# Patient Record
Sex: MALE | Race: WHITE | NOT HISPANIC OR LATINO | Employment: OTHER | URBAN - NONMETROPOLITAN AREA
[De-identification: names, ages, dates, MRNs, and addresses within clinical notes are randomized per-mention and may not be internally consistent; named-entity substitution may affect disease eponyms.]

---

## 2017-06-15 ENCOUNTER — OFFICE VISIT (OUTPATIENT)
Dept: SURGERY | Facility: CLINIC | Age: 57
End: 2017-06-15

## 2017-06-15 VITALS
WEIGHT: 134 LBS | HEIGHT: 66 IN | DIASTOLIC BLOOD PRESSURE: 90 MMHG | BODY MASS INDEX: 21.53 KG/M2 | TEMPERATURE: 99.8 F | HEART RATE: 87 BPM | OXYGEN SATURATION: 96 % | SYSTOLIC BLOOD PRESSURE: 140 MMHG

## 2017-06-15 DIAGNOSIS — K40.90 UNILATERAL INGUINAL HERNIA WITHOUT OBSTRUCTION OR GANGRENE, RECURRENCE NOT SPECIFIED: Primary | ICD-10-CM

## 2017-06-15 DIAGNOSIS — I25.10 CORONARY ARTERY DISEASE INVOLVING NATIVE HEART WITHOUT ANGINA PECTORIS, UNSPECIFIED VESSEL OR LESION TYPE: ICD-10-CM

## 2017-06-15 PROCEDURE — 99214 OFFICE O/P EST MOD 30 MIN: CPT | Performed by: SURGERY

## 2017-06-15 RX ORDER — QUETIAPINE FUMARATE 25 MG/1
250 TABLET, FILM COATED ORAL NIGHTLY
COMMUNITY
End: 2022-09-22 | Stop reason: DRUGHIGH

## 2017-06-15 RX ORDER — IBUPROFEN 800 MG/1
TABLET ORAL
Refills: 0 | COMMUNITY
Start: 2017-06-02 | End: 2018-02-05

## 2017-06-15 RX ORDER — HYDROXYZINE HYDROCHLORIDE 25 MG/1
TABLET, FILM COATED ORAL
COMMUNITY
Start: 2017-04-03 | End: 2018-02-05

## 2017-06-15 RX ORDER — ATORVASTATIN CALCIUM 40 MG/1
TABLET, FILM COATED ORAL
Refills: 0 | COMMUNITY
Start: 2017-05-22 | End: 2018-02-05 | Stop reason: DRUGHIGH

## 2017-06-15 NOTE — PROGRESS NOTES
Patient: Gregor Fish Jr.    YOB: 1960    Date: 06/15/2017    Primary Care Provider: TAMMI Samson    Reason for Consultation: Inguinal pain / mass    Chief complaint:   Chief Complaint   Patient presents with   • Pain     left groin bulge/pain       Subjective .     History of present illness:  I saw the patient in the office today for a consultation for a possible hernia. Patient states he noticed a bulge @ left groin a few months ago.  He complains of growth,diarrhea and pain, worse with lifting. He had a right inguinal hernia repair done in the past.  Patient had a colonoscopy a year ago by Dr. Shepard. He had a heart attack 10 years ago. Patient is not on blood thinners. He does still smoke.    Review of Systems   Constitutional: Negative for chills, fever and unexpected weight change.   HENT: Negative for trouble swallowing and voice change.    Eyes: Negative for visual disturbance.   Respiratory: Positive for cough and shortness of breath. Negative for apnea, chest tightness and wheezing.    Cardiovascular: Negative for chest pain, palpitations and leg swelling.   Gastrointestinal: Positive for abdominal pain (LLQ). Negative for abdominal distention, anal bleeding, blood in stool, constipation, diarrhea, nausea, rectal pain and vomiting.   Endocrine: Negative for cold intolerance and heat intolerance.   Genitourinary: Negative for difficulty urinating, dysuria, flank pain, scrotal swelling and testicular pain.   Musculoskeletal: Negative for back pain, gait problem and joint swelling.   Skin: Negative for color change, rash and wound.   Neurological: Negative for dizziness, syncope, speech difficulty, weakness, numbness and headaches.   Hematological: Negative for adenopathy. Does not bruise/bleed easily.   Psychiatric/Behavioral: Negative for confusion. The patient is not nervous/anxious.        History:  Past Medical History:   Diagnosis Date   • Chronic kidney disease    • COPD  (chronic obstructive pulmonary disease)    • Hypertension    • Myocardial infarction        Past Surgical History:   Procedure Laterality Date   • HERNIA REPAIR         Family History   Problem Relation Age of Onset   • No Known Problems Mother    • No Known Problems Father    • No Known Problems Sister    • No Known Problems Brother        Social History   Substance Use Topics   • Smoking status: Current Every Day Smoker   • Smokeless tobacco: None   • Alcohol use Yes       Allergies:  No Known Allergies    Medications:    Current Outpatient Prescriptions:   •  atorvastatin (LIPITOR) 40 MG tablet, take 1 tablet by mouth at bedtime, Disp: , Rfl: 0  •  hydrOXYzine (ATARAX) 25 MG tablet, , Disp: , Rfl:   •  ibuprofen (ADVIL,MOTRIN) 800 MG tablet, , Disp: , Rfl: 0  •  QUEtiapine (SEROquel) 25 MG tablet, Take 25 mg by mouth Every Night., Disp: , Rfl:     Objective     Vital Signs:   Temp:  [99.8 °F (37.7 °C)] 99.8 °F (37.7 °C)  Heart Rate:  [87] 87  BP: (140)/(90) 140/90    Physical Exam:   General Appearance:    Alert, cooperative, in no acute distress   Head:    Normocephalic, without obvious abnormality, atraumatic   Eyes:            Lids and lashes normal, conjunctivae and sclerae normal, no   icterus, no pallor, corneas clear, PERRLA   Ears:    Ears appear intact with no abnormalities noted   Throat:   No oral lesions, no thrush, oral mucosa moist   Neck:   No adenopathy, supple, trachea midline, no thyromegaly, no   carotid bruit, no JVD   Lungs:     Clear to auscultation,respirations regular, even and                  unlabored    Heart:    Regular rhythm and normal rate, normal S1 and S2, no            murmur   Abdomen:     no masses, no organomegaly, soft non-tender, non-distended, no guarding, there is evidence of a reducible left inguinal hernia, there is no evidence of recurrent right inguinal hernia   Extremities:   Moves all extremities well, no edema, no cyanosis, no             redness   Pulses:   Pulses  palpable and equal bilaterally   Skin:   No bleeding, bruising or rash   Lymph nodes:   No palpable adenopathy   Neurologic:   Cranial nerves 2 - 12 grossly intact, sensation intact        Results Review:   I reviewed the patient's new clinical results.  I reviewed the patient's new imaging results and agree with the interpretation.    Assessment/Plan :    1. Unilateral inguinal hernia without obstruction or gangrene, recurrence not specified        I had a detailed and extensive discussion with the patient in the office and they understand that they need to undergo inguinal hernia repair with mesh.  Full risks and benefits of operative versus nonoperative intervention were discussed with the patient and these included things such as nonresolution of symptoms and possible worsening of symptoms without surgical intervention versus infection, bleeding, possible recurrent hernia, possible postoperative neuralgia from nerve damage or involvement with scar tissue, etc.  The patient understands, agrees, and had no questions for me at the end of the office visit.     I discussed the patients findings and my recommendations with patient.    Because of his previous history of CAD and current smoking he will need to have cardiac clearance first.    Sam Mas MD  06/15/17

## 2017-07-13 ENCOUNTER — CONSULT (OUTPATIENT)
Dept: CARDIOLOGY | Facility: CLINIC | Age: 57
End: 2017-07-13

## 2017-07-13 VITALS
BODY MASS INDEX: 20.07 KG/M2 | SYSTOLIC BLOOD PRESSURE: 152 MMHG | WEIGHT: 132.4 LBS | HEART RATE: 94 BPM | DIASTOLIC BLOOD PRESSURE: 82 MMHG | HEIGHT: 68 IN

## 2017-07-13 DIAGNOSIS — E78.2 MIXED HYPERLIPIDEMIA: ICD-10-CM

## 2017-07-13 DIAGNOSIS — R07.2 PRECORDIAL PAIN: ICD-10-CM

## 2017-07-13 DIAGNOSIS — I10 ESSENTIAL HYPERTENSION: ICD-10-CM

## 2017-07-13 DIAGNOSIS — Z01.810 PREOP CARDIOVASCULAR EXAM: Primary | ICD-10-CM

## 2017-07-13 DIAGNOSIS — I25.10 CORONARY ARTERY DISEASE INVOLVING NATIVE CORONARY ARTERY OF NATIVE HEART WITHOUT ANGINA PECTORIS: ICD-10-CM

## 2017-07-13 PROCEDURE — 93000 ELECTROCARDIOGRAM COMPLETE: CPT | Performed by: INTERNAL MEDICINE

## 2017-07-13 PROCEDURE — 99204 OFFICE O/P NEW MOD 45 MIN: CPT | Performed by: INTERNAL MEDICINE

## 2017-07-13 NOTE — PROGRESS NOTES
"[facility] at [facility]  Consultation H&P  Gregor Fish Jr.  1960  [phone]    VISIT DATE:  07/13/2017    PCP: TAMMI Samson  [address]  [facility] [place] [address]    IDENTIFICATION: A 56 y.o. male from [facility], disabled    CC:  Chief Complaint   Patient presents with   • Surgical Clearance   • Chest Pain   • Shortness of Breath   • Palpitations       PROBLEM LIST:  1. CAD  1. MI 1996  2. 3/7/13 Brissa scan: No diagnostic ST changes, negative pharmacologic stress test, data deficient on nuclear report  2. HTN   3. HLD, on atorvastatin  1. 10/4/16 lipids: , TG 60, ,   4. COPD  5. Tobacco abuse  6. Alcohol abuse  7. CKD  8. OA  9. Anxiety/depression/PTSD from [place]  10. Surgical Hx  1. Hernia repair  2. Colonoscopy    Allergies  No Known Allergies    Current Medications    Current Outpatient Prescriptions:   •  atorvastatin (LIPITOR) 40 MG tablet, take 1 tablet by mouth at bedtime, Disp: , Rfl: 0  •  hydrOXYzine (ATARAX) 25 MG tablet, , Disp: , Rfl:   •  ibuprofen (ADVIL,MOTRIN) 800 MG tablet, , Disp: , Rfl: 0  •  QUEtiapine (SEROquel) 25 MG tablet, Take 25 mg by mouth Every Night., Disp: , Rfl:      History of Present Illness   HPI  This is a 56-year-old  male with a PMH significant for HTN, CAD (MI 1996, no stents), COPD, CKD who presents for consult from Dr. Mas for cardiac clearance for an inguinal hernia repair.    Pt had seen previous cardiologists, states the last time he saw a cardiologist was at \"[facility] in 2009. Has never been on antihypertensives. He states he takes his ibuprofen 800 \"like candy\" multiple times daily. Endorses \"terrible\" heart burn. Also endorses melena.     He endorses a month long history of \"terrible burning\" in his L chest. Worse with exertion. No changes with food or position changes. It improves with rest. Sometimes associated with dyspnea and sweating. Endorses PND, never been screened for JULIANNE. Also endorses " "claudication in his calves bilaterally.    Drinks \"a little vodka every now and then\".    Pt denies any orthopnea, palpitations, lower extremity edema. Pt denies history of CHF, DVT, PE, CVA, TIA, or rheumatic fever. Is smoking 21 PPD and not interested in quitting. Pt does not have a car so walks when he has to go to the store, etc. No formal exercise. Father  of MI 64. Mother with MIs and CABG. Brother  of MI in 40s.     ROS  Review of Systems   Constitution: Positive for malaise/fatigue.   Cardiovascular: Positive for chest pain, claudication, dyspnea on exertion and paroxysmal nocturnal dyspnea.   Respiratory: Positive for shortness of breath and sleep disturbances due to breathing.    Musculoskeletal: Positive for back pain.   Gastrointestinal: Positive for heartburn.   Psychiatric/Behavioral: The patient is nervous/anxious.    All other systems reviewed and are negative.      SOCIAL HX  Social History     Social History   • Marital status: Single     Spouse name: N/A   • Number of children: N/A   • Years of education: N/A     Occupational History   • Not on file.     Social History Main Topics   • Smoking status: Current Every Day Smoker     Packs/day: 1.00   • Smokeless tobacco: Never Used   • Alcohol use Yes      Comment: rare   • Drug use: No   • Sexual activity: Defer     Other Topics Concern   • Not on file     Social History Narrative       FAMILY HX  Family History   Problem Relation Age of Onset   • Diabetes Mother    • Hypertension Mother    • Heart attack Father    • No Known Problems Sister    • No Known Problems Brother        Vitals:    17 1041 17 1048   BP: 158/86 152/82   BP Location: Right arm Left arm   Patient Position: Sitting Sitting   Pulse: 94    Weight: 132 lb 6.4 oz (60.1 kg)    Height: 68\" (172.7 cm)        PHYSICAL EXAMINATION:  Physical Exam   Constitutional: He is oriented to person, place, and time. He appears well-developed and well-nourished. No distress. "   HENT:   Head: Normocephalic and atraumatic.   Right Ear: External ear normal.   Left Ear: External ear normal.   Nose: Nose normal.   Eyes: Conjunctivae and EOM are normal.   Neck: Neck supple. No hepatojugular reflux and no JVD present. Carotid bruit is not present. No thyromegaly present.   Cardiovascular: Normal rate, regular rhythm, S1 normal, S2 normal, normal heart sounds, intact distal pulses and normal pulses.  Exam reveals no gallop, no distant heart sounds and no midsystolic click.    No murmur heard.  Pulses:       Radial pulses are 2+ on the right side, and 2+ on the left side.        Dorsalis pedis pulses are 2+ on the right side, and 2+ on the left side.        Posterior tibial pulses are 2+ on the right side, and 2+ on the left side.   Pulmonary/Chest: Effort normal. No respiratory distress. He has no decreased breath sounds. He has no wheezes. He has rhonchi. He has no rales.   Abdominal: Soft. Bowel sounds are normal. There is no hepatosplenomegaly. There is no tenderness.   Musculoskeletal: Normal range of motion. He exhibits no edema.   Neurological: He is alert and oriented to person, place, and time.   No focal deficits.   Skin: Skin is warm and dry. No erythema.   Psychiatric: He has a normal mood and affect. Thought content normal.   Nursing note and vitals reviewed.      Diagnostic Data:    ECG 12 Lead  Date/Time: 7/13/2017 11:24 AM  Performed by: JONO HUA  Authorized by: JONO HUA   Rhythm: sinus rhythm  BPM: 94  Clinical impression: non-specific ECG  Comments: Nonspecific T wave changes          ASSESSMENT:   Diagnosis Plan   1. Preop cardiovascular exam  Stress Test With Myocardial Perfusion (1 Day)   2. Coronary artery disease involving native coronary artery of native heart without angina pectoris  Stress Test With Myocardial Perfusion (1 Day)   3. Essential hypertension     4. Mixed hyperlipidemia     5. Precordial pain         PLAN:  1. Cardiac clearance pending  noninvasive ischemic evaluation.   2. Anginal equivalent CP in the setting of many risk factors, will risk stratify with noninvasive ischemic eval  3. Elevated today. Pt encouraged to get BP monitor and check at home and keep log to bring to next PCP visit.  4. Continue statin therapy with Lipitor  5. Patient counseled extensively on the many benefits of smoking cessation and encouraged to be motivated to quit  6. Patient encouraged to cut back on alcohol use as well  7. Patient encouraged to discontinue heavy use of ibuprofen.  Patient counseled on the harm to the stomach, kidneys, and increase in cardiac risk from heavy NSAID use.  Due to patient's symptoms a GI evaluation would be appropriate.  Patient likely needs upper endoscopy.    RTC 1 year    Scribed for Bert Negrete MD by Trina Alvarado PA-C. 7/13/2017  11:24 AM  IBert MD, personally performed the services described in this documentation as scribed by the above named individual in my presence, and it is both accurate and complete.  7/13/2017  1:04 PM    Bert Negrete MD, FACC

## 2017-07-24 ENCOUNTER — APPOINTMENT (OUTPATIENT)
Dept: CARDIOLOGY | Facility: HOSPITAL | Age: 57
End: 2017-07-24

## 2017-07-31 ENCOUNTER — HOSPITAL ENCOUNTER (OUTPATIENT)
Dept: CARDIOLOGY | Facility: HOSPITAL | Age: 57
Discharge: HOME | End: 2017-07-31
Admitting: PHYSICIAN ASSISTANT

## 2017-07-31 ENCOUNTER — HOSPITAL ENCOUNTER (OUTPATIENT)
Dept: CARDIOLOGY | Facility: HOSPITAL | Age: 57
Discharge: HOME | End: 2017-07-31

## 2017-07-31 DIAGNOSIS — Z01.810 PREOP CARDIOVASCULAR EXAM: ICD-10-CM

## 2017-07-31 DIAGNOSIS — I25.10 CORONARY ARTERY DISEASE INVOLVING NATIVE CORONARY ARTERY OF NATIVE HEART WITHOUT ANGINA PECTORIS: ICD-10-CM

## 2017-07-31 PROCEDURE — 78452 HT MUSCLE IMAGE SPECT MULT: CPT | Performed by: INTERNAL MEDICINE

## 2017-07-31 PROCEDURE — 93017 CV STRESS TEST TRACING ONLY: CPT

## 2017-07-31 PROCEDURE — 93018 CV STRESS TEST I&R ONLY: CPT | Performed by: INTERNAL MEDICINE

## 2017-07-31 PROCEDURE — A9500 TC99M SESTAMIBI: HCPCS | Performed by: INTERNAL MEDICINE

## 2017-07-31 PROCEDURE — 0 TECHNETIUM SESTAMIBI: Performed by: INTERNAL MEDICINE

## 2017-07-31 PROCEDURE — 25010000002 REGADENOSON 0.4 MG/5ML SOLUTION: Performed by: INTERNAL MEDICINE

## 2017-07-31 PROCEDURE — 78452 HT MUSCLE IMAGE SPECT MULT: CPT

## 2017-07-31 RX ADMIN — REGADENOSON 0.4 MG: 0.08 INJECTION, SOLUTION INTRAVENOUS at 10:10

## 2017-07-31 RX ADMIN — TECHNETIUM TC-99M SESTAMIBI 1 DOSE: 1 INJECTION INTRAVENOUS at 08:05

## 2017-07-31 RX ADMIN — TECHNETIUM TC-99M SESTAMIBI 1 DOSE: 1 INJECTION INTRAVENOUS at 10:10

## 2017-08-01 LAB
BH CV NUCLEAR PRIOR STUDY: 1
BH CV STRESS BP STAGE 1: NORMAL
BH CV STRESS BP STAGE 2: NORMAL
BH CV STRESS BP STAGE 3: NORMAL
BH CV STRESS COMMENTS STAGE 1: NORMAL
BH CV STRESS COMMENTS STAGE 2: NORMAL
BH CV STRESS DOSE REGADENOSON STAGE 1: 0.4
BH CV STRESS DURATION MIN STAGE 1: 1
BH CV STRESS DURATION MIN STAGE 2: 3
BH CV STRESS DURATION MIN STAGE 3: 2
BH CV STRESS DURATION SEC STAGE 1: 0
BH CV STRESS DURATION SEC STAGE 2: 0
BH CV STRESS DURATION SEC STAGE 3: 0
BH CV STRESS HR STAGE 1: 75
BH CV STRESS HR STAGE 2: 101
BH CV STRESS HR STAGE 3: 98
BH CV STRESS PROTOCOL 1: NORMAL
BH CV STRESS RECOVERY BP: NORMAL MMHG
BH CV STRESS RECOVERY HR: 99 BPM
BH CV STRESS STAGE 1: 1
BH CV STRESS STAGE 2: 2
BH CV STRESS STAGE 3: 3
LV EF NUC BP: 63 %
MAXIMAL PREDICTED HEART RATE: 164 BPM
PERCENT MAX PREDICTED HR: 67.68 %
STRESS BASELINE BP: NORMAL MMHG
STRESS BASELINE HR: 78 BPM
STRESS PERCENT HR: 80 %
STRESS POST PEAK BP: NORMAL MMHG
STRESS POST PEAK HR: 111 BPM
STRESS TARGET HR: 139 BPM

## 2017-08-08 ENCOUNTER — TELEPHONE (OUTPATIENT)
Dept: CARDIOLOGY | Facility: CLINIC | Age: 57
End: 2017-08-08

## 2017-08-08 NOTE — TELEPHONE ENCOUNTER
Patient called about his stress test results. Informed him results were within normal limits and pumping function of heart is normal. Patient verbalized understanding.

## 2017-08-29 ENCOUNTER — TELEPHONE (OUTPATIENT)
Dept: SURGERY | Facility: CLINIC | Age: 57
End: 2017-08-29

## 2017-08-30 ENCOUNTER — TELEPHONE (OUTPATIENT)
Dept: SURGERY | Facility: CLINIC | Age: 57
End: 2017-08-30

## 2017-08-30 NOTE — TELEPHONE ENCOUNTER
I talked with pt, I will obtain cardiac clearance from Dr. Negrete's office and make a follow-up appt to be seen in the office preoperatively.

## 2017-09-21 ENCOUNTER — OFFICE VISIT (OUTPATIENT)
Dept: SURGERY | Facility: CLINIC | Age: 57
End: 2017-09-21

## 2017-09-21 VITALS
OXYGEN SATURATION: 98 % | TEMPERATURE: 99.2 F | HEIGHT: 68 IN | BODY MASS INDEX: 20 KG/M2 | SYSTOLIC BLOOD PRESSURE: 108 MMHG | RESPIRATION RATE: 16 BRPM | WEIGHT: 132 LBS | DIASTOLIC BLOOD PRESSURE: 66 MMHG | HEART RATE: 72 BPM

## 2017-09-21 DIAGNOSIS — K40.90 UNILATERAL INGUINAL HERNIA WITHOUT OBSTRUCTION OR GANGRENE, RECURRENCE NOT SPECIFIED: Primary | ICD-10-CM

## 2017-09-21 PROCEDURE — 99213 OFFICE O/P EST LOW 20 MIN: CPT | Performed by: SURGERY

## 2017-09-21 RX ORDER — SUCRALFATE 1 G/1
TABLET ORAL
Refills: 0 | COMMUNITY
Start: 2017-07-26 | End: 2018-02-05

## 2017-09-21 RX ORDER — PANTOPRAZOLE SODIUM 20 MG/1
TABLET, DELAYED RELEASE ORAL
Refills: 0 | COMMUNITY
Start: 2017-07-26 | End: 2018-02-05

## 2017-09-21 NOTE — PROGRESS NOTES
Patient: Gregor Fish Jr.    YOB: 1960    Date: 09/21/2017    Primary Care Provider: TAMMI Samson    Reason for Consultation: Left inguinal hernia.    Chief complaint:   Chief Complaint   Patient presents with   • Hernia     left inguinal.       Subjective .     History of present illness:  I saw the patient in the office today as a consultation for evaluation and treatment of a left inguinal hernia, pt was seen in the office previously for evaluation of painful bulge in the left inguinal area, exam revealed a hernia, he needed pre operative cardiac clearance prior to hernia repair and it was granted by Dr. Negrete, pt continues to complain of a painful bulge in his left groin area, he denies change in bowel habits. This pain is dull in nature, worse with heavy lifting, relieved with rest, not associated with any nausea but with palpable mass, non-radiating in nature.    Review of Systems   Constitutional: Negative for chills, fever and unexpected weight change.   HENT: Negative for trouble swallowing and voice change.    Eyes: Negative for visual disturbance.   Respiratory: Negative for apnea, cough, chest tightness, shortness of breath and wheezing.    Cardiovascular: Negative for chest pain, palpitations and leg swelling.   Gastrointestinal: Negative for abdominal distention, abdominal pain, anal bleeding, blood in stool, constipation, diarrhea, nausea, rectal pain and vomiting.   Endocrine: Negative for cold intolerance and heat intolerance.   Genitourinary: Negative for difficulty urinating, dysuria, flank pain, scrotal swelling and testicular pain.   Musculoskeletal: Negative for back pain, gait problem and joint swelling.   Skin: Negative for color change, rash and wound.   Neurological: Negative for dizziness, syncope, speech difficulty, weakness, numbness and headaches.   Hematological: Negative for adenopathy. Does not bruise/bleed easily.   Psychiatric/Behavioral: Negative for  confusion. The patient is not nervous/anxious.        History:  Past Medical History:   Diagnosis Date   • Chronic kidney disease    • COPD (chronic obstructive pulmonary disease)    • Hypertension    • Myocardial infarction        Past Surgical History:   Procedure Laterality Date   • HERNIA REPAIR         Family History   Problem Relation Age of Onset   • Diabetes Mother    • Hypertension Mother    • Heart attack Father    • No Known Problems Sister    • No Known Problems Brother        Social History   Substance Use Topics   • Smoking status: Current Every Day Smoker     Packs/day: 1.00   • Smokeless tobacco: Never Used   • Alcohol use Yes      Comment: rare       Allergies:  No Known Allergies    Medications:    Current Outpatient Prescriptions:   •  atorvastatin (LIPITOR) 40 MG tablet, take 1 tablet by mouth at bedtime, Disp: , Rfl: 0  •  hydrOXYzine (ATARAX) 25 MG tablet, , Disp: , Rfl:   •  ibuprofen (ADVIL,MOTRIN) 800 MG tablet, , Disp: , Rfl: 0  •  pantoprazole (PROTONIX) 20 MG EC tablet, take 1 tablet by mouth once daily, Disp: , Rfl: 0  •  QUEtiapine (SEROquel) 25 MG tablet, Take 25 mg by mouth Every Night., Disp: , Rfl:   •  sucralfate (CARAFATE) 1 g tablet, TAKE 1 TABLET BY MOUTH 3 TIMES A DAY, Disp: , Rfl: 0    Objective     Vital Signs:   Temp:  [99.2 °F (37.3 °C)] 99.2 °F (37.3 °C)  Heart Rate:  [72] 72  Resp:  [16] 16  BP: (108)/(66) 108/66    Physical Exam:   General Appearance:    Alert, cooperative, in no acute distress   Head:    Normocephalic, without obvious abnormality, atraumatic   Eyes:            Lids and lashes normal, conjunctivae and sclerae normal, no   icterus, no pallor, corneas clear, PERRLA   Ears:    Ears appear intact with no abnormalities noted   Throat:   No oral lesions, no thrush, oral mucosa moist   Neck:   No adenopathy, supple, trachea midline, no thyromegaly, no   carotid bruit, no JVD   Lungs:     Clear to auscultation,respirations regular, even and                   unlabored    Heart:    Regular rhythm and normal rate, normal S1 and S2, no            murmur   Abdomen:     no masses, no organomegaly, soft non-tender, non-distended, no guarding, there is evidence of a reducible left inguinal hernia   Extremities:   Moves all extremities well, no edema, no cyanosis, no             redness   Pulses:   Pulses palpable and equal bilaterally   Skin:   No bleeding, bruising or rash   Lymph nodes:   No palpable adenopathy   Neurologic:   Cranial nerves 2 - 12 grossly intact, sensation intact        Results Review:   I reviewed the patient's new clinical results.  I reviewed the patient's new imaging results and agree with the interpretation.  I reviewed the patient's other test results and agree with the interpretation    Review of Systems was reviewed and confirmed as accurate today.    Assessment/Plan :    1. Unilateral inguinal hernia without obstruction or gangrene, recurrence not specified        I had a detailed and extensive discussion with the patient in the office and they understand that they need to undergo hernia repair with mesh.  Full risks and benefits of operative versus nonoperative intervention were discussed with the patient and these included things such as nonresolution of symptoms and possible worsening of symptoms without surgical intervention versus infection, bleeding, possible recurrent hernia, possible postoperative neuralgia from nerve damage or involvement with scar tissue, etc.  The patient understands, agrees, and had no questions for me at the end of the office visit.     I discussed the patients findings and my recommendations with patient.    The patient did have preoperative cardiac clearance per Dr. Negrete which is in the EMR under the Media section.    Electronically signed by Sam Mas MD  09/21/17      Scribed for Sam Mas MD by Evelia Mills. 9/21/2017  3:14 PM

## 2017-09-29 ENCOUNTER — OUTSIDE FACILITY SERVICE (OUTPATIENT)
Dept: SURGERY | Facility: CLINIC | Age: 57
End: 2017-09-29

## 2017-09-29 PROCEDURE — 49505 PRP I/HERN INIT REDUC >5 YR: CPT | Performed by: SURGERY

## 2017-10-26 ENCOUNTER — TELEPHONE (OUTPATIENT)
Dept: SURGERY | Facility: CLINIC | Age: 57
End: 2017-10-26

## 2017-10-26 NOTE — TELEPHONE ENCOUNTER
"Pt is s/p left inguinal hernia repair which was done on 09/29/2017, he did not keep post op appointments on 10/12/2017 and 10/26/2017, I called pt, he stated that he \"could not get there,\" he also stated that he was having pain \"from his surgery,\" I asked him to reschedule his post op appt, he stated \"I cannot get there,\" I encouraged him to try to get to the office or the hospital for evaluation.\"  "

## 2017-12-14 ENCOUNTER — TRANSCRIBE ORDERS (OUTPATIENT)
Dept: ADMINISTRATIVE | Facility: HOSPITAL | Age: 57
End: 2017-12-14

## 2017-12-14 DIAGNOSIS — R06.83 SNORING: Primary | ICD-10-CM

## 2018-01-10 ENCOUNTER — HOSPITAL ENCOUNTER (OUTPATIENT)
Dept: SLEEP MEDICINE | Facility: HOSPITAL | Age: 58
Discharge: HOME | End: 2018-01-10
Admitting: NURSE PRACTITIONER

## 2018-01-10 DIAGNOSIS — R06.83 SNORING: ICD-10-CM

## 2018-01-10 PROCEDURE — 95810 POLYSOM 6/> YRS 4/> PARAM: CPT | Performed by: PSYCHIATRY & NEUROLOGY

## 2018-01-10 PROCEDURE — 95810 POLYSOM 6/> YRS 4/> PARAM: CPT

## 2018-02-04 ENCOUNTER — HOSPITAL ENCOUNTER (EMERGENCY)
Facility: HOSPITAL | Age: 58
Discharge: PSYCHIATRIC OR SUBSTANCE USE FACILITY | End: 2018-02-05
Attending: EMERGENCY MEDICINE | Admitting: EMERGENCY MEDICINE

## 2018-02-04 VITALS
SYSTOLIC BLOOD PRESSURE: 127 MMHG | DIASTOLIC BLOOD PRESSURE: 99 MMHG | WEIGHT: 120 LBS | HEART RATE: 122 BPM | BODY MASS INDEX: 18.83 KG/M2 | RESPIRATION RATE: 20 BRPM | OXYGEN SATURATION: 96 % | TEMPERATURE: 98.2 F | HEIGHT: 67 IN

## 2018-02-04 DIAGNOSIS — F10.920 ALCOHOLIC INTOXICATION WITHOUT COMPLICATION (HCC): Primary | ICD-10-CM

## 2018-02-04 LAB
6-ACETYL MORPHINE: NEGATIVE
ALBUMIN SERPL-MCNC: 4.7 G/DL (ref 3.5–5)
ALBUMIN/GLOB SERPL: 1.9 G/DL (ref 1.5–2.5)
ALP SERPL-CCNC: 82 U/L (ref 40–129)
ALT SERPL W P-5'-P-CCNC: 90 U/L (ref 10–44)
AMPHET+METHAMPHET UR QL: NEGATIVE
ANION GAP SERPL CALCULATED.3IONS-SCNC: 9.1 MMOL/L (ref 3.6–11.2)
AST SERPL-CCNC: 130 U/L (ref 10–34)
BACTERIA UR QL AUTO: ABNORMAL /HPF
BARBITURATES UR QL SCN: NEGATIVE
BASOPHILS # BLD AUTO: 0.02 10*3/MM3 (ref 0–0.3)
BASOPHILS NFR BLD AUTO: 0.2 % (ref 0–2)
BENZODIAZ UR QL SCN: NEGATIVE
BILIRUB SERPL-MCNC: 0.3 MG/DL (ref 0.2–1.8)
BILIRUB UR QL STRIP: NEGATIVE
BUN BLD-MCNC: 17 MG/DL (ref 7–21)
BUN/CREAT SERPL: 21 (ref 7–25)
BUPRENORPHINE SERPL-MCNC: NEGATIVE NG/ML
CALCIUM SPEC-SCNC: 9.2 MG/DL (ref 7.7–10)
CANNABINOIDS SERPL QL: NEGATIVE
CHLORIDE SERPL-SCNC: 106 MMOL/L (ref 99–112)
CLARITY UR: ABNORMAL
CO2 SERPL-SCNC: 24.9 MMOL/L (ref 24.3–31.9)
COCAINE UR QL: NEGATIVE
COD CRY URNS QL: ABNORMAL /HPF
COLOR UR: ABNORMAL
CREAT BLD-MCNC: 0.81 MG/DL (ref 0.43–1.29)
DEPRECATED RDW RBC AUTO: 52.7 FL (ref 37–54)
EOSINOPHIL # BLD AUTO: 0.1 10*3/MM3 (ref 0–0.7)
EOSINOPHIL NFR BLD AUTO: 1.2 % (ref 0–5)
ERYTHROCYTE [DISTWIDTH] IN BLOOD BY AUTOMATED COUNT: 16.6 % (ref 11.5–14.5)
ETHANOL BLD-MCNC: 230 MG/DL
ETHANOL BLD-MCNC: 358 MG/DL
ETHANOL UR QL: 0.23 %
ETHANOL UR QL: 0.36 %
GFR SERPL CREATININE-BSD FRML MDRD: 98 ML/MIN/1.73
GLOBULIN UR ELPH-MCNC: 2.5 GM/DL
GLUCOSE BLD-MCNC: 173 MG/DL (ref 70–110)
GLUCOSE UR STRIP-MCNC: ABNORMAL MG/DL
HCT VFR BLD AUTO: 36.4 % (ref 42–52)
HGB BLD-MCNC: 12.2 G/DL (ref 14–18)
HGB UR QL STRIP.AUTO: NEGATIVE
HYALINE CASTS UR QL AUTO: ABNORMAL /LPF
IMM GRANULOCYTES # BLD: 0.01 10*3/MM3 (ref 0–0.03)
IMM GRANULOCYTES NFR BLD: 0.1 % (ref 0–0.5)
KETONES UR QL STRIP: ABNORMAL
LEUKOCYTE ESTERASE UR QL STRIP.AUTO: NEGATIVE
LYMPHOCYTES # BLD AUTO: 2.69 10*3/MM3 (ref 1–3)
LYMPHOCYTES NFR BLD AUTO: 31.8 % (ref 21–51)
MAGNESIUM SERPL-MCNC: 1.6 MG/DL (ref 1.7–2.6)
MCH RBC QN AUTO: 29.1 PG (ref 27–33)
MCHC RBC AUTO-ENTMCNC: 33.5 G/DL (ref 33–37)
MCV RBC AUTO: 86.9 FL (ref 80–94)
METHADONE UR QL SCN: NEGATIVE
MONOCYTES # BLD AUTO: 0.91 10*3/MM3 (ref 0.1–0.9)
MONOCYTES NFR BLD AUTO: 10.8 % (ref 0–10)
NEUTROPHILS # BLD AUTO: 4.72 10*3/MM3 (ref 1.4–6.5)
NEUTROPHILS NFR BLD AUTO: 55.9 % (ref 30–70)
NITRITE UR QL STRIP: NEGATIVE
OPIATES UR QL: NEGATIVE
OSMOLALITY SERPL CALC.SUM OF ELEC: 285.1 MOSM/KG (ref 273–305)
OXYCODONE UR QL SCN: NEGATIVE
PCP UR QL SCN: NEGATIVE
PH UR STRIP.AUTO: 6 [PH] (ref 5–8)
PLATELET # BLD AUTO: 187 10*3/MM3 (ref 130–400)
PMV BLD AUTO: 9.9 FL (ref 6–10)
POTASSIUM BLD-SCNC: 3.2 MMOL/L (ref 3.5–5.3)
PROT SERPL-MCNC: 7.2 G/DL (ref 6–8)
PROT UR QL STRIP: ABNORMAL
RBC # BLD AUTO: 4.19 10*6/MM3 (ref 4.7–6.1)
RBC # UR: ABNORMAL /HPF
REF LAB TEST METHOD: ABNORMAL
SODIUM BLD-SCNC: 140 MMOL/L (ref 135–153)
SP GR UR STRIP: 1.03 (ref 1–1.03)
SQUAMOUS #/AREA URNS HPF: ABNORMAL /HPF
UROBILINOGEN UR QL STRIP: ABNORMAL
WBC NRBC COR # BLD: 8.45 10*3/MM3 (ref 4.5–12.5)
WBC UR QL AUTO: ABNORMAL /HPF

## 2018-02-04 PROCEDURE — 80307 DRUG TEST PRSMV CHEM ANLYZR: CPT | Performed by: EMERGENCY MEDICINE

## 2018-02-04 PROCEDURE — 85025 COMPLETE CBC W/AUTO DIFF WBC: CPT | Performed by: EMERGENCY MEDICINE

## 2018-02-04 PROCEDURE — 36415 COLL VENOUS BLD VENIPUNCTURE: CPT

## 2018-02-04 PROCEDURE — 81001 URINALYSIS AUTO W/SCOPE: CPT | Performed by: EMERGENCY MEDICINE

## 2018-02-04 PROCEDURE — 96375 TX/PRO/DX INJ NEW DRUG ADDON: CPT

## 2018-02-04 PROCEDURE — 83735 ASSAY OF MAGNESIUM: CPT | Performed by: EMERGENCY MEDICINE

## 2018-02-04 PROCEDURE — 25010000002 MAGNESIUM SULFATE PER 500 MG OF MAGNESIUM: Performed by: EMERGENCY MEDICINE

## 2018-02-04 PROCEDURE — 80307 DRUG TEST PRSMV CHEM ANLYZR: CPT | Performed by: PHYSICIAN ASSISTANT

## 2018-02-04 PROCEDURE — 25010000002 ONDANSETRON PER 1 MG: Performed by: PHYSICIAN ASSISTANT

## 2018-02-04 PROCEDURE — 80053 COMPREHEN METABOLIC PANEL: CPT | Performed by: EMERGENCY MEDICINE

## 2018-02-04 PROCEDURE — 99284 EMERGENCY DEPT VISIT MOD MDM: CPT

## 2018-02-04 PROCEDURE — 25010000002 LORAZEPAM PER 2 MG: Performed by: EMERGENCY MEDICINE

## 2018-02-04 PROCEDURE — 25010000002 THIAMINE PER 100 MG: Performed by: EMERGENCY MEDICINE

## 2018-02-04 PROCEDURE — 96365 THER/PROPH/DIAG IV INF INIT: CPT

## 2018-02-04 RX ORDER — LORAZEPAM 2 MG/ML
1 INJECTION INTRAMUSCULAR ONCE
Status: COMPLETED | OUTPATIENT
Start: 2018-02-04 | End: 2018-02-04

## 2018-02-04 RX ORDER — ONDANSETRON 2 MG/ML
4 INJECTION INTRAMUSCULAR; INTRAVENOUS ONCE
Status: COMPLETED | OUTPATIENT
Start: 2018-02-04 | End: 2018-02-04

## 2018-02-04 RX ORDER — SODIUM CHLORIDE 0.9 % (FLUSH) 0.9 %
10 SYRINGE (ML) INJECTION AS NEEDED
Status: DISCONTINUED | OUTPATIENT
Start: 2018-02-04 | End: 2018-02-05 | Stop reason: HOSPADM

## 2018-02-04 RX ORDER — POTASSIUM CHLORIDE 20 MEQ/1
40 TABLET, EXTENDED RELEASE ORAL ONCE
Status: COMPLETED | OUTPATIENT
Start: 2018-02-04 | End: 2018-02-04

## 2018-02-04 RX ORDER — OMEPRAZOLE 20 MG/1
20 CAPSULE, DELAYED RELEASE ORAL DAILY
COMMUNITY
End: 2023-02-02

## 2018-02-04 RX ORDER — FAMOTIDINE 10 MG/ML
20 INJECTION, SOLUTION INTRAVENOUS ONCE
Status: COMPLETED | OUTPATIENT
Start: 2018-02-04 | End: 2018-02-04

## 2018-02-04 RX ORDER — BUSPIRONE HYDROCHLORIDE 10 MG/1
10 TABLET ORAL 2 TIMES DAILY
Status: ON HOLD | COMMUNITY
End: 2022-07-17 | Stop reason: SDDI

## 2018-02-04 RX ORDER — POTASSIUM CHLORIDE 20 MEQ/1
TABLET, EXTENDED RELEASE ORAL
Status: COMPLETED
Start: 2018-02-04 | End: 2018-02-04

## 2018-02-04 RX ORDER — ESCITALOPRAM OXALATE 20 MG/1
10 TABLET ORAL DAILY
COMMUNITY
End: 2018-02-09 | Stop reason: HOSPADM

## 2018-02-04 RX ADMIN — POTASSIUM CHLORIDE 40 MEQ: 1500 TABLET, EXTENDED RELEASE ORAL at 23:11

## 2018-02-04 RX ADMIN — LORAZEPAM 1 MG: 2 INJECTION, SOLUTION INTRAMUSCULAR; INTRAVENOUS at 19:36

## 2018-02-04 RX ADMIN — THIAMINE HYDROCHLORIDE 1000 ML/HR: 100 INJECTION, SOLUTION INTRAMUSCULAR; INTRAVENOUS at 16:37

## 2018-02-04 RX ADMIN — POTASSIUM CHLORIDE 40 MEQ: 20 TABLET, EXTENDED RELEASE ORAL at 23:11

## 2018-02-04 RX ADMIN — ONDANSETRON 4 MG: 2 INJECTION INTRAMUSCULAR; INTRAVENOUS at 19:34

## 2018-02-04 RX ADMIN — FAMOTIDINE 20 MG: 10 INJECTION INTRAVENOUS at 19:32

## 2018-02-04 NOTE — ED PROVIDER NOTES
Subjective   HPI Comments: Drank this am   Wants to detox     Patient is a 57 y.o. male presenting with drug/alcohol assessment.   History provided by:  Patient   used: No    Drug / Alcohol Assessment   Primary symptoms include intoxication. This is a chronic problem. The current episode started 1 to 2 hours ago. The problem has not changed since onset.Suspected agents include alcohol. Pertinent negatives include no fever, no nausea and no vomiting. Associated medical issues include withdrawal syndrome.       Review of Systems   Constitutional: Negative for chills and fever.   HENT: Negative for congestion, ear pain and sore throat.    Respiratory: Negative for cough, shortness of breath and wheezing.    Cardiovascular: Negative for chest pain.   Gastrointestinal: Negative for diarrhea, nausea and vomiting.   Genitourinary: Negative for dysuria and flank pain.   Skin: Negative for rash.   Neurological: Negative for headaches.   Psychiatric/Behavioral: Positive for dysphoric mood. Negative for suicidal ideas. The patient is not nervous/anxious.    All other systems reviewed and are negative.      Past Medical History:   Diagnosis Date   • Alcohol abuse    • Anxiety    • Chronic kidney disease    • COPD (chronic obstructive pulmonary disease)    • Depression    • Hypertension    • Myocardial infarction    • Withdrawal symptoms, alcohol        No Known Allergies    Past Surgical History:   Procedure Laterality Date   • HERNIA REPAIR         Family History   Problem Relation Age of Onset   • Diabetes Mother    • Hypertension Mother    • Heart attack Father    • No Known Problems Sister    • No Known Problems Brother        Social History     Social History   • Marital status: Single     Spouse name: N/A   • Number of children: N/A   • Years of education: N/A     Social History Main Topics   • Smoking status: Current Every Day Smoker     Packs/day: 1.00     Types: Cigarettes   • Smokeless tobacco:  Never Used   • Alcohol use Yes      Comment: brianne gomezdka 3-4x a week since December   • Drug use: No   • Sexual activity: Defer     Other Topics Concern   • None     Social History Narrative           Objective   Physical Exam   Constitutional: He is oriented to person, place, and time. He appears well-developed and well-nourished.   HENT:   Head: Normocephalic.   Mouth/Throat: Oropharynx is clear and moist.   Neck: Neck supple.   Cardiovascular: Normal rate and regular rhythm.    Pulmonary/Chest: Effort normal and breath sounds normal.   Abdominal: Soft. Bowel sounds are normal. There is no tenderness.   Musculoskeletal: Normal range of motion.   Neurological: He is alert and oriented to person, place, and time.   Skin: Skin is warm and dry.   Psychiatric: He has a normal mood and affect. His behavior is normal. Judgment and thought content normal.   Nursing note and vitals reviewed.      Procedures         ED Course  ED Course                  MDM    Final diagnoses:   Alcoholic intoxication without complication            ARISTIDES Franco  02/06/18 [id number]

## 2018-02-05 ENCOUNTER — HOSPITAL ENCOUNTER (INPATIENT)
Facility: HOSPITAL | Age: 58
LOS: 4 days | Discharge: HOME | End: 2018-02-09
Attending: PSYCHIATRY & NEUROLOGY | Admitting: PSYCHIATRY & NEUROLOGY

## 2018-02-05 PROBLEM — F10.10 ALCOHOL ABUSE: Status: ACTIVE | Noted: 2018-02-05

## 2018-02-05 PROBLEM — F10.239 ALCOHOL DEPENDENCE WITH WITHDRAWAL: Status: ACTIVE | Noted: 2018-02-05

## 2018-02-05 LAB
ETHANOL BLD-MCNC: 103 MG/DL
ETHANOL UR QL: 0.1 %
POTASSIUM BLD-SCNC: 3.5 MMOL/L (ref 3.5–5.3)

## 2018-02-05 PROCEDURE — 80307 DRUG TEST PRSMV CHEM ANLYZR: CPT | Performed by: NURSE PRACTITIONER

## 2018-02-05 PROCEDURE — 99223 1ST HOSP IP/OBS HIGH 75: CPT | Performed by: PSYCHIATRY & NEUROLOGY

## 2018-02-05 PROCEDURE — 93005 ELECTROCARDIOGRAM TRACING: CPT | Performed by: PSYCHIATRY & NEUROLOGY

## 2018-02-05 PROCEDURE — 93010 ELECTROCARDIOGRAM REPORT: CPT | Performed by: INTERNAL MEDICINE

## 2018-02-05 PROCEDURE — 84132 ASSAY OF SERUM POTASSIUM: CPT | Performed by: PSYCHIATRY & NEUROLOGY

## 2018-02-05 PROCEDURE — HZ2ZZZZ DETOXIFICATION SERVICES FOR SUBSTANCE ABUSE TREATMENT: ICD-10-PCS | Performed by: PSYCHIATRY & NEUROLOGY

## 2018-02-05 RX ORDER — BUSPIRONE HYDROCHLORIDE 10 MG/1
10 TABLET ORAL 2 TIMES DAILY
Status: DISCONTINUED | OUTPATIENT
Start: 2018-02-05 | End: 2018-02-09 | Stop reason: HOSPADM

## 2018-02-05 RX ORDER — LOPERAMIDE HYDROCHLORIDE 2 MG/1
2 CAPSULE ORAL 4 TIMES DAILY PRN
Status: DISCONTINUED | OUTPATIENT
Start: 2018-02-05 | End: 2018-02-09 | Stop reason: HOSPADM

## 2018-02-05 RX ORDER — LORAZEPAM 2 MG/1
2 TABLET ORAL EVERY 4 HOURS PRN
Status: ACTIVE | OUTPATIENT
Start: 2018-02-06 | End: 2018-02-07

## 2018-02-05 RX ORDER — ATORVASTATIN CALCIUM 40 MG/1
80 TABLET, FILM COATED ORAL NIGHTLY
Status: CANCELLED | OUTPATIENT
Start: 2018-02-05

## 2018-02-05 RX ORDER — ATORVASTATIN CALCIUM 80 MG/1
80 TABLET, FILM COATED ORAL NIGHTLY
COMMUNITY

## 2018-02-05 RX ORDER — LORAZEPAM 2 MG/1
2 TABLET ORAL ONCE
Status: COMPLETED | OUTPATIENT
Start: 2018-02-05 | End: 2018-02-05

## 2018-02-05 RX ORDER — LORAZEPAM 2 MG/1
2 TABLET ORAL
Status: DISPENSED | OUTPATIENT
Start: 2018-02-05 | End: 2018-02-06

## 2018-02-05 RX ORDER — QUETIAPINE FUMARATE 25 MG/1
25 TABLET, FILM COATED ORAL NIGHTLY
Status: DISCONTINUED | OUTPATIENT
Start: 2018-02-05 | End: 2018-02-09 | Stop reason: HOSPADM

## 2018-02-05 RX ORDER — LORAZEPAM 1 MG/1
1 TABLET ORAL EVERY 4 HOURS PRN
Status: ACTIVE | OUTPATIENT
Start: 2018-02-08 | End: 2018-02-09

## 2018-02-05 RX ORDER — LORAZEPAM 1 MG/1
1 TABLET ORAL
Status: COMPLETED | OUTPATIENT
Start: 2018-02-08 | End: 2018-02-08

## 2018-02-05 RX ORDER — IBUPROFEN 600 MG/1
600 TABLET ORAL EVERY 6 HOURS PRN
Status: DISCONTINUED | OUTPATIENT
Start: 2018-02-05 | End: 2018-02-09 | Stop reason: HOSPADM

## 2018-02-05 RX ORDER — LORAZEPAM 2 MG/1
2 TABLET ORAL
Status: COMPLETED | OUTPATIENT
Start: 2018-02-06 | End: 2018-02-06

## 2018-02-05 RX ORDER — THIAMINE HCL 50 MG
100 TABLET ORAL DAILY
Status: DISCONTINUED | OUTPATIENT
Start: 2018-02-05 | End: 2018-02-09 | Stop reason: HOSPADM

## 2018-02-05 RX ORDER — BENZONATATE 100 MG/1
100 CAPSULE ORAL 3 TIMES DAILY PRN
Status: DISCONTINUED | OUTPATIENT
Start: 2018-02-05 | End: 2018-02-09 | Stop reason: HOSPADM

## 2018-02-05 RX ORDER — ONDANSETRON 4 MG/1
4 TABLET, FILM COATED ORAL EVERY 6 HOURS PRN
Status: DISCONTINUED | OUTPATIENT
Start: 2018-02-05 | End: 2018-02-09 | Stop reason: HOSPADM

## 2018-02-05 RX ORDER — ONDANSETRON 4 MG/1
4 TABLET, ORALLY DISINTEGRATING ORAL ONCE
Status: COMPLETED | OUTPATIENT
Start: 2018-02-05 | End: 2018-02-05

## 2018-02-05 RX ORDER — ECHINACEA PURPUREA EXTRACT 125 MG
2 TABLET ORAL AS NEEDED
Status: DISCONTINUED | OUTPATIENT
Start: 2018-02-05 | End: 2018-02-09 | Stop reason: HOSPADM

## 2018-02-05 RX ORDER — LORAZEPAM 0.5 MG/1
0.5 TABLET ORAL
Status: DISCONTINUED | OUTPATIENT
Start: 2018-02-09 | End: 2018-02-09 | Stop reason: HOSPADM

## 2018-02-05 RX ORDER — FAMOTIDINE 20 MG/1
20 TABLET, FILM COATED ORAL 2 TIMES DAILY PRN
Status: DISCONTINUED | OUTPATIENT
Start: 2018-02-05 | End: 2018-02-09 | Stop reason: HOSPADM

## 2018-02-05 RX ORDER — ALUMINA, MAGNESIA, AND SIMETHICONE 2400; 2400; 240 MG/30ML; MG/30ML; MG/30ML
15 SUSPENSION ORAL EVERY 6 HOURS PRN
Status: DISCONTINUED | OUTPATIENT
Start: 2018-02-05 | End: 2018-02-09 | Stop reason: HOSPADM

## 2018-02-05 RX ORDER — MULTIVITAMIN
1 TABLET ORAL DAILY
Status: DISCONTINUED | OUTPATIENT
Start: 2018-02-05 | End: 2018-02-09 | Stop reason: HOSPADM

## 2018-02-05 RX ORDER — LORAZEPAM 0.5 MG/1
0.5 TABLET ORAL EVERY 4 HOURS PRN
Status: DISCONTINUED | OUTPATIENT
Start: 2018-02-09 | End: 2018-02-09 | Stop reason: HOSPADM

## 2018-02-05 RX ORDER — HYDROXYZINE 50 MG/1
50 TABLET, FILM COATED ORAL EVERY 6 HOURS PRN
Status: DISCONTINUED | OUTPATIENT
Start: 2018-02-05 | End: 2018-02-09 | Stop reason: HOSPADM

## 2018-02-05 RX ORDER — TRAZODONE HYDROCHLORIDE 50 MG/1
50 TABLET ORAL NIGHTLY PRN
Status: DISCONTINUED | OUTPATIENT
Start: 2018-02-05 | End: 2018-02-09 | Stop reason: HOSPADM

## 2018-02-05 RX ORDER — BENZTROPINE MESYLATE 1 MG/ML
0.5 INJECTION INTRAMUSCULAR; INTRAVENOUS DAILY PRN
Status: DISCONTINUED | OUTPATIENT
Start: 2018-02-05 | End: 2018-02-09 | Stop reason: HOSPADM

## 2018-02-05 RX ORDER — PANTOPRAZOLE SODIUM 40 MG/1
40 TABLET, DELAYED RELEASE ORAL EVERY MORNING
Status: DISCONTINUED | OUTPATIENT
Start: 2018-02-05 | End: 2018-02-09 | Stop reason: HOSPADM

## 2018-02-05 RX ORDER — ESCITALOPRAM OXALATE 10 MG/1
10 TABLET ORAL DAILY
Status: DISCONTINUED | OUTPATIENT
Start: 2018-02-05 | End: 2018-02-08

## 2018-02-05 RX ORDER — CLONIDINE HYDROCHLORIDE 0.1 MG/1
0.1 TABLET ORAL ONCE
Status: COMPLETED | OUTPATIENT
Start: 2018-02-05 | End: 2018-02-05

## 2018-02-05 RX ORDER — BENZTROPINE MESYLATE 1 MG/1
1 TABLET ORAL DAILY PRN
Status: DISCONTINUED | OUTPATIENT
Start: 2018-02-05 | End: 2018-02-09 | Stop reason: HOSPADM

## 2018-02-05 RX ADMIN — LORAZEPAM 2 MG: 2 TABLET ORAL at 04:34

## 2018-02-05 RX ADMIN — Medication 1 TABLET: at 09:40

## 2018-02-05 RX ADMIN — PANTOPRAZOLE SODIUM 40 MG: 40 TABLET, DELAYED RELEASE ORAL at 12:54

## 2018-02-05 RX ADMIN — QUETIAPINE FUMARATE 25 MG: 25 TABLET, FILM COATED ORAL at 21:34

## 2018-02-05 RX ADMIN — ONDANSETRON 4 MG: 4 TABLET, FILM COATED ORAL at 09:41

## 2018-02-05 RX ADMIN — BUSPIRONE HYDROCHLORIDE 10 MG: 10 TABLET ORAL at 21:34

## 2018-02-05 RX ADMIN — IBUPROFEN 600 MG: 600 TABLET ORAL at 12:54

## 2018-02-05 RX ADMIN — BUSPIRONE HYDROCHLORIDE 10 MG: 10 TABLET ORAL at 12:54

## 2018-02-05 RX ADMIN — ONDANSETRON 4 MG: 4 TABLET, ORALLY DISINTEGRATING ORAL at 02:59

## 2018-02-05 RX ADMIN — CLONIDINE HYDROCHLORIDE 0.1 MG: 0.1 TABLET ORAL at 03:00

## 2018-02-05 RX ADMIN — ESCITALOPRAM 10 MG: 10 TABLET, FILM COATED ORAL at 12:54

## 2018-02-05 RX ADMIN — LORAZEPAM 2 MG: 2 TABLET ORAL at 09:41

## 2018-02-05 RX ADMIN — LORAZEPAM 2 MG: 2 TABLET ORAL at 06:24

## 2018-02-05 RX ADMIN — THIAMINE HCL (VITAMIN B1) 50 MG TABLET 100 MG: at 09:40

## 2018-02-05 RX ADMIN — LORAZEPAM 2 MG: 2 TABLET ORAL at 14:27

## 2018-02-05 NOTE — PLAN OF CARE
Problem: BH Patient Care Overview (Adult)  Goal: Plan of Care Review  Outcome: Ongoing (interventions implemented as appropriate)   02/05/18 1552   Coping/Psychosocial Response Interventions   Plan Of Care Reviewed With patient   Coping/Psychosocial   Patient Agreement with Plan of Care agrees   Patient Care Overview   Consent Given to Review Plan with Refused to sign releases for family at this time.    Progress progress toward functional goals as expected   Outcome Evaluation   Outcome Summary/Follow up Plan Completed the social history assessment and reviewed the treatment plans. The patient was agreeable to meet with the .      Goal: Interdisciplinary Rounds/Family Conference  Outcome: Ongoing (interventions implemented as appropriate)   02/05/18 1552   Interdisciplinary Rounds/Family Conf   Summary Will discuss this patient's case with the treatment team.    Participants psychiatrist;nursing;social work     Goal: Individualization and Mutuality  Outcome: Ongoing (interventions implemented as appropriate)   02/05/18 1552   Behavioral Health Screens   Patient Personal Strengths resilient;resourceful;family/social support;community support;stable living environment   Patient Personal Strengths Comment Willingness to come to treatment at the request of his family.   Patient Vulnerabilities Poor insight and poor degree of motivation.    Individualization   Patient Specific Goals Maintain sobriety. Fix up his house.   Patient Specific Interventions Individual and group therapy.      Goal: Discharge Needs Assessment  Outcome: Ongoing (interventions implemented as appropriate)   02/05/18 1552   Discharge Needs Assessment   Concerns To Be Addressed discharge planning concerns;substance/tobacco abuse/use concerns   Readmission Within The Last 30 Days no previous admission in last 30 days   Community Agency Name[facility]S) [facility].    Current Discharge Risk substance abuse   Discharge Planning Comments The  patient will hopefully find transportation so that he can follow up with [facility].    Discharge Needs Assessment   Outpatient/Agency/Support Group Needs 12 step program (specify);support group(s) (specify);outpatient substance abuse treatment (specify)   Anticipated Discharge Disposition home or self-care   Discharge Disposition home or self-care   Living Environment   Transportation Available family or friend will provide       [id number]  D: Met with the patient in the office, completing the social history assessment and reviewing the treatment plans the patient was agreeable. The patient is a 57-year-old  male currently residing in [place], [place] where he has been living alone for 5 years. He has a 10th grade education. He is unemployed, having last worked in . He receives disability income now. He served in the [employer] for 3 years as well. 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 He came here for detoxification from alcohol at the request of his brother and sister. He began drinking again around Johnathan time after his girlfriend left him. He seemed to be identifying himself as a binge drinker, though he did not use that term. He planned to return home upon discharge. He did not want to do residential treatment, because he was already worried about someone stealing his belongings just being here. He did not want to come here to begin with. His family had also apparently believed this to be a 30 days program. He was agreeable to see someone on an outpatient basis at [facility]; however, he had not mode of transportation. He was agreeable to meet with the  here to possibly resolve this.     A: The patient's affect appeared depressed.  He seemed to have a poor degree of motivation to change.  He seemed poorly insightful, as he did not seem to identify himself as an alcoholic.  It seemed his tendency was to isolate himself  socially.    P: The patient has been placed on a detoxification regimen.  He will be monitored routinely for his safety.  He will be provided with individual and group therapy.  He will meet with the  hopefully on Wednesday afternoon.  It is hoped she will be able to provide some alternative solution to transportation, so that the patient can follow-up with outpatient therapy at [facility].  Perhaps his family would be able to provide transportation, but the patient has not yet signed a release for them.

## 2018-02-05 NOTE — PLAN OF CARE
Problem: BH Patient Care Overview (Adult)  Goal: Plan of Care Review  Outcome: Ongoing (interventions implemented as appropriate)   02/05/18 1652   Coping/Psychosocial Response Interventions   Plan Of Care Reviewed With patient   Coping/Psychosocial   Patient Agreement with Plan of Care agrees   Patient Care Overview   Progress improving       Problem:  Overarching Goals  Goal: Adheres to Safety Considerations for Self and Others  Outcome: Ongoing (interventions implemented as appropriate)    Goal: Optimized Coping Skills in Response to Life Stressors  Outcome: Ongoing (interventions implemented as appropriate)    Goal: Develops/Participates in Therapeutic [place] to Support Successful Transition  Outcome: Ongoing (interventions implemented as appropriate)

## 2018-02-05 NOTE — ED NOTES
"Intake nurse was in room with patient at and states that patient, \"put his hand on his chest and said it hurt.\" ED PA made aware.      Rody Key RN  02/04/18 1928    "

## 2018-02-05 NOTE — PLAN OF CARE
Problem: BH Patient Care Overview (Adult)  Goal: Plan of Care Review  Outcome: Ongoing (interventions implemented as appropriate)   02/05/18 0589   Coping/Psychosocial Response Interventions   Plan Of Care Reviewed With patient   Coping/Psychosocial   Patient Agreement with Plan of Care agrees   Patient Care Overview   Progress no change   Outcome Evaluation   Outcome Summary/Follow up Plan Pt new admit

## 2018-02-05 NOTE — ED PROVIDER NOTES
Subjective   History of Present Illness    Review of Systems    Past Medical History:   Diagnosis Date   • Alcohol abuse    • Anxiety    • Chronic kidney disease    • COPD (chronic obstructive pulmonary disease)    • Depression    • Hypertension    • Myocardial infarction    • Withdrawal symptoms, alcohol        No Known Allergies    Past Surgical History:   Procedure Laterality Date   • HERNIA REPAIR         Family History   Problem Relation Age of Onset   • Diabetes Mother    • Hypertension Mother    • Heart attack Father    • No Known Problems Sister    • No Known Problems Brother        Social History     Social History   • Marital status: Single     Spouse name: N/A   • Number of children: N/A   • Years of education: N/A     Social History Main Topics   • Smoking status: Current Every Day Smoker     Packs/day: 1.00     Types: Cigarettes   • Smokeless tobacco: Never Used   • Alcohol use Yes      Comment: pint vodka 3-4x a week since December   • Drug use: No   • Sexual activity: Defer     Other Topics Concern   • None     Social History Narrative           Objective   Physical Exam    Procedures         ED Course  ED Course                  MDM  Number of Diagnoses or Management Options  Alcoholic intoxication without complication: new and requires workup     Amount and/or Complexity of Data Reviewed  Clinical lab tests: reviewed and ordered  Tests in the medicine section of CPT®: ordered and reviewed    Risk of Complications, Morbidity, and/or Mortality  Presenting problems: moderate  Diagnostic procedures: moderate  Management options: moderate    Patient Progress  Patient progress: improved      Final diagnoses:   Alcoholic intoxication without complication            Martina Hung, APRN  02/05/18 0735

## 2018-02-05 NOTE — H&P
"INITIAL PSYCHIATRIC HISTORY & PHYSICAL    Patient Identification:  Name:     Gregor Fish  Age:    57 y.o.  Sex:    male  :     1960  MRN:    [id number]  Visit Number:    [id number]  Primary Care Physician:    TAMMI Samson    SUBJECTIVE    CC: \"I'm feeling sick\"    HPI: Gregor Fish is a 57 y.o.  male who is 10th grade educated and literate.  He is unemployed time and receives Social Security disability.  Patient was  3 times and  3 times.  He has 4 grownup children.  He lives in [facility].  He has history of one detox in [place] in the .  Patient has come to the emergency Department of [facility] requesting detox.  Patient has been drinking at least half a pint of vodka 3-4 times a week.  At time of admission his blood alcohol level was 358 and liver enzymes are elevated however urine drug screening is negative.  Patient withdrawal symptoms are feeling sick, and tremors, shaky inside and outside, nausea and upset stomach.,  Abdominal cramps.,  Sweating, feeling content cold and craving.  Patient says he feels depressed and rated depression as 6 on a scale of 1-10 and anxiety 8 on a scale of 1-10.  He does not feel hopeless neither he feels helpless and worthless.  He does not have any thoughts of suicide and does not have any thoughts of homicide.  His sleep has been poor and experiencing initial, intermittent and terminal insomnia.  Appetite has been poor.  Patient has faced negative consequences of drinking such as legal issue, he has had 4 DUIs and does not have his license.  He also had family issues and had 3 divorces.  Patient lives by himself in [facility] and since he does not drive basically is isolated.  Patient is admitted for crisis intervention, stabilization and detox.    PAST PSYCHIATRIC HX:  None reported    SUBSTANCE USE HX:  As history of drinking from young age.  Had one detox admission in [place] in the .    SOCIAL HX:  He was " born in [place] and raised in [place].  Was  3 times and has 4 children 2 daughters and 2 sons and they are all grown ups he also has grandchildren.  The seventh grade educated and at this time he receives Social Security and disability.    Past Medical History:   Diagnosis Date   • Alcohol abuse    • Anxiety    • Chronic kidney disease    • COPD (chronic obstructive pulmonary disease)    • Depression    • Hypertension    • Myocardial infarction    • Withdrawal symptoms, alcohol        Past Surgical History:   Procedure Laterality Date   • HERNIA REPAIR         Family History   Problem Relation Age of Onset   • Diabetes Mother    • Hypertension Mother    • Heart attack Father    • No Known Problems Sister    • No Known Problems Brother    Father was an alcoholic.  Both parents are .  Patient had 2 brothers who  and both of them are alcoholics.  He has 2 sisters living.      Prescriptions Prior to Admission   Medication Sig Dispense Refill Last Dose   • atorvastatin (LIPITOR) 80 MG tablet Take 80 mg by mouth Every Night.   2/3/2018 at pm   • busPIRone (BUSPAR) 10 MG tablet Take 10 mg by mouth 2 (Two) Times a Day.   2018 at am   • escitalopram (LEXAPRO) 20 MG tablet Take 10 mg by mouth Daily.   2018 at am   • omeprazole (priLOSEC) 20 MG capsule Take 20 mg by mouth Daily.   2018 at am   • QUEtiapine (SEROquel) 25 MG tablet Take 25 mg by mouth Every Night.   2/3/2018 at pm       Reviewed available past medical and psychiatric records.    ALLERGIES:  Review of patient's allergies indicates no known allergies.    Temp:  [98 °F (36.7 °C)-99.1 °F (37.3 °C)] 98 °F (36.7 °C)  Heart Rate:  [73-87] 87  Resp:  [16-20] 16  BP: (115-158)/() 145/93    REVIEW OF SYSTEMS:  Review of Systems   Constitutional: Positive for diaphoresis and fatigue.   HENT: Negative.    Eyes: Negative.    Respiratory: Negative.    Cardiovascular: Negative.    Gastrointestinal: Positive for diarrhea and nausea.    Endocrine: Negative.    Genitourinary: Negative.    Musculoskeletal: Negative.    Skin: Negative.    Allergic/Immunologic: Negative.    Neurological: Positive for tremors and weakness.   Hematological: Negative.       See HPI for psychiatric ROS  OBJECTIVE    PHYSICAL EXAM:  Physical Exam   Constitutional: He is oriented to person, place, and time. He appears well-developed and well-nourished.   HENT:   Head: Normocephalic and atraumatic.   Right Ear: External ear normal.   Left Ear: External ear normal.   Nose: Nose normal.   Mouth/Throat: Oropharynx is clear and moist.   Eyes: Conjunctivae and EOM are normal. Pupils are equal, round, and reactive to light.   Neck: Normal range of motion. Neck supple.   Cardiovascular: Normal rate and regular rhythm.    Systolic murmur   Pulmonary/Chest: Effort normal and breath sounds normal.   Abdominal: Soft. Bowel sounds are normal.   Musculoskeletal: Normal range of motion.   Neurological: He is alert and oriented to person, place, and time.   Skin: Skin is warm and dry.       MENTAL STATUS EXAM:   Patient is a 57-year-old  male in the hospital gown and he is lying in bed he is too sick to get up and come to the office.  He looks disheveled.  When he talks  has poor dentition  .  Affect is restricted.  Mood is depressed and anxious and rates it 6-7 on a scale of 1-10.  He denies being hopeless, helpless or worthless.  He adamantly denies thoughts of suicide and homicide.  He does not have any thought disorder neither his experiencing any perceptual distortion such as auditory and visual hallucinations.  His sensorium is intact saw his immediate, recent and recent past and long-term memories.  His intellect is average.  His insight and judgment are adequate.                   Imaging Results (last 24 hours)     ** No results found for the last 24 hours. **           ECG/EMG Results (most recent)     Procedure Component Value Units Date/Time    ECG 12 Lead [[id number]]  Collected:  02/05/18 0526     Updated:  02/05/18 1449    Narrative:       Test Reason : Potential adverse reaction to medications.  Blood Pressure : **/** mmHG  Vent. Rate : 075 BPM     Atrial Rate : 075 BPM     P-R Int : 160 ms          QRS Dur : 066 ms      QT Int : 394 ms       P-R-T Axes : 064 076 096 degrees     QTc Int : 439 ms    Normal sinus rhythm  No previous ECGs available  Confirmed by Haja Myles ([id number]) on 2/5/2018 2:49:25 PM    Referred By:  MASHA           Confirmed By:Haja Myles           Lab Results   Component Value Date    GLUCOSE 173 (H) 02/04/2018    BUN 17 02/04/2018    CREATININE 0.81 02/04/2018    EGFRIFNONA 98 02/04/2018    BCR 21.0 02/04/2018    CO2 24.9 02/04/2018    CALCIUM 9.2 02/04/2018    ALBUMIN 4.70 02/04/2018    LABIL2 1.9 02/04/2018     (H) 02/04/2018    ALT 90 (H) 02/04/2018       Lab Results   Component Value Date    WBC 8.45 02/04/2018    HGB 12.2 (L) 02/04/2018    HCT 36.4 (L) 02/04/2018    MCV 86.9 02/04/2018     02/04/2018       Pain Management Panel     Pain Management Panel Latest Ref Rng & Units 2/4/2018    AMPHETAMINES SCREEN, URINE Negative Negative    BARBITURATES SCREEN Negative Negative    BENZODIAZEPINE SCREEN, URINE Negative Negative    BUPRENORPHINE Negative Negative    COCAINE SCREEN, URINE Negative Negative    METHADONE SCREEN, URINE Negative Negative          Brief Urine Lab Results  (Last result in the past 365 days)      Color   Clarity   Blood   Leuk Est   Nitrite   Protein   CREAT   Urine HCG        02/04/18 1626 Dark Yellow(A) Cloudy(A) Negative Negative Negative 100 mg/dL (2+)(A)               Reviewed labs and studies done with this admission.       ASSESSMENT & PLAN:      Patient Active Problem List   Diagnosis Code   • Coronary artery disease involving native coronary artery of native heart without angina pectoris I25.10   • Essential hypertension I10   • Mixed hyperlipidemia E78.2   • Alcohol dependence with withdrawal F10.239      Reviewed patient's chart.  Discussed patient's case with the treatment team.  The patient is high risk due to heavy alcohol use and withdrawals in the setting of coronary artery disease.    The patient has been admitted for safety and stabilization.  Patient will be monitored for Safety 24-7 daily and maintained on appropriate detox medications and when necessary medications.  .  The patient will have individual and group therapy with a master's level therapist.  Patient will be initiated to CD program and twelve-step protocol.  A master treatment plan will be developed and agreed upon by the patient and his/her treatment team.  The patient's estimated length of stay in the hospital is 5-7 days.       This note was generated using a scribe,  The work documented in this note was completed, reviewed, and approved by the attending psychiatrist as designated Dr.Salim ortega.

## 2018-02-05 NOTE — ED NOTES
"Assessed patient for chest pain, patient states, \"It is just some heartburn, I get it a lot.\" ED PA made aware. NADN. Patient denies any other symptoms such as SOB or nausea. Made aware to alert staff of any other symptoms.      Rody Key RN  02/04/18 1928    "

## 2018-02-06 PROCEDURE — 99232 SBSQ HOSP IP/OBS MODERATE 35: CPT | Performed by: PSYCHIATRY & NEUROLOGY

## 2018-02-06 RX ADMIN — IBUPROFEN 600 MG: 600 TABLET ORAL at 14:46

## 2018-02-06 RX ADMIN — ESCITALOPRAM 10 MG: 10 TABLET, FILM COATED ORAL at 08:13

## 2018-02-06 RX ADMIN — ONDANSETRON 4 MG: 4 TABLET, FILM COATED ORAL at 08:12

## 2018-02-06 RX ADMIN — LORAZEPAM 2 MG: 2 TABLET ORAL at 02:46

## 2018-02-06 RX ADMIN — IBUPROFEN 600 MG: 600 TABLET ORAL at 08:12

## 2018-02-06 RX ADMIN — IBUPROFEN 600 MG: 600 TABLET ORAL at 20:46

## 2018-02-06 RX ADMIN — QUETIAPINE FUMARATE 25 MG: 25 TABLET, FILM COATED ORAL at 20:45

## 2018-02-06 RX ADMIN — LORAZEPAM 2 MG: 2 TABLET ORAL at 08:12

## 2018-02-06 RX ADMIN — LORAZEPAM 2 MG: 2 TABLET ORAL at 21:01

## 2018-02-06 RX ADMIN — LORAZEPAM 2 MG: 2 TABLET ORAL at 14:46

## 2018-02-06 RX ADMIN — THIAMINE HCL (VITAMIN B1) 50 MG TABLET 100 MG: at 08:12

## 2018-02-06 RX ADMIN — BUSPIRONE HYDROCHLORIDE 10 MG: 10 TABLET ORAL at 20:45

## 2018-02-06 RX ADMIN — HYDROXYZINE HYDROCHLORIDE 50 MG: 50 TABLET ORAL at 20:46

## 2018-02-06 RX ADMIN — BUSPIRONE HYDROCHLORIDE 10 MG: 10 TABLET ORAL at 08:13

## 2018-02-06 RX ADMIN — PANTOPRAZOLE SODIUM 40 MG: 40 TABLET, DELAYED RELEASE ORAL at 06:33

## 2018-02-06 RX ADMIN — Medication 1 TABLET: at 08:13

## 2018-02-06 NOTE — PROGRESS NOTES
Spoke with , Uyen Puri at [facility]. She stated that she would be able to transport the patient up to 3 months to his appts at [facility]. Scheduled an appt for 2/14/18 and she is aware of the appt and will plan to transport him to the appt.

## 2018-02-06 NOTE — PLAN OF CARE
Problem: BH Patient Care Overview (Adult)  Goal: Plan of Care Review  Outcome: Ongoing (interventions implemented as appropriate)   02/06/18 7366   Coping/Psychosocial Response Interventions   Plan Of Care Reviewed With patient   Coping/Psychosocial   Patient Agreement with Plan of Care agrees   Patient Care Overview   Progress improving   Outcome Evaluation   Outcome Summary/Follow up Plan Pt with gross tremors. Pt states he is feeling much better

## 2018-02-06 NOTE — DISCHARGE INSTR - APPOINTMENTS
[facility]  [address]  [place] [address]      [phone]    Appt: 2/14/18 @ 11:45 am with Ailyn Obrien. , Uyen Puri will transport him to his appt.    [facility] [place] [address] [phone]  Admission scheduled for Monday, February 12 at 12:00.

## 2018-02-06 NOTE — PROGRESS NOTES
[phone]  D: Met with the patient in the office, assessing his needs and discussing aftercare treatment options. The patient was agreeable. The patient reported he was feeling better than he had on previous days, saying he was able to get around better today. In regard to aftercare, the patient reported his family wanted him to go to a 30 days program from here. The therapist discussed his options, mentioning [facility] and [facility]. These programs were to far away to satisfy the patient's preferences. The therapist then mentioned [facility] and informed the patient of the $400 admission fee. The patient was unsure if he could pay for treatment or if his family could pay. He called Debra at [facility] and discussed this further. He planned also to call his family this evening and ask if they could sponsor some of his treatment.     A: The patient's affect appeared depressed. He seemed to have been hesitant about going to residential treatment because he worried it would cause him to lose his social security income. The patient's fears seemed assuaged with the therapist's assurance that 30 days in treatment would not cost him his income.     P: The patient will continue treatment. The patient will call his family this evening to ask about sponsoring his treatment at [facility]. The fist available bed will be Monday, February 12, unless there is a cancellation. The patient will also be meeting with [facility] , tomorrow.

## 2018-02-06 NOTE — PROGRESS NOTES
"INPATIENT PSYCHIATRIC PROGRESS NOTE    Name:  Gregor Fish  :  1960  MRN:  [id number]  Visit Number:  [id number]  Length of stay:  1    SUBJECTIVE  CC: alcohol withdrawals    INTERVAL HISTORY:  The patient states that he is feeling a little better. States that detox medications, and being able to eat better has helped.  Depression rating 8/10  Anxiety rating 8/10  Sleep: good  Withdrawal sx: denies  Cravin/10    Review of Systems   Constitutional: Negative.    HENT: Negative.    Eyes: Negative.    Respiratory: Negative.    Cardiovascular: Negative.    Gastrointestinal: Negative.        OBJECTIVE    Temp:  [97.6 °F (36.4 °C)-98.8 °F (37.1 °C)] 97.6 °F (36.4 °C)  Heart Rate:  [70-87] 87  Resp:  [16-18] 18  BP: (140-157)/(84-97) 142/90    MENTAL STATUS EXAM:  Appearance:Casually dressed, good hygeine.   Cooperation:Cooperative  Psychomotor: No psychomotor agitation/retardation, No EPS, No motor tics  Speech-normal rate, amount.  Mood \"good\"   Affect-congruent, appropriate, stable  Thought Content-goal directed, no delusional material present  Thought process-linear, organized.  Suicidality: No SI  Homicidality: No HI  Perception: No AH/VH  Insight-fair   Judgement-fair    Lab Results (last 24 hours)     ** No results found for the last 24 hours. **             Imaging Results (last 24 hours)     ** No results found for the last 24 hours. **             ECG/EMG Results (most recent)     Procedure Component Value Units Date/Time    ECG 12 Lead [[id number]] Collected:  18     Updated:  18    Narrative:       Test Reason : Potential adverse reaction to medications.  Blood Pressure : **/** mmHG  Vent. Rate : 075 BPM     Atrial Rate : 075 BPM     P-R Int : 160 ms          QRS Dur : 066 ms      QT Int : 394 ms       P-R-T Axes : 064 076 096 degrees     QTc Int : 439 ms    Normal sinus rhythm  No previous ECGs available  Confirmed by Haja Myles ([id number]) on 2018 2:49:25 PM    Referred " By:  MASHA           Confirmed By:Haja Myles           ALLERGIES: Review of patient's allergies indicates no known allergies.      Current Facility-Administered Medications:   •  aluminum-magnesium hydroxide-simethicone (MAALOX MAX) 400-400-40 MG/5ML suspension 15 mL, 15 mL, Oral, Q6H PRN, Sonia Bello MD  •  benzonatate (TESSALON) capsule 100 mg, 100 mg, Oral, TID PRN, Sonia Bello MD  •  benztropine (COGENTIN) tablet 1 mg, 1 mg, Oral, Daily PRN **OR** benztropine (COGENTIN) injection 0.5 mg, 0.5 mg, Intramuscular, Daily PRN, Sonia Bello MD  •  busPIRone (BUSPAR) tablet 10 mg, 10 mg, Oral, BID, Javier Daniel MD, 10 mg at 02/06/18 0813  •  escitalopram (LEXAPRO) tablet 10 mg, 10 mg, Oral, Daily, Javier Daniel MD, 10 mg at 02/06/18 0813  •  famotidine (PEPCID) tablet 20 mg, 20 mg, Oral, BID PRN, Sonia Bello MD  •  hydrOXYzine (ATARAX) tablet 50 mg, 50 mg, Oral, Q6H PRN, Sonia Bello MD  •  ibuprofen (ADVIL,MOTRIN) tablet 600 mg, 600 mg, Oral, Q6H PRN, Sonia Bello MD, 600 mg at 02/06/18 0812  •  loperamide (IMODIUM) capsule 2 mg, 2 mg, Oral, 4x Daily PRN, Sonia Bello MD  •  LORazepam (ATIVAN) tablet 2 mg, 2 mg, Oral, 3 times per day, 2 mg at 02/06/18 0812 **FOLLOWED BY** [START ON 2/7/2018] LORazepam (ATIVAN) tablet 1.5 mg, 1.5 mg, Oral, 3 times per day **FOLLOWED BY** [START ON 2/8/2018] LORazepam (ATIVAN) tablet 1 mg, 1 mg, Oral, 3 times per day **FOLLOWED BY** [START ON 2/9/2018] LORazepam (ATIVAN) tablet 0.5 mg, 0.5 mg, Oral, 3 times per day, Sonia Bello MD  •  LORazepam (ATIVAN) tablet 2 mg, 2 mg, Oral, Q4H PRN **FOLLOWED BY** [START ON 2/7/2018] LORazepam (ATIVAN) tablet 1.5 mg, 1.5 mg, Oral, Q4H PRN **FOLLOWED BY** [START ON 2/8/2018] LORazepam (ATIVAN) tablet 1 mg, 1 mg, Oral, Q4H PRN **FOLLOWED BY** [START ON 2/9/2018] LORazepam (ATIVAN) tablet 0.5 mg, 0.5 mg, Oral, Q4H PRN, Sonia Bello MD  •  magnesium hydroxide (MILK OF MAGNESIA) suspension 2400 mg/10mL 10 mL, 10 mL, Oral, Daily PRN, Sonia  MD Ace  •  multivitamin (DAILY JAI) tablet 1 tablet, 1 tablet, Oral, Daily, Sonia Bello MD, 1 tablet at 02/06/18 0813  •  ondansetron (ZOFRAN) tablet 4 mg, 4 mg, Oral, Q6H PRN, Sonia Bello MD, 4 mg at 02/06/18 0812  •  pantoprazole (PROTONIX) EC tablet 40 mg, 40 mg, Oral, QAM, Javier Daniel MD, 40 mg at 02/06/18 0633  •  QUEtiapine (SEROquel) tablet 25 mg, 25 mg, Oral, Nightly, Javier Daniel MD, 25 mg at 02/05/18 2134  •  sodium chloride (OCEAN) nasal spray 2 spray, 2 spray, Each Nare, PRN, Sonia Bello MD  •  traZODone (DESYREL) tablet 50 mg, 50 mg, Oral, Nightly PRN, Sonia Bello MD  •  vitamin B-1 tablet 100 mg, 100 mg, Oral, Daily, Sonia Bello MD, 100 mg at 02/06/18 0812    ASSESSMENT & PLAN:    Principal Problem:    Alcohol dependence with withdrawal  Plan: Ativan detox. Pt states that he has been taking Seroquel to help him sleep and Lexapro and Buspar also but doesn't know why he takes these medications. Will stop Lexapro at this time to avoid drug interaction with Seroquel.    Active Problems:    Coronary artery disease involving native coronary artery of native heart without angina pectoris  Plan: Pt reports that he is not on any medications. States that he had a dye test on his heart and was told it was normal.      Essential hypertension  Plan: Pt reports that he is not on any medication for htn. Will continue to monitor.      Mixed hyperlipidemia  Plan: Pt reports that he took atorvastatin for his cholestero      Suicide precautions: None    Behavioral Health Treatment Plan and Problem List: I have reviewed and approved the Behavioral Health Treatment Plan and Problem list.  The patient has had a chance to review and agrees with the treatment plan.     Clinician:  Javier Daniel MD  02/06/18  1:29 PM

## 2018-02-07 PROCEDURE — 99232 SBSQ HOSP IP/OBS MODERATE 35: CPT | Performed by: PSYCHIATRY & NEUROLOGY

## 2018-02-07 RX ADMIN — ESCITALOPRAM 10 MG: 10 TABLET, FILM COATED ORAL at 08:06

## 2018-02-07 RX ADMIN — Medication 1 TABLET: at 08:06

## 2018-02-07 RX ADMIN — LORAZEPAM 1.5 MG: 1 TABLET ORAL at 08:05

## 2018-02-07 RX ADMIN — BUSPIRONE HYDROCHLORIDE 10 MG: 10 TABLET ORAL at 08:06

## 2018-02-07 RX ADMIN — LORAZEPAM 1.5 MG: 1 TABLET ORAL at 14:08

## 2018-02-07 RX ADMIN — BUSPIRONE HYDROCHLORIDE 10 MG: 10 TABLET ORAL at 20:44

## 2018-02-07 RX ADMIN — THIAMINE HCL (VITAMIN B1) 50 MG TABLET 100 MG: at 08:06

## 2018-02-07 RX ADMIN — IBUPROFEN 600 MG: 600 TABLET ORAL at 14:09

## 2018-02-07 RX ADMIN — LORAZEPAM 1.5 MG: 1 TABLET ORAL at 21:02

## 2018-02-07 RX ADMIN — QUETIAPINE FUMARATE 25 MG: 25 TABLET, FILM COATED ORAL at 20:44

## 2018-02-07 RX ADMIN — PANTOPRAZOLE SODIUM 40 MG: 40 TABLET, DELAYED RELEASE ORAL at 06:04

## 2018-02-07 NOTE — PROGRESS NOTES
[id number]  D: Met with the patient in the office, assessing his needs and discussing aftercare treatment plans.  The patient was agreeable.  He reported still feeling weak today.  He reported an experience of fatigue, explaining he was becoming tired very easily.  He didn't feel like doing much today other than lying in his bed.  In regard to aftercare, the patient reported he had spoken with his sister who informed him she and his brother were going to try and sponsor his treatment at [place] post discharge.  The patient did not want to call [facility] yet today, preferring to no with more certainty his siblings' ability to help pay for treatment.  The patient suggested he meet with the therapist tomorrow afternoon in order to call his sister and discuss this further before calling [facility] back.    A: The patient's affect appeared somber.  He seemed to have a fair degree of motivation to change.  It seemed his siblings were supportive. The patient seemed worried about the degree of fatigue he was experiencing, wondering aloud if it was the medication causing it.     P: The patient will continue treatment.  He is scheduled to meet with the  this afternoon, though the patient was not confident she would be able to help him with anything.  He may decline to meet with her.  The patient and therapist will meet together tomorrow to call the patient's sister and discuss sponsorship of the program at [place].

## 2018-02-07 NOTE — PLAN OF CARE
Problem: BH Overarching Goals  Goal: Adheres to Safety Considerations for Self and Others  Outcome: Ongoing (interventions implemented as appropriate)    Goal: Optimized Coping Skills in Response to Life Stressors  Outcome: Ongoing (interventions implemented as appropriate)    Goal: Develops/Participates in Therapeutic [place] to Support Successful Transition  Outcome: Ongoing (interventions implemented as appropriate)
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
## 2018-02-08 PROCEDURE — 99232 SBSQ HOSP IP/OBS MODERATE 35: CPT | Performed by: PSYCHIATRY & NEUROLOGY

## 2018-02-08 RX ORDER — SERTRALINE HYDROCHLORIDE 25 MG/1
25 TABLET, FILM COATED ORAL DAILY
Status: DISCONTINUED | OUTPATIENT
Start: 2018-02-08 | End: 2018-02-09 | Stop reason: HOSPADM

## 2018-02-08 RX ADMIN — BUSPIRONE HYDROCHLORIDE 10 MG: 10 TABLET ORAL at 09:05

## 2018-02-08 RX ADMIN — LORAZEPAM 1 MG: 1 TABLET ORAL at 21:22

## 2018-02-08 RX ADMIN — LORAZEPAM 1 MG: 1 TABLET ORAL at 09:05

## 2018-02-08 RX ADMIN — PANTOPRAZOLE SODIUM 40 MG: 40 TABLET, DELAYED RELEASE ORAL at 06:00

## 2018-02-08 RX ADMIN — BUSPIRONE HYDROCHLORIDE 10 MG: 10 TABLET ORAL at 21:21

## 2018-02-08 RX ADMIN — Medication 1 TABLET: at 09:05

## 2018-02-08 RX ADMIN — LORAZEPAM 1.5 MG: 1 TABLET ORAL at 04:39

## 2018-02-08 RX ADMIN — THIAMINE HCL (VITAMIN B1) 50 MG TABLET 100 MG: at 09:05

## 2018-02-08 RX ADMIN — QUETIAPINE FUMARATE 25 MG: 25 TABLET, FILM COATED ORAL at 21:21

## 2018-02-08 RX ADMIN — LORAZEPAM 1 MG: 1 TABLET ORAL at 14:50

## 2018-02-08 RX ADMIN — ESCITALOPRAM 10 MG: 10 TABLET, FILM COATED ORAL at 09:05

## 2018-02-08 RX ADMIN — SERTRALINE 25 MG: 25 TABLET, FILM COATED ORAL at 14:50

## 2018-02-08 NOTE — PLAN OF CARE
Problem: BH Overarching Goals  Goal: Adheres to Safety Considerations for Self and Others  Outcome: Ongoing (interventions implemented as appropriate)    Goal: Optimized Coping Skills in Response to Life Stressors  Outcome: Ongoing (interventions implemented as appropriate)    Goal: Develops/Participates in Therapeutic [place] to Support Successful Transition  Outcome: Ongoing (interventions implemented as appropriate)

## 2018-02-08 NOTE — PROGRESS NOTES
"INPATIENT PSYCHIATRIC PROGRESS NOTE    Name:  Gregor Fish  :  1960  MRN:  [id number]  Visit Number:  [id number]  Length of stay:  3    SUBJECTIVE  CC: alcohol withdrawals    INTERVAL HISTORY:  The patient states that he is thinking about going to residential rehab at [facility], but it will be next week, and he states that he may be able go home in the interim when his detox is completed.    Depression rating 8/10  Anxiety rating 8/10  Sleep: good  Withdrawal sx: fatigue  Cravin/10    Review of Systems   Constitutional: Negative.    HENT: Negative.    Eyes: Negative.    Respiratory: Negative.    Cardiovascular: Negative.    Gastrointestinal: Negative.        OBJECTIVE    Temp:  [97.2 °F (36.2 °C)-98.8 °F (37.1 °C)] 97.2 °F (36.2 °C)  Heart Rate:  [] 87  Resp:  [18-20] 20  BP: (140-168)/() 140/92    MENTAL STATUS EXAM:  Appearance:Casually dressed, good hygeine.   Cooperation:Cooperative  Psychomotor: No psychomotor agitation/retardation, No EPS, No motor tics  Speech-normal rate, amount.  Mood \"depressed\"   Affect-congruent, appropriate, stable  Thought Content-goal directed, no delusional material present  Thought process-linear, organized.  Suicidality: No SI  Homicidality: No HI  Perception: No AH/VH  Insight-fair   Judgement-fair    Lab Results (last 24 hours)     ** No results found for the last 24 hours. **             Imaging Results (last 24 hours)     ** No results found for the last 24 hours. **             ECG/EMG Results (most recent)     Procedure Component Value Units Date/Time    ECG 12 Lead [[id number]] Collected:  18     Updated:  18    Narrative:       Test Reason : Potential adverse reaction to medications.  Blood Pressure : **/** mmHG  Vent. Rate : 075 BPM     Atrial Rate : 075 BPM     P-R Int : 160 ms          QRS Dur : 066 ms      QT Int : 394 ms       P-R-T Axes : 064 076 096 degrees     QTc Int : 439 ms    Normal sinus rhythm  No previous ECGs " available  Confirmed by Haja Myles ([id number]) on 2018 2:49:25 PM    Referred By:  MASHA           Confirmed By:Haja Myles           ALLERGIES: Review of patient's allergies indicates no known allergies.      Current Facility-Administered Medications:   •  aluminum-magnesium hydroxide-simethicone (MAALOX MAX) 400-400-40 MG/5ML suspension 15 mL, 15 mL, Oral, Q6H PRN, Sonia Bello MD  •  benzonatate (TESSALON) capsule 100 mg, 100 mg, Oral, TID PRN, Sonia Bello MD  •  benztropine (COGENTIN) tablet 1 mg, 1 mg, Oral, Daily PRN **OR** benztropine (COGENTIN) injection 0.5 mg, 0.5 mg, Intramuscular, Daily PRN, Sonia Bello MD  •  busPIRone (BUSPAR) tablet 10 mg, 10 mg, Oral, BID, Javier Daniel MD, 10 mg at 18 09  •  escitalopram (LEXAPRO) tablet 10 mg, 10 mg, Oral, Daily, Javier Daniel MD, 10 mg at 18 09  •  famotidine (PEPCID) tablet 20 mg, 20 mg, Oral, BID PRN, Sonia Bello MD  •  hydrOXYzine (ATARAX) tablet 50 mg, 50 mg, Oral, Q6H PRN, Sonia Bello MD, 50 mg at 186  •  ibuprofen (ADVIL,MOTRIN) tablet 600 mg, 600 mg, Oral, Q6H PRN, Sonia Bello MD, 600 mg at 18 1409  •  loperamide (IMODIUM) capsule 2 mg, 2 mg, Oral, 4x Daily PRN, Sonia Bello MD  •  [COMPLETED] LORazepam (ATIVAN) tablet 2 mg, 2 mg, Oral, 3 times per day, 2 mg at 18 **FOLLOWED BY** [COMPLETED] LORazepam (ATIVAN) tablet 1.5 mg, 1.5 mg, Oral, 3 times per day, 1.5 mg at 18 **FOLLOWED BY** LORazepam (ATIVAN) tablet 1 mg, 1 mg, Oral, 3 times per day, 1 mg at 18 0905 **FOLLOWED BY** [START ON 2018] LORazepam (ATIVAN) tablet 0.5 mg, 0.5 mg, Oral, 3 times per day, Sonia Bello MD  •  [] LORazepam (ATIVAN) tablet 2 mg, 2 mg, Oral, Q4H PRN **FOLLOWED BY** [] LORazepam (ATIVAN) tablet 1.5 mg, 1.5 mg, Oral, Q4H PRN, 1.5 mg at 18 0439 **FOLLOWED BY** LORazepam (ATIVAN) tablet 1 mg, 1 mg, Oral, Q4H PRN **FOLLOWED BY** [START ON 2018] LORazepam (ATIVAN) tablet 0.5 mg, 0.5  mg, Oral, Q4H PRN, Sonia Bello MD  •  magnesium hydroxide (MILK OF MAGNESIA) suspension 2400 mg/10mL 10 mL, 10 mL, Oral, Daily PRN, Sonia Bello MD  •  multivitamin (DAILY JAI) tablet 1 tablet, 1 tablet, Oral, Daily, Sonia Bello MD, 1 tablet at 02/08/18 0905  •  ondansetron (ZOFRAN) tablet 4 mg, 4 mg, Oral, Q6H PRN, Sonia Bello MD, 4 mg at 02/06/18 0812  •  pantoprazole (PROTONIX) EC tablet 40 mg, 40 mg, Oral, QAM, Javier Daniel MD, 40 mg at 02/08/18 0600  •  QUEtiapine (SEROquel) tablet 25 mg, 25 mg, Oral, Nightly, Javier Daniel MD, 25 mg at 02/07/18 2044  •  sodium chloride (OCEAN) nasal spray 2 spray, 2 spray, Each Nare, PRN, Sonia Bello MD  •  traZODone (DESYREL) tablet 50 mg, 50 mg, Oral, Nightly PRN, Sonia Bello MD  •  vitamin B-1 tablet 100 mg, 100 mg, Oral, Daily, Sonia Bello MD, 100 mg at 02/08/18 0905    ASSESSMENT & PLAN:    Principal Problem:    Alcohol dependence with withdrawal  Plan: Ativan detox. Pt states that he has been taking Seroquel to help him sleep and Lexapro and Buspar also but doesn't know why he takes these medications. Change Lexapro to Zoloft to avoid drug interaction with Seroquel.  Active Problems:    Coronary artery disease involving native coronary artery of native heart without angina pectoris  Plan: Pt reports that he is not on any medications. States that he had a dye test on his heart and was told it was normal.      Essential hypertension  Plan: Pt reports that he is not on any medication for htn. Will continue to monitor.       Mixed hyperlipidemia  Plan: Pt reports that he took atorvastatin for his cholesterol being held as liver enzymes are elevated      MDD rec severe  Plan: Zoloft and Seroquel      PTSD chronic  Plan: Zoloft, Seroquel and Buspar      Suicide precautions: None    Behavioral Health Treatment Plan and Problem List: I have reviewed and approved the Behavioral Health Treatment Plan and Problem list.  The patient has had a chance to review and agrees with  the treatment plan.     Clinician:  Javier Daniel MD  02/08/18  1:02 PM

## 2018-02-08 NOTE — PLAN OF CARE
Problem:  Patient Care Overview (Adult)  Goal: Plan of Care Review  Outcome: Ongoing (interventions implemented as appropriate)   02/08/18 0138   Coping/Psychosocial Response Interventions   Plan Of Care Reviewed With patient   Coping/Psychosocial   Patient Agreement with Plan of Care agrees   Patient Care Overview   Progress improving   Outcome Evaluation   Outcome Summary/Follow up Plan Patient calm and cooperative. Participated in group. Rates anxiety and depression 8/10. Denies cravings. Wd's leg cramps. No problems noted. Will continue to monitor.        Problem:  Overarching Goals  Goal: Adheres to Safety Considerations for Self and Others  Outcome: Ongoing (interventions implemented as appropriate)    Goal: Optimized Coping Skills in Response to Life Stressors  Outcome: Ongoing (interventions implemented as appropriate)    Goal: Develops/Participates in Therapeutic [place] to Support Successful Transition  Outcome: Ongoing (interventions implemented as appropriate)

## 2018-02-08 NOTE — PROGRESS NOTES
"[id number]  D: Met with the patient in the office, for the purpose of calling his brother, Tho, who lived in [place], and discussing his ability to sponsor the patient's treatment at [facility]. Tho reported it would not be a problem. The therapist gave him [facility]'s number and instructed him to call to arrange payment. Tho had some encouraging words for the patient, reminding him this was the first day of the rest of his life, and that it was not a residential sentence but was something that was going help him get better.     The patient and therapist then called [facility]. Their first available bed was still Monday, February 12 at noon. The patient was agreeable.     The patient and therapist then called [facility] to ask if they would be able to keep the patient over the weekend until his bed came available at [facility]. They were full and unable to take the patient.    The patient reported he would be able to stay at his home in [place] if need be. He felt confident he would be able to stay sober until his admission at [facility]. He said he would have no problem finding a ride. He was agreeable to stay here over the weekend if possible. However, his brother had expressed concern about him returning home before [facility], because his nephew was watching the house and would be a negative influence on him.     A: The patient's affect appeared somber. He reported wanting to get this over with. He said this was not his idea to begin with, going to [facility]. However, he reported he was going to have a good attitude about it. He felt it would still be helpful, stating, \"It can't hurt anything, right?\" He did seem to agree alcohol was a problem for him, and he wanted to be free from it.     P: The patient will continue treatment. He will be admitted at [facility] on Monday. It would be best for the patient to stay here until his admission on Monday, if possible. The patient was agreeable. If not possible, he " will go home in the meantime.

## 2018-02-09 VITALS
HEART RATE: 84 BPM | DIASTOLIC BLOOD PRESSURE: 100 MMHG | WEIGHT: 132.2 LBS | SYSTOLIC BLOOD PRESSURE: 158 MMHG | OXYGEN SATURATION: 98 % | RESPIRATION RATE: 16 BRPM | TEMPERATURE: 97.9 F | BODY MASS INDEX: 20.03 KG/M2 | HEIGHT: 68 IN

## 2018-02-09 PROCEDURE — 99238 HOSP IP/OBS DSCHRG MGMT 30/<: CPT | Performed by: PSYCHIATRY & NEUROLOGY

## 2018-02-09 RX ORDER — SERTRALINE HYDROCHLORIDE 25 MG/1
25 TABLET, FILM COATED ORAL DAILY
Qty: 30 TABLET | Refills: 0 | Status: SHIPPED | OUTPATIENT
Start: 2018-02-10 | End: 2022-09-22

## 2018-02-09 RX ADMIN — SERTRALINE 25 MG: 25 TABLET, FILM COATED ORAL at 08:12

## 2018-02-09 RX ADMIN — BUSPIRONE HYDROCHLORIDE 10 MG: 10 TABLET ORAL at 08:12

## 2018-02-09 RX ADMIN — Medication 1 TABLET: at 08:12

## 2018-02-09 RX ADMIN — THIAMINE HCL (VITAMIN B1) 50 MG TABLET 100 MG: at 08:12

## 2018-02-09 RX ADMIN — PANTOPRAZOLE SODIUM 40 MG: 40 TABLET, DELAYED RELEASE ORAL at 09:55

## 2018-02-09 RX ADMIN — LORAZEPAM 0.5 MG: 0.5 TABLET ORAL at 08:12

## 2018-02-09 NOTE — PLAN OF CARE
Problem:  Patient Care Overview (Adult)  Goal: Plan of Care Review  Outcome: Ongoing (interventions implemented as appropriate)   02/09/18 0606   Coping/Psychosocial Response Interventions   Plan Of Care Reviewed With patient   Coping/Psychosocial   Patient Agreement with Plan of Care agrees   Patient Care Overview   Progress improving   Outcome Evaluation   Outcome Summary/Follow up Plan Pt. stable and slept all night. Anx/Depr 5/5 Craving 0. WD sx slight tremor, denies other symptms. Denies hallucinations. Calm and cooperative, interacting with peers and staff in dayroom..Did attend Group.

## 2018-02-09 NOTE — PROGRESS NOTES
The patient is being discharged today. He will follow up with [facility] for residential treatment on Monday, February 12 at 12:00. His brother and sister who live in [place] will sponsor his treatment. He was somewhat hesitant to begin the program at [facility], thinking it wasn't necessary, but he decided to have a a good attitude about it. He was a positive participant throughout admission. He will stay at home over the weekend, before admission at [facility], and he felt confident he would be able to stay sober. His family will provide transportation.

## 2018-02-09 NOTE — DISCHARGE SUMMARY
"      PSYCHIATRIC DISCHARGE SUMMARY     Patient Identification:  Name:  Gregor Fish  Age:  57 y.o.  Sex:  male  :  1960  MRN:  [id number]  Visit Number:  [id number]      Date of Admission:2018   Date of Discharge:  2018    Discharge Diagnosis:  Principal Problem:    Alcohol dependence with withdrawal  Active Problems:    MDD rec moderate    Coronary artery disease involving native coronary artery of native heart without angina pectoris    Essential hypertension    Mixed hyperlipidemia        Admission Diagnosis:  Alcohol abuse [F10.10]     Hospital Course  Patient is a 57 y.o. male presented with alcohol use and withdrawals. The patient was admitted to the [facility] detox recovery unit for safety, further evaluation and treatment.  The patient was started on Ativan detox and he was able to complete the detox treatment without any problems.  The patient reported taking Seroquel and Lexapro for depression. His Lexapro was changed to Zoloft to avoid drug interaction with Lexapro.  The patient was also able to take part in individual and group counseling sessions and work on appropriate coping skills.  The patient made steady improvement in his mood and expressed feeling more positive and hopeful about future. Sleep and appetite were improved.  The day of discharge the patient was calm, cooperative and pleasant. Mood was reported to be good, and denied SI/HI/AVH. Also reported no medication side effects.        Mental Status Exam upon discharge:   Mood \"good\"   Affect-congruent, appropriate, stable  Thought Content-goal directed, no delusional material present  Thought process-linear, organized.  Suicidality: No SI  Homicidality: No HI  Perception: No AH/VH    Procedures Performed         Consults:   Consults     No orders found from 2018 to 2018.          Pertinent Test Results: CBC, CMP, UA and UDS were unremarkable. Blood alcohol level at the time of presentation to the hospital was " 358.    Condition on Discharge:  improved    Vital Signs  Temp:  [97.6 °F (36.4 °C)-98.4 °F (36.9 °C)] 97.9 °F (36.6 °C)  Heart Rate:  [80-97] 84  Resp:  [16-18] 16  BP: (115-158)/() 158/100      Discharge Disposition:  Home or Self Care    Discharge Medications:   Gregor Fish   Home Medication Instructions BETTY:[id number]    Printed on:02/09/18 1041   Medication Information                      atorvastatin (LIPITOR) 80 MG tablet  Take 80 mg by mouth Every Night.             busPIRone (BUSPAR) 10 MG tablet  Take 10 mg by mouth 2 (Two) Times a Day.             omeprazole (priLOSEC) 20 MG capsule  Take 20 mg by mouth Daily.             QUEtiapine (SEROquel) 25 MG tablet  Take 25 mg by mouth Every Night.             sertraline (ZOLOFT) 25 MG tablet  Take 1 tablet by mouth Daily.                 Discharge Diet: Regular    Activity at Discharge: As tolerated    Follow-up Appointments  Future Appointments  Date Time Provider Department Center   3/13/2018 1:40 PM Veronica Li MD [facility] None   7/16/2018 10:45 AM Bert Negrete MD [facility] None         Test Results Pending at Discharge      Clinician:   Javier Daniel MD  02/09/18  10:41 AM

## 2018-02-23 ENCOUNTER — APPOINTMENT (OUTPATIENT)
Dept: SLEEP MEDICINE | Facility: HOSPITAL | Age: 58
End: 2018-02-23

## 2022-07-16 ENCOUNTER — HOSPITAL ENCOUNTER (OUTPATIENT)
Facility: HOSPITAL | Age: 62
Setting detail: OBSERVATION
Discharge: HOME | End: 2022-07-19
Attending: EMERGENCY MEDICINE | Admitting: FAMILY MEDICINE

## 2022-07-16 ENCOUNTER — APPOINTMENT (OUTPATIENT)
Dept: CT IMAGING | Facility: HOSPITAL | Age: 62
End: 2022-07-16

## 2022-07-16 ENCOUNTER — APPOINTMENT (OUTPATIENT)
Dept: GENERAL RADIOLOGY | Facility: HOSPITAL | Age: 62
End: 2022-07-16

## 2022-07-16 DIAGNOSIS — E87.6 HYPOKALEMIA: ICD-10-CM

## 2022-07-16 DIAGNOSIS — I95.1 ORTHOSTATIC HYPOTENSION: ICD-10-CM

## 2022-07-16 DIAGNOSIS — E86.0 DEHYDRATION: ICD-10-CM

## 2022-07-16 DIAGNOSIS — K52.9 COLITIS: Primary | ICD-10-CM

## 2022-07-16 DIAGNOSIS — E87.1 HYPONATREMIA: ICD-10-CM

## 2022-07-16 LAB
ALBUMIN SERPL-MCNC: 3 G/DL (ref 3.5–5.2)
ALBUMIN/GLOB SERPL: 0.9 G/DL
ALP SERPL-CCNC: 93 U/L (ref 39–117)
ALT SERPL W P-5'-P-CCNC: 76 U/L (ref 1–41)
ANION GAP SERPL CALCULATED.3IONS-SCNC: 18 MMOL/L (ref 5–15)
AST SERPL-CCNC: 170 U/L (ref 1–40)
BASOPHILS # BLD AUTO: 0.04 10*3/MM3 (ref 0–0.2)
BASOPHILS NFR BLD AUTO: 0.4 % (ref 0–1.5)
BILIRUB SERPL-MCNC: 1.4 MG/DL (ref 0–1.2)
BUN SERPL-MCNC: 4 MG/DL (ref 8–23)
BUN/CREAT SERPL: 5.6 (ref 7–25)
CALCIUM SPEC-SCNC: 8 MG/DL (ref 8.6–10.5)
CHLORIDE SERPL-SCNC: 89 MMOL/L (ref 98–107)
CO2 SERPL-SCNC: 20 MMOL/L (ref 22–29)
CREAT SERPL-MCNC: 0.71 MG/DL (ref 0.76–1.27)
D-LACTATE SERPL-SCNC: 1.9 MMOL/L (ref 0.5–2)
DEPRECATED RDW RBC AUTO: 47.7 FL (ref 37–54)
EGFRCR SERPLBLD CKD-EPI 2021: 104.4 ML/MIN/1.73
EOSINOPHIL # BLD AUTO: 0.09 10*3/MM3 (ref 0–0.4)
EOSINOPHIL NFR BLD AUTO: 0.8 % (ref 0.3–6.2)
ERYTHROCYTE [DISTWIDTH] IN BLOOD BY AUTOMATED COUNT: 14.3 % (ref 12.3–15.4)
ETHANOL BLD-MCNC: <10 MG/DL (ref 0–10)
ETHANOL UR QL: <0.01 %
GLOBULIN UR ELPH-MCNC: 3.5 GM/DL
GLUCOSE BLDC GLUCOMTR-MCNC: 139 MG/DL (ref 70–130)
GLUCOSE SERPL-MCNC: 139 MG/DL (ref 65–99)
HCT VFR BLD AUTO: 34.2 % (ref 37.5–51)
HGB BLD-MCNC: 12.7 G/DL (ref 13–17.7)
HOLD SPECIMEN: NORMAL
HOLD SPECIMEN: NORMAL
IMM GRANULOCYTES # BLD AUTO: 0.08 10*3/MM3 (ref 0–0.05)
IMM GRANULOCYTES NFR BLD AUTO: 0.8 % (ref 0–0.5)
LYMPHOCYTES # BLD AUTO: 1.7 10*3/MM3 (ref 0.7–3.1)
LYMPHOCYTES NFR BLD AUTO: 16.1 % (ref 19.6–45.3)
MAGNESIUM SERPL-MCNC: 1.5 MG/DL (ref 1.6–2.4)
MCH RBC QN AUTO: 34.2 PG (ref 26.6–33)
MCHC RBC AUTO-ENTMCNC: 37.1 G/DL (ref 31.5–35.7)
MCV RBC AUTO: 92.2 FL (ref 79–97)
MONOCYTES # BLD AUTO: 0.91 10*3/MM3 (ref 0.1–0.9)
MONOCYTES NFR BLD AUTO: 8.6 % (ref 5–12)
NEUTROPHILS NFR BLD AUTO: 7.77 10*3/MM3 (ref 1.7–7)
NEUTROPHILS NFR BLD AUTO: 73.3 % (ref 42.7–76)
NRBC BLD AUTO-RTO: 0.2 /100 WBC (ref 0–0.2)
PLATELET # BLD AUTO: 244 10*3/MM3 (ref 140–450)
PMV BLD AUTO: 10.7 FL (ref 6–12)
POTASSIUM SERPL-SCNC: 2.1 MMOL/L (ref 3.5–5.2)
PROCALCITONIN SERPL-MCNC: 0.3 NG/ML (ref 0–0.25)
PROT SERPL-MCNC: 6.5 G/DL (ref 6–8.5)
RBC # BLD AUTO: 3.71 10*6/MM3 (ref 4.14–5.8)
SODIUM SERPL-SCNC: 127 MMOL/L (ref 136–145)
TROPONIN T SERPL-MCNC: <0.01 NG/ML (ref 0–0.03)
WBC NRBC COR # BLD: 10.59 10*3/MM3 (ref 3.4–10.8)
WHOLE BLOOD HOLD COAG: NORMAL
WHOLE BLOOD HOLD SPECIMEN: NORMAL

## 2022-07-16 PROCEDURE — 25010000002 THIAMINE PER 100 MG: Performed by: EMERGENCY MEDICINE

## 2022-07-16 PROCEDURE — G0378 HOSPITAL OBSERVATION PER HR: HCPCS

## 2022-07-16 PROCEDURE — 71045 X-RAY EXAM CHEST 1 VIEW: CPT

## 2022-07-16 PROCEDURE — 74176 CT ABD & PELVIS W/O CONTRAST: CPT

## 2022-07-16 PROCEDURE — 84145 PROCALCITONIN (PCT): CPT | Performed by: EMERGENCY MEDICINE

## 2022-07-16 PROCEDURE — 85025 COMPLETE CBC W/AUTO DIFF WBC: CPT | Performed by: EMERGENCY MEDICINE

## 2022-07-16 PROCEDURE — 25010000002 POTASSIUM CHLORIDE PER 2 MEQ OF POTASSIUM: Performed by: EMERGENCY MEDICINE

## 2022-07-16 PROCEDURE — 83735 ASSAY OF MAGNESIUM: CPT | Performed by: EMERGENCY MEDICINE

## 2022-07-16 PROCEDURE — 99285 EMERGENCY DEPT VISIT HI MDM: CPT

## 2022-07-16 PROCEDURE — 71250 CT THORAX DX C-: CPT

## 2022-07-16 PROCEDURE — 83605 ASSAY OF LACTIC ACID: CPT | Performed by: EMERGENCY MEDICINE

## 2022-07-16 PROCEDURE — 82962 GLUCOSE BLOOD TEST: CPT

## 2022-07-16 PROCEDURE — 84484 ASSAY OF TROPONIN QUANT: CPT | Performed by: EMERGENCY MEDICINE

## 2022-07-16 PROCEDURE — 80053 COMPREHEN METABOLIC PANEL: CPT | Performed by: EMERGENCY MEDICINE

## 2022-07-16 PROCEDURE — 96365 THER/PROPH/DIAG IV INF INIT: CPT

## 2022-07-16 PROCEDURE — 82077 ASSAY SPEC XCP UR&BREATH IA: CPT | Performed by: EMERGENCY MEDICINE

## 2022-07-16 PROCEDURE — 99220 PR INITIAL OBSERVATION CARE/DAY 70 MINUTES: CPT | Performed by: STUDENT IN AN ORGANIZED HEALTH CARE EDUCATION/TRAINING PROGRAM

## 2022-07-16 RX ORDER — SODIUM CHLORIDE 0.9 % (FLUSH) 0.9 %
10 SYRINGE (ML) INJECTION AS NEEDED
Status: DISCONTINUED | OUTPATIENT
Start: 2022-07-16 | End: 2022-07-19 | Stop reason: HOSPADM

## 2022-07-16 RX ORDER — POTASSIUM CHLORIDE 750 MG/1
40 CAPSULE, EXTENDED RELEASE ORAL ONCE
Status: COMPLETED | OUTPATIENT
Start: 2022-07-16 | End: 2022-07-16

## 2022-07-16 RX ADMIN — POTASSIUM CHLORIDE 40 MEQ: 750 CAPSULE, EXTENDED RELEASE ORAL at 22:46

## 2022-07-16 RX ADMIN — THIAMINE HYDROCHLORIDE: 100 INJECTION, SOLUTION INTRAMUSCULAR; INTRAVENOUS at 23:05

## 2022-07-17 LAB
ADV 40+41 DNA STL QL NAA+NON-PROBE: NOT DETECTED
ANION GAP SERPL CALCULATED.3IONS-SCNC: 12.9 MMOL/L (ref 5–15)
ASTRO TYP 1-8 RNA STL QL NAA+NON-PROBE: NOT DETECTED
BASOPHILS # BLD AUTO: 0.02 10*3/MM3 (ref 0–0.2)
BASOPHILS NFR BLD AUTO: 0.2 % (ref 0–1.5)
BILIRUB UR QL STRIP: NEGATIVE
BUN SERPL-MCNC: 4 MG/DL (ref 8–23)
BUN/CREAT SERPL: 7.5 (ref 7–25)
C CAYETANENSIS DNA STL QL NAA+NON-PROBE: NOT DETECTED
C COLI+JEJ+UPSA DNA STL QL NAA+NON-PROBE: NOT DETECTED
C DIFF GDH STL QL: NEGATIVE
CALCIUM SPEC-SCNC: 7.7 MG/DL (ref 8.6–10.5)
CHLORIDE SERPL-SCNC: 100 MMOL/L (ref 98–107)
CLARITY UR: CLEAR
CO2 SERPL-SCNC: 18.1 MMOL/L (ref 22–29)
COLOR UR: YELLOW
CREAT SERPL-MCNC: 0.53 MG/DL (ref 0.76–1.27)
CRYPTOSP DNA STL QL NAA+NON-PROBE: NOT DETECTED
DEPRECATED RDW RBC AUTO: 51.2 FL (ref 37–54)
E HISTOLYT DNA STL QL NAA+NON-PROBE: NOT DETECTED
EAEC PAA PLAS AGGR+AATA ST NAA+NON-PRB: NOT DETECTED
EC STX1+STX2 GENES STL QL NAA+NON-PROBE: NOT DETECTED
EGFRCR SERPLBLD CKD-EPI 2021: 114 ML/MIN/1.73
EOSINOPHIL # BLD AUTO: 0.11 10*3/MM3 (ref 0–0.4)
EOSINOPHIL NFR BLD AUTO: 1.2 % (ref 0.3–6.2)
EPEC EAE GENE STL QL NAA+NON-PROBE: NOT DETECTED
ERYTHROCYTE [DISTWIDTH] IN BLOOD BY AUTOMATED COUNT: 14.6 % (ref 12.3–15.4)
ETEC LTA+ST1A+ST1B TOX ST NAA+NON-PROBE: NOT DETECTED
FLUAV RNA RESP QL NAA+PROBE: NOT DETECTED
FLUBV RNA RESP QL NAA+PROBE: NOT DETECTED
G LAMBLIA DNA STL QL NAA+NON-PROBE: NOT DETECTED
GLUCOSE SERPL-MCNC: 110 MG/DL (ref 65–99)
GLUCOSE UR STRIP-MCNC: NEGATIVE MG/DL
HCT VFR BLD AUTO: 30.7 % (ref 37.5–51)
HGB BLD-MCNC: 10.9 G/DL (ref 13–17.7)
HGB UR QL STRIP.AUTO: NEGATIVE
IMM GRANULOCYTES # BLD AUTO: 0.04 10*3/MM3 (ref 0–0.05)
IMM GRANULOCYTES NFR BLD AUTO: 0.4 % (ref 0–0.5)
KETONES UR QL STRIP: NEGATIVE
LEUKOCYTE ESTERASE UR QL STRIP.AUTO: NEGATIVE
LYMPHOCYTES # BLD AUTO: 1.41 10*3/MM3 (ref 0.7–3.1)
LYMPHOCYTES NFR BLD AUTO: 15.7 % (ref 19.6–45.3)
MCH RBC QN AUTO: 34.2 PG (ref 26.6–33)
MCHC RBC AUTO-ENTMCNC: 35.5 G/DL (ref 31.5–35.7)
MCV RBC AUTO: 96.2 FL (ref 79–97)
MONOCYTES # BLD AUTO: 0.79 10*3/MM3 (ref 0.1–0.9)
MONOCYTES NFR BLD AUTO: 8.8 % (ref 5–12)
NEUTROPHILS NFR BLD AUTO: 6.59 10*3/MM3 (ref 1.7–7)
NEUTROPHILS NFR BLD AUTO: 73.7 % (ref 42.7–76)
NITRITE UR QL STRIP: NEGATIVE
NOROVIRUS GI+II RNA STL QL NAA+NON-PROBE: NOT DETECTED
NRBC BLD AUTO-RTO: 0 /100 WBC (ref 0–0.2)
P SHIGELLOIDES DNA STL QL NAA+NON-PROBE: NOT DETECTED
PH UR STRIP.AUTO: 6.5 [PH] (ref 5–8)
PLATELET # BLD AUTO: 197 10*3/MM3 (ref 140–450)
PMV BLD AUTO: 10.5 FL (ref 6–12)
POTASSIUM SERPL-SCNC: 2.9 MMOL/L (ref 3.5–5.2)
PROT UR QL STRIP: NEGATIVE
RBC # BLD AUTO: 3.19 10*6/MM3 (ref 4.14–5.8)
RVA RNA STL QL NAA+NON-PROBE: NOT DETECTED
S ENT+BONG DNA STL QL NAA+NON-PROBE: NOT DETECTED
SAPO I+II+IV+V RNA STL QL NAA+NON-PROBE: NOT DETECTED
SARS-COV-2 RNA RESP QL NAA+PROBE: NOT DETECTED
SHIGELLA SP+EIEC IPAH ST NAA+NON-PROBE: NOT DETECTED
SODIUM SERPL-SCNC: 131 MMOL/L (ref 136–145)
SP GR UR STRIP: 1.01 (ref 1–1.03)
UROBILINOGEN UR QL STRIP: NORMAL
V CHOL+PARA+VUL DNA STL QL NAA+NON-PROBE: NOT DETECTED
V CHOLERAE DNA STL QL NAA+NON-PROBE: NOT DETECTED
WBC NRBC COR # BLD: 8.96 10*3/MM3 (ref 3.4–10.8)
Y ENTEROCOL DNA STL QL NAA+NON-PROBE: NOT DETECTED

## 2022-07-17 PROCEDURE — 87324 CLOSTRIDIUM AG IA: CPT | Performed by: NURSE PRACTITIONER

## 2022-07-17 PROCEDURE — 99225 PR SBSQ OBSERVATION CARE/DAY 25 MINUTES: CPT | Performed by: NURSE PRACTITIONER

## 2022-07-17 PROCEDURE — 25010000002 CEFTRIAXONE SODIUM-DEXTROSE 1-3.74 GM-%(50ML) RECONSTITUTED SOLUTION: Performed by: STUDENT IN AN ORGANIZED HEALTH CARE EDUCATION/TRAINING PROGRAM

## 2022-07-17 PROCEDURE — 96367 TX/PROPH/DG ADDL SEQ IV INF: CPT

## 2022-07-17 PROCEDURE — C9803 HOPD COVID-19 SPEC COLLECT: HCPCS

## 2022-07-17 PROCEDURE — 96366 THER/PROPH/DIAG IV INF ADDON: CPT

## 2022-07-17 PROCEDURE — 87507 IADNA-DNA/RNA PROBE TQ 12-25: CPT | Performed by: STUDENT IN AN ORGANIZED HEALTH CARE EDUCATION/TRAINING PROGRAM

## 2022-07-17 PROCEDURE — 87636 SARSCOV2 & INF A&B AMP PRB: CPT | Performed by: EMERGENCY MEDICINE

## 2022-07-17 PROCEDURE — 25010000002 ENOXAPARIN PER 10 MG: Performed by: STUDENT IN AN ORGANIZED HEALTH CARE EDUCATION/TRAINING PROGRAM

## 2022-07-17 PROCEDURE — 96368 THER/DIAG CONCURRENT INF: CPT

## 2022-07-17 PROCEDURE — 87040 BLOOD CULTURE FOR BACTERIA: CPT | Performed by: STUDENT IN AN ORGANIZED HEALTH CARE EDUCATION/TRAINING PROGRAM

## 2022-07-17 PROCEDURE — 81003 URINALYSIS AUTO W/O SCOPE: CPT | Performed by: EMERGENCY MEDICINE

## 2022-07-17 PROCEDURE — 80048 BASIC METABOLIC PNL TOTAL CA: CPT | Performed by: STUDENT IN AN ORGANIZED HEALTH CARE EDUCATION/TRAINING PROGRAM

## 2022-07-17 PROCEDURE — G0378 HOSPITAL OBSERVATION PER HR: HCPCS

## 2022-07-17 PROCEDURE — 25010000002 MAGNESIUM SULFATE 2 GM/50ML SOLUTION: Performed by: STUDENT IN AN ORGANIZED HEALTH CARE EDUCATION/TRAINING PROGRAM

## 2022-07-17 PROCEDURE — 85025 COMPLETE CBC W/AUTO DIFF WBC: CPT | Performed by: STUDENT IN AN ORGANIZED HEALTH CARE EDUCATION/TRAINING PROGRAM

## 2022-07-17 PROCEDURE — 96372 THER/PROPH/DIAG INJ SC/IM: CPT

## 2022-07-17 PROCEDURE — 87449 NOS EACH ORGANISM AG IA: CPT | Performed by: NURSE PRACTITIONER

## 2022-07-17 RX ORDER — ACETAMINOPHEN 160 MG/5ML
650 SOLUTION ORAL EVERY 4 HOURS PRN
Status: DISCONTINUED | OUTPATIENT
Start: 2022-07-17 | End: 2022-07-19 | Stop reason: HOSPADM

## 2022-07-17 RX ORDER — POTASSIUM CHLORIDE 7.45 MG/ML
10 INJECTION INTRAVENOUS
Status: DISCONTINUED | OUTPATIENT
Start: 2022-07-17 | End: 2022-07-19 | Stop reason: HOSPADM

## 2022-07-17 RX ORDER — SODIUM CHLORIDE 0.9 % (FLUSH) 0.9 %
10 SYRINGE (ML) INJECTION AS NEEDED
Status: DISCONTINUED | OUTPATIENT
Start: 2022-07-17 | End: 2022-07-19 | Stop reason: HOSPADM

## 2022-07-17 RX ORDER — QUETIAPINE FUMARATE 25 MG/1
25 TABLET, FILM COATED ORAL NIGHTLY
Status: DISCONTINUED | OUTPATIENT
Start: 2022-07-17 | End: 2022-07-17

## 2022-07-17 RX ORDER — SODIUM CHLORIDE, SODIUM LACTATE, POTASSIUM CHLORIDE, CALCIUM CHLORIDE 600; 310; 30; 20 MG/100ML; MG/100ML; MG/100ML; MG/100ML
125 INJECTION, SOLUTION INTRAVENOUS CONTINUOUS
Status: DISCONTINUED | OUTPATIENT
Start: 2022-07-17 | End: 2022-07-19 | Stop reason: HOSPADM

## 2022-07-17 RX ORDER — POTASSIUM CHLORIDE 750 MG/1
40 CAPSULE, EXTENDED RELEASE ORAL AS NEEDED
Status: DISCONTINUED | OUTPATIENT
Start: 2022-07-17 | End: 2022-07-19 | Stop reason: HOSPADM

## 2022-07-17 RX ORDER — MULTIPLE VITAMINS W/ MINERALS TAB 9MG-400MCG
1 TAB ORAL DAILY
Status: DISCONTINUED | OUTPATIENT
Start: 2022-07-18 | End: 2022-07-19 | Stop reason: HOSPADM

## 2022-07-17 RX ORDER — LORAZEPAM 2 MG/1
2 TABLET ORAL
Status: DISCONTINUED | OUTPATIENT
Start: 2022-07-17 | End: 2022-07-19 | Stop reason: HOSPADM

## 2022-07-17 RX ORDER — HYDROCODONE BITARTRATE AND ACETAMINOPHEN 5; 325 MG/1; MG/1
1 TABLET ORAL ONCE AS NEEDED
Status: COMPLETED | OUTPATIENT
Start: 2022-07-17 | End: 2022-07-17

## 2022-07-17 RX ORDER — FOLIC ACID 1 MG/1
1 TABLET ORAL DAILY
Status: DISCONTINUED | OUTPATIENT
Start: 2022-07-18 | End: 2022-07-19 | Stop reason: HOSPADM

## 2022-07-17 RX ORDER — LORAZEPAM 2 MG/ML
2 INJECTION INTRAMUSCULAR
Status: DISCONTINUED | OUTPATIENT
Start: 2022-07-17 | End: 2022-07-19 | Stop reason: HOSPADM

## 2022-07-17 RX ORDER — POTASSIUM CHLORIDE 1.5 G/1.77G
40 POWDER, FOR SOLUTION ORAL AS NEEDED
Status: DISCONTINUED | OUTPATIENT
Start: 2022-07-17 | End: 2022-07-19 | Stop reason: HOSPADM

## 2022-07-17 RX ORDER — POTASSIUM CHLORIDE 750 MG/1
40 CAPSULE, EXTENDED RELEASE ORAL EVERY 4 HOURS
Status: COMPLETED | OUTPATIENT
Start: 2022-07-17 | End: 2022-07-18

## 2022-07-17 RX ORDER — ENOXAPARIN SODIUM 100 MG/ML
40 INJECTION SUBCUTANEOUS DAILY
Status: DISCONTINUED | OUTPATIENT
Start: 2022-07-17 | End: 2022-07-19 | Stop reason: HOSPADM

## 2022-07-17 RX ORDER — NICOTINE 21 MG/24HR
1 PATCH, TRANSDERMAL 24 HOURS TRANSDERMAL EVERY 24 HOURS
Status: DISCONTINUED | OUTPATIENT
Start: 2022-07-17 | End: 2022-07-19 | Stop reason: HOSPADM

## 2022-07-17 RX ORDER — ACETAMINOPHEN 650 MG/1
650 SUPPOSITORY RECTAL EVERY 4 HOURS PRN
Status: DISCONTINUED | OUTPATIENT
Start: 2022-07-17 | End: 2022-07-19 | Stop reason: HOSPADM

## 2022-07-17 RX ORDER — POTASSIUM CHLORIDE 750 MG/1
40 CAPSULE, EXTENDED RELEASE ORAL EVERY 4 HOURS
Status: COMPLETED | OUTPATIENT
Start: 2022-07-17 | End: 2022-07-17

## 2022-07-17 RX ORDER — MAGNESIUM SULFATE HEPTAHYDRATE 40 MG/ML
4 INJECTION, SOLUTION INTRAVENOUS AS NEEDED
Status: DISCONTINUED | OUTPATIENT
Start: 2022-07-17 | End: 2022-07-19 | Stop reason: HOSPADM

## 2022-07-17 RX ORDER — ONDANSETRON 2 MG/ML
4 INJECTION INTRAMUSCULAR; INTRAVENOUS EVERY 6 HOURS PRN
Status: DISCONTINUED | OUTPATIENT
Start: 2022-07-17 | End: 2022-07-19 | Stop reason: HOSPADM

## 2022-07-17 RX ORDER — LORAZEPAM 2 MG/ML
1 INJECTION INTRAMUSCULAR
Status: DISCONTINUED | OUTPATIENT
Start: 2022-07-17 | End: 2022-07-19 | Stop reason: HOSPADM

## 2022-07-17 RX ORDER — BUSPIRONE HYDROCHLORIDE 10 MG/1
10 TABLET ORAL 2 TIMES DAILY
Status: DISCONTINUED | OUTPATIENT
Start: 2022-07-17 | End: 2022-07-19 | Stop reason: HOSPADM

## 2022-07-17 RX ORDER — PANTOPRAZOLE SODIUM 40 MG/1
40 TABLET, DELAYED RELEASE ORAL EVERY MORNING
Status: DISCONTINUED | OUTPATIENT
Start: 2022-07-17 | End: 2022-07-19 | Stop reason: HOSPADM

## 2022-07-17 RX ORDER — ACETAMINOPHEN 325 MG/1
650 TABLET ORAL EVERY 4 HOURS PRN
Status: DISCONTINUED | OUTPATIENT
Start: 2022-07-17 | End: 2022-07-19 | Stop reason: HOSPADM

## 2022-07-17 RX ORDER — MAGNESIUM SULFATE HEPTAHYDRATE 40 MG/ML
2 INJECTION, SOLUTION INTRAVENOUS AS NEEDED
Status: DISCONTINUED | OUTPATIENT
Start: 2022-07-17 | End: 2022-07-19 | Stop reason: HOSPADM

## 2022-07-17 RX ORDER — MAGNESIUM SULFATE HEPTAHYDRATE 40 MG/ML
6 INJECTION, SOLUTION INTRAVENOUS ONCE
Status: COMPLETED | OUTPATIENT
Start: 2022-07-17 | End: 2022-07-17

## 2022-07-17 RX ORDER — METRONIDAZOLE 500 MG/100ML
500 INJECTION, SOLUTION INTRAVENOUS EVERY 8 HOURS
Status: DISCONTINUED | OUTPATIENT
Start: 2022-07-17 | End: 2022-07-19 | Stop reason: HOSPADM

## 2022-07-17 RX ORDER — CEFTRIAXONE 1 G/50ML
1 INJECTION, SOLUTION INTRAVENOUS EVERY 24 HOURS
Status: DISCONTINUED | OUTPATIENT
Start: 2022-07-17 | End: 2022-07-19 | Stop reason: HOSPADM

## 2022-07-17 RX ORDER — LORAZEPAM 0.5 MG/1
1 TABLET ORAL
Status: DISCONTINUED | OUTPATIENT
Start: 2022-07-17 | End: 2022-07-19 | Stop reason: HOSPADM

## 2022-07-17 RX ORDER — SODIUM CHLORIDE 0.9 % (FLUSH) 0.9 %
10 SYRINGE (ML) INJECTION EVERY 12 HOURS SCHEDULED
Status: DISCONTINUED | OUTPATIENT
Start: 2022-07-17 | End: 2022-07-19 | Stop reason: HOSPADM

## 2022-07-17 RX ADMIN — ACETAMINOPHEN 650 MG: 325 TABLET ORAL at 10:27

## 2022-07-17 RX ADMIN — Medication 10 ML: at 02:11

## 2022-07-17 RX ADMIN — ENOXAPARIN SODIUM 40 MG: 40 INJECTION SUBCUTANEOUS at 02:10

## 2022-07-17 RX ADMIN — QUETIAPINE FUMARATE 25 MG: 25 TABLET ORAL at 02:10

## 2022-07-17 RX ADMIN — PANTOPRAZOLE SODIUM 40 MG: 40 TABLET, DELAYED RELEASE ORAL at 06:10

## 2022-07-17 RX ADMIN — CEFTRIAXONE 1 G: 1 INJECTION, SOLUTION INTRAVENOUS at 02:20

## 2022-07-17 RX ADMIN — SODIUM CHLORIDE, POTASSIUM CHLORIDE, SODIUM LACTATE AND CALCIUM CHLORIDE 1000 ML: 600; 310; 30; 20 INJECTION, SOLUTION INTRAVENOUS at 01:25

## 2022-07-17 RX ADMIN — SODIUM CHLORIDE, POTASSIUM CHLORIDE, SODIUM LACTATE AND CALCIUM CHLORIDE 125 ML/HR: 600; 310; 30; 20 INJECTION, SOLUTION INTRAVENOUS at 02:20

## 2022-07-17 RX ADMIN — Medication 1 PATCH: at 02:09

## 2022-07-17 RX ADMIN — MAGNESIUM SULFATE HEPTAHYDRATE 6 G: 40 INJECTION, SOLUTION INTRAVENOUS at 01:29

## 2022-07-17 RX ADMIN — Medication 10 ML: at 08:49

## 2022-07-17 RX ADMIN — SODIUM CHLORIDE, POTASSIUM CHLORIDE, SODIUM LACTATE AND CALCIUM CHLORIDE 125 ML/HR: 600; 310; 30; 20 INJECTION, SOLUTION INTRAVENOUS at 12:28

## 2022-07-17 RX ADMIN — BUSPIRONE HYDROCHLORIDE 10 MG: 10 TABLET ORAL at 20:59

## 2022-07-17 RX ADMIN — POTASSIUM CHLORIDE 40 MEQ: 750 CAPSULE, EXTENDED RELEASE ORAL at 02:10

## 2022-07-17 RX ADMIN — POTASSIUM CHLORIDE 40 MEQ: 750 CAPSULE, EXTENDED RELEASE ORAL at 20:58

## 2022-07-17 RX ADMIN — BUSPIRONE HYDROCHLORIDE 10 MG: 10 TABLET ORAL at 08:48

## 2022-07-17 RX ADMIN — METRONIDAZOLE 500 MG: 500 INJECTION, SOLUTION INTRAVENOUS at 03:44

## 2022-07-17 RX ADMIN — METRONIDAZOLE 500 MG: 500 INJECTION, SOLUTION INTRAVENOUS at 18:05

## 2022-07-17 RX ADMIN — METRONIDAZOLE 500 MG: 500 INJECTION, SOLUTION INTRAVENOUS at 10:44

## 2022-07-17 RX ADMIN — HYDROCODONE BITARTRATE AND ACETAMINOPHEN 1 TABLET: 5; 325 TABLET ORAL at 16:39

## 2022-07-17 RX ADMIN — SODIUM CHLORIDE, POTASSIUM CHLORIDE, SODIUM LACTATE AND CALCIUM CHLORIDE 125 ML/HR: 600; 310; 30; 20 INJECTION, SOLUTION INTRAVENOUS at 20:58

## 2022-07-17 RX ADMIN — QUETIAPINE FUMARATE 250 MG: 100 TABLET ORAL at 20:59

## 2022-07-17 RX ADMIN — SODIUM CHLORIDE 1000 ML: 9 INJECTION, SOLUTION INTRAVENOUS at 00:02

## 2022-07-17 RX ADMIN — POTASSIUM CHLORIDE 40 MEQ: 750 CAPSULE, EXTENDED RELEASE ORAL at 06:10

## 2022-07-17 RX ADMIN — Medication 10 ML: at 20:59

## 2022-07-17 NOTE — PROGRESS NOTES
[facility]    Name:  Gregor Fish   Age:  61 y.o.  Sex:  male  :  1960  MRN:  [id number]   Visit Number:  [id number]  Admission Date:  2022  Date Of Service:  22  Primary Care Physician:  Ana Lilia West APRN     LOS: 0 days :    Chief Complaint:      Nausea vomiting and diarrhea with generalized weakness    Subjective:    Patient seen and examined with no family at bedside.  He tells me that he lives with his fiancée.  Onset of symptoms 5 weeks ago states that he is lost approximately 20 pounds.  States that he drinks 3 days/week.  Does not feel as if his alcohol drinking is significant at this time.  Patient does not think he will withdrawal while hospitalized.  States feeling slightly improved this morning.    Hospital Course:    Patient is a 61-year-old male with past medical history of chronic alcohol abuse, COPD not on supplemental oxygen, CKD, hypertension, depression, and anxiety that presents to the hospital with complaints of generalized weakness/malaise, nausea, vomiting, and myalgias. Patient noted for the last week he has increasing weakness, myalgias, and fatigue. For the last few days he has been unable to tolerate much oral intake as he has been nauseous, vomiting, and diarrhea. Given worsening symptoms he presented to the ED.     Upon arrival to the ED, patient with a blood pressure of 98/83, pulse 110, temp 98.2, and oxygen saturation of 97% on room air. Labs revealed sodium 127, potassium 2.1, bicarb 20, chloride 89, anion gap 18. , ALT 76. Magnesium 1.5, procal 0.3. CT abdomen/pelvis without contrast revealed heterogeneous enhancement of liver; no convincing evidence of cirrhosis; inflammation of right side of colon consistent with acute colitis, small amount ascites. CT chest without contrast unremarkable. Patient was given 1L normal saline as well as banana bag and 40mEq potassium. Patient found to have significant  orthostatic hypotension following administration of IVF. Hospitalist then called for admission. GI panel with C diff negative. Given chronic alcohol use, [facility] protocol in place.    Review of Systems:     All systems were reviewed and negative except as mentioned in subjective, assessment and plan.    Vital Signs:    Temp:  [97.9 °F (36.6 °C)-98.7 °F (37.1 °C)] 97.9 °F (36.6 °C)  Heart Rate:  [] 81  Resp:  [16-20] 18  BP: ()/(50-83) 108/81    Intake and output:    I/O last 3 completed shifts:  In: -   Out: 500 [Urine:500]  I/O this shift:  In: 360 [P.O.:360]  Out: -     Physical Examination:    General Appearance:  Alert and cooperative. Chronically ill appearing.   Head:  Atraumatic and normocephalic.   Eyes: Conjunctivae and sclerae normal, no icterus. No pallor.   Throat: No oral lesions, no thrush, oral mucosa moist. Poor dentition noted.   Neck: Supple, trachea midline, no thyromegaly.   Lungs:   Breath sounds heard bilaterally equally.  No wheezing or crackles. No Pleural rub or bronchial breathing.   Heart:  Normal S1 and S2, no murmur, no gallop, no rub. No JVD.   Abdomen:   Normal bowel sounds, no masses, no organomegaly. Soft, nontender, nondistended, Generalized- mild tenderness noted   Extremities: Supple, no edema, no cyanosis, no clubbing.   Skin: No bleeding or rash.   Neurologic: Alert and oriented x 3. No facial asymmetry. Moves all four limbs. No tremors.      Laboratory results:    Results from last 7 days   Lab Units 07/17/22  0721 07/16/22  2102   SODIUM mmol/L 131* 127*   POTASSIUM mmol/L 2.9* 2.1*   CHLORIDE mmol/L 100 89*   CO2 mmol/L 18.1* 20.0*   BUN mg/dL 4* 4*   CREATININE mg/dL 0.53* 0.71*   CALCIUM mg/dL 7.7* 8.0*   BILIRUBIN mg/dL  --  1.4*   ALK PHOS U/L  --  93   ALT (SGPT) U/L  --  76*   AST (SGOT) U/L  --  170*   GLUCOSE mg/dL 110* 139*     Results from last 7 days   Lab Units 07/17/22  0721 07/16/22  2102   WBC 10*3/mm3 8.96 10.59   HEMOGLOBIN g/dL 10.9* 12.7*    HEMATOCRIT % 30.7* 34.2*   PLATELETS 10*3/mm3 197 244         Results from last 7 days   Lab Units 07/16/22 2102   TROPONIN T ng/mL <0.010             I have reviewed the patient's laboratory results.    Radiology results:    CT Abdomen Pelvis Without Contrast    Result Date: 7/16/2022  FINAL REPORT TECHNIQUE: Routine axial images through the abdomen and pelvis were obtained. CLINICAL HISTORY: nausea vomiting, weakness, elevated lft FINDINGS: Abdomen: No abnormality of the gallbladder is identified.  There is somewhat heterogeneous enhancement of the liver, but a discrete focal liver lesion is not seen.  There is no biliary ductal dilation.  The other solid abdominal organs and ureters are normal.  There is circumferential wall thickening involving the right side of the colon with pericolonic fat stranding, consistent with acute colitis.  The GI tract is otherwise unremarkable, with no sign of appendicitis.  Pelvis: The urinary bladder is normal.  There is a small amount of ascites adjacent to liver and spleen.  There is no acute osseous abnormality.     Impression: Heterogeneous enhancement of the liver.  No convincing evidence of cirrhosis.  Inflammation involving the right side of the colon, consistent with acute colitis.  Small amount of ascites. Authenticated and Electronically Signed by Joey Dos Santos M.D. on 07/16/2022 11:00:10 PM    CT Chest Without Contrast Diagnostic    Result Date: 7/16/2022  FINAL REPORT TECHNIQUE: Routine axial images were obtained from the lung apices to below the diaphragm without contrast. CLINICAL HISTORY: cough, dyspnea FINDINGS: The lungs are clear. The heart and vasculature are unremarkable. There is no pleural disease, adenopathy, or significant osseous abnormality.     Impression: Unremarkable. Authenticated and Electronically Signed by Joey Dos Santos M.D. on 07/16/2022 11:00:08 PM    XR Chest 1 View    Result Date: 7/17/2022  PROCEDURE: XR CHEST 1 VW-  HISTORY: Weak/Dizzy/AMS triage  protocol  COMPARISON: 07/19/2010.  FINDINGS: The heart is normal in size. The mediastinum is unremarkable. The lungs are clear. There is no pneumothorax.  There are no acute osseous abnormalities.      Impression: No acute cardiopulmonary process.  Continued followup is recommended.  This report was signed and finalized on 7/17/2022 7:26 AM by Toñito Gomez DO.    I have reviewed the patient's radiology reports.    Medication Review:     I have reviewed the patient's active and prn medications.     Problem List:      Colitis      Assessment:    1. Acute Right-Sided Colitis, POA  2. Intractable Nausea, Vomiting, Diarrhea secondary to Above, POA  3. Orthostatic Hypotension, POA  4. Dehydration  5. Hypokalemia, POA  6. Hypomagnesemia, POA  7. High Anion Gap Metabolic Acidosis  8. Hyponatremia, POA  9. Chronic Alcohol Abuse  10. Chronic Tobacco Use  11. Depression  12. Anxiety  13. Essential Hypertension  14. Hyperlipidemia     Plan:     Acute Right Sided Colitis  Intractable Nausea, Vomiting, Diarrhea  -Nausea, vomiting, diarrhea secondary to underlying acute colitis  -Continue IVF resuscitation with LR 125mL/hr  -Begin Rocephin/Flagyl  -GI panel/Cdiff negative  -Clear liquid diet for now, advance as tolerated  -prn antiemetics     Orthostatic Hypotension  Dehydration  -Continue with IVF resuscitation     Hypokalemia  Hypomagnesemia  -Electrolyte replacement protocol     Hyponatremia  -Likely secondary to hypovolemia. May have component of beer potomania  -Continue IVF resuscitation     Chronic Alcohol Abuse  -Continue multivitamins, thiamine, folate  -CIWA protocol ordered     Chronic Tobacco Use  -Nicotine patch offered     Depression  Anxiety  Hypertension  Hyperlipidemia  -Continue home medications as warranted    DVT Prophylaxis: Lovenox  Code Status: Full  Diet: Full liquid-AAT  Discharge Plan: 1-2 days    Anna Cody, APRN  07/17/22  14:27 EDT    Dictated utilizing Dragon dictation.

## 2022-07-17 NOTE — H&P
[facility]PITAL[facility]   HISTORY AND PHYSICAL      Name:  Gregor Fish   Age:  61 y.o.  Sex:  male  :  1960  MRN:  [id number]   Visit Number:  [id number]  Admission Date:  2022  Date Of Service:  22  Primary Care Physician:  Ana Lilia West APRN    Chief Complaint:     Generalized Weakness, Myalgias, Nausea, Vomiting, Diarrhea    History Of Presenting Illness:      Patient is a 61-year-old male with past medical history of chronic alcohol abuse, COPD not on supplemental oxygen, CKD, hypertension, depression, and anxiety that presents to the hospital with complaints of generalized weakness/malaise, nausea, vomiting, and myalgias. Patient states for the last week he has increasing weakness, myalgias, and fatigue. For the last few days he has been unable to tolerate much oral intake as he has been nauseous, vomiting, and diarrhea. Today, he felt significantly weak and dizzy upon standing prompting him to be seen in the ED for further evaluation.    Upon arrival to the ED, patient with a blood pressure of 98/83, pulse 110, temp 98.2, and oxygen saturation of 97% on room air. Labs revealed sodium 127, potassium 2.1, bicarb 20, chloride 89, anion gap 18. , ALT 76. Magnesium 1.5, procal 0.3. CT abdomen/pelvis without contrast revealed heterogeneous enhancement of liver; no convincing evidence of cirrhosis; inflammation of right side of colon consistent with acute colitis, small amount ascites. CT chest without contrast unremarkable. Patient was given 1L normal saline as well as banana bag and 40mEq potassium. Patient found to have significant orthostatic hypotension following administration of IVF. Hospitalist then called for admission.    Review Of Systems:    All systems were reviewed and negative except as mentioned in history of presenting illness, assessment and plan.    Past Medical History: Patient  has a past medical history of Alcohol abuse, Anxiety, Chronic kidney  disease, COPD (chronic obstructive pulmonary disease) (HCC), Depression, Hypertension, Myocardial infarction (HCC), and Withdrawal symptoms, alcohol (HCC).    Past Surgical History: Patient  has a past surgical history that includes Hernia repair.    Social History: Patient  reports that he has been smoking cigarettes. He has been smoking about 0.50 packs per day. He has never used smokeless tobacco. He reports current alcohol use. He reports that he does not use drugs.    Family History: Patient's family history includes Diabetes in his mother; Heart attack in his father; Hypertension in his mother; No Known Problems in his brother and sister.    Allergies:      Patient has no known allergies.    Home Medications:    Prior to Admission Medications     Prescriptions Last Dose Informant Patient Reported? Taking?    atorvastatin (LIPITOR) 80 MG tablet  Medication Bottle Yes No    Take 80 mg by mouth Every Night.    busPIRone (BUSPAR) 10 MG tablet  Medication Bottle Yes No    Take 10 mg by mouth 2 (Two) Times a Day.    LISINOPRIL PO   Yes No    Take  by mouth.    omeprazole (priLOSEC) 20 MG capsule  Medication Bottle Yes No    Take 20 mg by mouth Daily.    QUEtiapine (SEROquel) 25 MG tablet  Medication Bottle Yes No    Take 25 mg by mouth Every Night.    sertraline (ZOLOFT) 25 MG tablet   No No    Take 1 tablet by mouth Daily.        ED Medications:    Medications   sodium chloride 0.9 % flush 10 mL (has no administration in time range)   sodium chloride 0.9 % bolus 1,000 mL (has no administration in time range)   potassium chloride (MICRO-K) CR capsule 40 mEq (40 mEq Oral Given 7/16/22 2246)   sodium chloride 0.9 % 988.8 mL with potassium chloride 20 mEq, thiamine 100 mg, folic acid 1 mg infusion ( Intravenous New Bag 7/16/22 2305)     Vital Signs:  Temp:  [98.2 °F (36.8 °C)] 98.2 °F (36.8 °C)  Heart Rate:  [] 89  Resp:  [16] 16  BP: (67-99)/(50-83) 94/70    There were no vitals filed for this visit.  Body mass  "index is 20.1 kg/m².    Physical Exam:     Most recent vital Signs: BP 94/70   Pulse 89   Temp 98.2 °F (36.8 °C) (Oral)   Resp 16   Ht 172.7 cm (68\")   SpO2 92%   BMI 20.10 kg/m²     Physical Exam  Constitutional:       Appearance: He is ill-appearing.   HENT:      Mouth/Throat:      Mouth: Mucous membranes are dry.      Pharynx: Oropharynx is clear.   Eyes:      Extraocular Movements: Extraocular movements intact.      Pupils: Pupils are equal, round, and reactive to light.   Cardiovascular:      Rate and Rhythm: Normal rate and regular rhythm.      Pulses: Normal pulses.      Heart sounds: Normal heart sounds.   Pulmonary:      Effort: Pulmonary effort is normal.      Breath sounds: Normal breath sounds.   Abdominal:      General: Abdomen is flat.      Tenderness: There is abdominal tenderness.   Neurological:      General: No focal deficit present.      Mental Status: He is alert and oriented to person, place, and time. Mental status is at baseline.   Psychiatric:         Mood and Affect: Mood normal.         Behavior: Behavior normal.         Thought Content: Thought content normal.         Laboratory data:    I have reviewed the labs done in the emergency room.    Results from last 7 days   Lab Units 07/16/22  2102   SODIUM mmol/L 127*   POTASSIUM mmol/L 2.1*   CHLORIDE mmol/L 89*   CO2 mmol/L 20.0*   BUN mg/dL 4*   CREATININE mg/dL 0.71*   CALCIUM mg/dL 8.0*   BILIRUBIN mg/dL 1.4*   ALK PHOS U/L 93   ALT (SGPT) U/L 76*   AST (SGOT) U/L 170*   GLUCOSE mg/dL 139*     Results from last 7 days   Lab Units 07/16/22  2102   WBC 10*3/mm3 10.59   HEMOGLOBIN g/dL 12.7*   HEMATOCRIT % 34.2*   PLATELETS 10*3/mm3 244         Results from last 7 days   Lab Units 07/16/22  2102   TROPONIN T ng/mL <0.010                           Invalid input(s): USDES,  BLOODU, NITRITITE, BACT, EP    Pain Management Panel     Pain Management Panel Latest Ref Rng & Units 2/4/2018    AMPHETAMINES SCREEN, URINE Negative Negative    " BARBITURATES SCREEN Negative Negative    BENZODIAZEPINE SCREEN, URINE Negative Negative    BUPRENORPHINEUR Negative Negative    COCAINE SCREEN, URINE Negative Negative    METHADONE SCREEN, URINE Negative Negative          Radiology:    CT Abdomen Pelvis Without Contrast    Result Date: 7/16/2022  FINAL REPORT TECHNIQUE: Routine axial images through the abdomen and pelvis were obtained. CLINICAL HISTORY: nausea vomiting, weakness, elevated lft FINDINGS: Abdomen: No abnormality of the gallbladder is identified.  There is somewhat heterogeneous enhancement of the liver, but a discrete focal liver lesion is not seen.  There is no biliary ductal dilation.  The other solid abdominal organs and ureters are normal.  There is circumferential wall thickening involving the right side of the colon with pericolonic fat stranding, consistent with acute colitis.  The GI tract is otherwise unremarkable, with no sign of appendicitis.  Pelvis: The urinary bladder is normal.  There is a small amount of ascites adjacent to liver and spleen.  There is no acute osseous abnormality.     Heterogeneous enhancement of the liver.  No convincing evidence of cirrhosis.  Inflammation involving the right side of the colon, consistent with acute colitis.  Small amount of ascites. Authenticated and Electronically Signed by Jeoy Dos Santos M.D. on 07/16/2022 11:00:10 PM    CT Chest Without Contrast Diagnostic    Result Date: 7/16/2022  FINAL REPORT TECHNIQUE: Routine axial images were obtained from the lung apices to below the diaphragm without contrast. CLINICAL HISTORY: cough, dyspnea FINDINGS: The lungs are clear. The heart and vasculature are unremarkable. There is no pleural disease, adenopathy, or significant osseous abnormality.     Unremarkable. Authenticated and Electronically Signed by Joey Dos Santos M.D. on 07/16/2022 11:00:08 PM      Assessment:    1. Acute Right-Sided Colitis, POA  2. Intractable Nausea, Vomiting, Diarrhea secondary to Above,  POA  3. Orthostatic Hypotension, POA  4. Dehydration  5. Hypokalemia, POA  6. Hypomagnesemia, POA  7. High Anion Gap Metabolic Acidosis  8. Hyponatremia, POA  9. Chronic Alcohol Abuse  10. Chronic Tobacco Use  11. Depression  12. Anxiety  13. Essential Hypertension  14. Hyperlipidemia    Plan:    Acute Right Sided Colitis  Intractable Nausea, Vomiting, Diarrhea  -Nausea, vomiting, diarrhea secondary to underlying acute colitis  -Continue IVF resuscitation with LR 125mL/hr  -Begin Rocephin/Flagyl  -Obtain GI panel  -Clear liquid diet for now, advance as tolerated  -prn antiemetics    Orthostatic Hypotension  Dehydration  -Continue with IVF resuscitation    Hypokalemia  Hypomagnesemia  -Electrolyte replacement protocol    Hyponatremia  -Likely secondary to hypovolemia. May have component of beer potomania  -Continue IVF resuscitation    Chronic Alcohol Abuse  -Continue multivitamins, thiamine, folate  -CIWA protocol ordered    Chronic Tobacco Use  -Nicotine patch offered    Depression  Anxiety  Hypertension  Hyperlipidemia  -Continue home medications as warranted    Risk Assessment: High  DVT Prophylaxis: Lovenox  Code Status: Full  Diet: Clear liquid    Advance Care Planning   ACP discussion was held with the patient during this visit. Patient does not have an advance directive, declines further assistance.           Khushi Andersen DO  07/16/22  23:49 EDT    Dictated utilizing Dragon dictation.

## 2022-07-17 NOTE — PLAN OF CARE
Goal Outcome Evaluation:         No acute events to note. IV fluids and antibiotics administered per order. Labs collected and sent. VSS throughout the shift.         Problem: Adult Inpatient Plan of Care  Goal: Plan of Care Review  Outcome: Ongoing, Progressing  Goal: Patient-Specific Goal (Individualized)  Outcome: Ongoing, Progressing  Goal: Absence of Hospital-Acquired Illness or Injury  Outcome: Ongoing, Progressing  Intervention: Identify and Manage Fall Risk  Recent Flowsheet Documentation  Taken 7/17/2022 1400 by Taylor Gloria RN  Safety Promotion/Fall Prevention:   safety round/check completed   room organization consistent   nonskid shoes/slippers when out of bed   fall prevention program maintained   clutter free environment maintained   assistive device/personal items within reach   activity supervised  Intervention: Prevent Skin Injury  Recent Flowsheet Documentation  Taken 7/17/2022 1400 by Taylor Gloria RN  Body Position: position changed independently  Skin Protection:   adhesive use limited   transparent dressing maintained   skin-to-device areas padded  Intervention: Prevent and Manage VTE (Venous Thromboembolism) Risk  Recent Flowsheet Documentation  Taken 7/17/2022 1400 by Taylor Gloria RN  Activity Management: activity adjusted per tolerance  Intervention: Prevent Infection  Recent Flowsheet Documentation  Taken 7/17/2022 1400 by Taylor Gloria RN  Infection Prevention:   environmental surveillance performed   equipment surfaces disinfected   hand hygiene promoted   personal protective equipment utilized   rest/sleep promoted   single patient room provided   visitors restricted/screened  Goal: Optimal Comfort and Wellbeing  Outcome: Ongoing, Progressing  Goal: Readiness for Transition of Care  Outcome: Ongoing, Progressing     Problem: Skin Injury Risk Increased  Goal: Skin Health and Integrity  Outcome: Ongoing, Progressing  Intervention: Optimize Skin  Protection  Recent Flowsheet Documentation  Taken 7/17/2022 1400 by Taylor Gloria RN  Pressure Reduction Techniques: frequent weight shift encouraged  Head of Bed (HOB) Positioning: HOB at 30-45 degrees  Pressure Reduction Devices: pressure-redistributing mattress utilized  Skin Protection:   adhesive use limited   transparent dressing maintained   skin-to-device areas padded     Problem: COPD (Chronic Obstructive Pulmonary Disease) Comorbidity  Goal: Maintenance of COPD Symptom Control  Outcome: Ongoing, Progressing  Intervention: Maintain COPD-Symptom Control  Recent Flowsheet Documentation  Taken 7/17/2022 1400 by Taylor Gloria RN  Medication Review/Management: medications reviewed     Problem: Hypertension Comorbidity  Goal: Blood Pressure in Desired Range  Outcome: Ongoing, Progressing  Intervention: Maintain Blood Pressure Management  Recent Flowsheet Documentation  Taken 7/17/2022 1400 by Taylor Gloria RN  Medication Review/Management: medications reviewed     Problem: Fall Injury Risk  Goal: Absence of Fall and Fall-Related Injury  Outcome: Ongoing, Progressing  Intervention: Identify and Manage Contributors  Recent Flowsheet Documentation  Taken 7/17/2022 1400 by Taylor Gloria RN  Medication Review/Management: medications reviewed  Intervention: Promote Injury-Free Environment  Recent Flowsheet Documentation  Taken 7/17/2022 1400 by Taylor Gloria RN  Safety Promotion/Fall Prevention:   safety round/check completed   room organization consistent   nonskid shoes/slippers when out of bed   fall prevention program maintained   clutter free environment maintained   assistive device/personal items within reach   activity supervised     Problem: Pain Acute  Goal: Acceptable Pain Control and Functional Ability  Outcome: Ongoing, Progressing  Intervention: Prevent or Manage Pain  Recent Flowsheet Documentation  Taken 7/17/2022 1400 by Taylor Gloria RN  Medication  Review/Management: medications reviewed     Problem: Adjustment to Illness (Bowel Disease, Inflammatory)  Goal: Optimal Adaptation to Chronic Illness  Outcome: Ongoing, Progressing     Problem: Diarrhea (Bowel Disease, Inflammatory)  Goal: Diarrhea Symptom Relief  Outcome: Ongoing, Progressing     Problem: Infection (Bowel Disease, Inflammatory)  Goal: Absence of Infection Signs and Symptoms  Outcome: Ongoing, Progressing     Problem: Nutrition Impaired (Bowel Disease, Inflammatory)  Goal: Optimal Nutrition  Outcome: Ongoing, Progressing     Problem: Pain (Bowel Disease, Inflammatory)  Goal: Acceptable Pain Control  Outcome: Ongoing, Progressing

## 2022-07-17 NOTE — PLAN OF CARE
Goal Outcome Evaluation:            Patient admitted from ED. IV antibiotics and IV fluids administered per MD orders. WA protocol followed. Will continue to monitor.

## 2022-07-17 NOTE — CASE MANAGEMENT/SOCIAL WORK
Discharge Planning Assessment   [place]     Patient Name: Gregor Fish  MRN: [id number]  Today's Date: 7/17/2022    Admit Date: 7/16/2022     Discharge Needs Assessment     Row Name 07/17/22 1843       Living Environment    People in Home significant other    Name(s) of People in Home Pt lives with his brenden    Current Living Arrangements home    Duration at Residence 7 years    Primary Care Provided by self    Provides Primary Care For no one    Family Caregiver if Needed significant other    Able to Return to Prior Arrangements yes       Resource/Environmental Concerns    Resource/Environmental Concerns none       Transition Planning    Patient/Family Anticipates Transition to home    Transportation Anticipated family or friend will provide       Discharge Needs Assessment    Readmission Within the Last 30 Days no previous admission in last 30 days    Equipment Currently Used at Home none    Concerns to be Addressed no discharge needs identified    Equipment Needed After Discharge none               Discharge Plan     Row Name 07/17/22 1846       Plan    Plan Spoke with pt about DCP  Confirmed current address and phone contacts Dede Ortega, sister [phone] and Eloisa Cummings, sister [phone]  No POA or LW  Pt states he can do all ADL good when hes healthy without  problems and fair when hes ill    Pt states he has no financial issues and can get his meds, food etc without problems  Pt states he owns his home   DME none  Pt states he has never used HH services in the past   Will continue to assess pt for any further needs prior to being discharged              Continued Care and Services - Admitted Since 7/16/2022    Coordination has not been started for this encounter.       Expected Discharge Date and Time     Expected Discharge Date Expected Discharge Time    Jul 18, 2022          Demographic Summary     Row Name 07/17/22 1842       General Information    Admission Type observation    Arrived From  emergency department    Required Notices Provided Observation Status Notice    Referral Source admission list    Reason for Consult discharge planning    Preferred Language English       Contact Information    Permission Granted to Share Info With     Contact Information Obtained for                Functional Status     Row Name 07/17/22 1843       Functional Status, IADL    Medications independent    Meal Preparation independent    Housekeeping independent    Laundry independent    Shopping independent       Employment/    Employment Status disabled               Psychosocial    No documentation.                Abuse/Neglect    No documentation.                Legal    No documentation.                Substance Abuse    No documentation.                Patient Forms    No documentation.                   Mckenna Larry RN

## 2022-07-17 NOTE — ED PROVIDER NOTES
Subjective   61-year-old male presents to the ED with multiple complaints.  His initial complaint is pain everywhere.  Patient states that his entire body hurts from his shoulders all the way down to his toes.  He notes that he has not felt well for a few days to 1 month.  He has had a slight cough.  Cough is not productive sputum.  He has had some nausea and vomiting for the last few days.  He has had some generalized weakness and fatigue.  No chest pain.  No significant shortness of breath.  No abdominal pain.  No fever or chills.  No prior treatments or limiting factors.  No prior valuations.  No other complaints at this time.          Review of Systems   Constitutional: Positive for fatigue.   Respiratory: Negative for shortness of breath and wheezing.    Cardiovascular: Negative for chest pain and palpitations.   Gastrointestinal: Positive for nausea and vomiting. Negative for abdominal pain.   Musculoskeletal: Positive for myalgias.   Neurological: Positive for weakness.   All other systems reviewed and are negative.      Past Medical History:   Diagnosis Date   • Alcohol abuse    • Anxiety    • Chronic kidney disease    • COPD (chronic obstructive pulmonary disease) (HCC)    • Depression    • Hypertension    • Myocardial infarction (HCC)    • Withdrawal symptoms, alcohol (HCC)        No Known Allergies    Past Surgical History:   Procedure Laterality Date   • HERNIA REPAIR         Family History   Problem Relation Age of Onset   • Diabetes Mother    • Hypertension Mother    • Heart attack Father    • No Known Problems Sister    • No Known Problems Brother        Social History     Socioeconomic History   • Marital status: Single   Tobacco Use   • Smoking status: Current Every Day Smoker     Packs/day: 0.50     Types: Cigarettes   • Smokeless tobacco: Never Used   Substance and Sexual Activity   • Alcohol use: Yes     Comment: a double shot a day with one beer   • Drug use: No   • Sexual activity: Defer            Objective   Physical Exam  Vitals and nursing note reviewed.   Constitutional:       General: He is not in acute distress.     Appearance: He is well-developed. He is not diaphoretic.      Comments: Chronically ill-appearing.  Appears older than stated age.   HENT:      Head: Normocephalic and atraumatic.      Nose: Nose normal.   Eyes:      Conjunctiva/sclera: Conjunctivae normal.      Pupils: Pupils are equal, round, and reactive to light.   Cardiovascular:      Rate and Rhythm: Normal rate and regular rhythm.      Pulses: Normal pulses.   Pulmonary:      Effort: Pulmonary effort is normal. No respiratory distress.      Breath sounds: Normal breath sounds.   Abdominal:      General: There is no distension.      Palpations: Abdomen is soft.      Tenderness: There is no abdominal tenderness.   Musculoskeletal:         General: No deformity.   Neurological:      Mental Status: He is alert and oriented to person, place, and time.      Cranial Nerves: No cranial nerve deficit.      Coordination: Coordination normal.         Procedures           ED Course                                           MDM  61-year-old male presented to the ED with multiple complaints.  Fatigue weakness nausea vomiting.  Labs show some electrolyte abnormalities including hypokalemia and hyponatremia.  Likely related to some of his alcohol abuse and or the diarrhea recently.  Potassium was replaced intravenously.  He did receive a banana bag.  Patient continued to have significant orthostatic hypotension despite IV fluid rehydration.  CT was consistent with noncomplicated acute colitis.  Will be started on some antibiotics.  Given the orthostatic hypotension and multiple electrolyte abnormalities I discussed the case with hospitalist, patient will be admitted for further evaluation medical management.        Final diagnoses:   Colitis   Orthostatic hypotension   Hypokalemia   Hyponatremia   Dehydration       ED Disposition  ED  Disposition     ED Disposition   Decision to Admit    Condition   --    Comment   Level of Care: Telemetry [5]   Diagnosis: Colitis [657147]   Admitting Physician: AIDE JC [[id number]]   Attending Physician: AIDE JC [[id number]]               No follow-up provider specified.       Medication List      No changes were made to your prescriptions during this visit.          Del Engel, DO  07/17/22 0649

## 2022-07-18 LAB
ANION GAP SERPL CALCULATED.3IONS-SCNC: 9.7 MMOL/L (ref 5–15)
BUN SERPL-MCNC: 3 MG/DL (ref 8–23)
BUN/CREAT SERPL: 6.7 (ref 7–25)
CALCIUM SPEC-SCNC: 7.6 MG/DL (ref 8.6–10.5)
CHLORIDE SERPL-SCNC: 106 MMOL/L (ref 98–107)
CO2 SERPL-SCNC: 17.3 MMOL/L (ref 22–29)
CREAT SERPL-MCNC: 0.45 MG/DL (ref 0.76–1.27)
DEPRECATED RDW RBC AUTO: 52.2 FL (ref 37–54)
EGFRCR SERPLBLD CKD-EPI 2021: 119.8 ML/MIN/1.73
ERYTHROCYTE [DISTWIDTH] IN BLOOD BY AUTOMATED COUNT: 14.6 % (ref 12.3–15.4)
GLUCOSE SERPL-MCNC: 110 MG/DL (ref 65–99)
HCT VFR BLD AUTO: 27.4 % (ref 37.5–51)
HGB BLD-MCNC: 9.6 G/DL (ref 13–17.7)
MAGNESIUM SERPL-MCNC: 1.8 MG/DL (ref 1.6–2.4)
MCH RBC QN AUTO: 34.4 PG (ref 26.6–33)
MCHC RBC AUTO-ENTMCNC: 35 G/DL (ref 31.5–35.7)
MCV RBC AUTO: 98.2 FL (ref 79–97)
PLATELET # BLD AUTO: 195 10*3/MM3 (ref 140–450)
PMV BLD AUTO: 9.9 FL (ref 6–12)
POTASSIUM SERPL-SCNC: 3.7 MMOL/L (ref 3.5–5.2)
RBC # BLD AUTO: 2.79 10*6/MM3 (ref 4.14–5.8)
SODIUM SERPL-SCNC: 133 MMOL/L (ref 136–145)
WBC NRBC COR # BLD: 5.87 10*3/MM3 (ref 3.4–10.8)

## 2022-07-18 PROCEDURE — 25010000002 CEFTRIAXONE SODIUM-DEXTROSE 1-3.74 GM-%(50ML) RECONSTITUTED SOLUTION: Performed by: STUDENT IN AN ORGANIZED HEALTH CARE EDUCATION/TRAINING PROGRAM

## 2022-07-18 PROCEDURE — 85027 COMPLETE CBC AUTOMATED: CPT | Performed by: NURSE PRACTITIONER

## 2022-07-18 PROCEDURE — 99225 PR SBSQ OBSERVATION CARE/DAY 25 MINUTES: CPT | Performed by: NURSE PRACTITIONER

## 2022-07-18 PROCEDURE — 83735 ASSAY OF MAGNESIUM: CPT | Performed by: STUDENT IN AN ORGANIZED HEALTH CARE EDUCATION/TRAINING PROGRAM

## 2022-07-18 PROCEDURE — 97165 OT EVAL LOW COMPLEX 30 MIN: CPT

## 2022-07-18 PROCEDURE — 96366 THER/PROPH/DIAG IV INF ADDON: CPT

## 2022-07-18 PROCEDURE — 80048 BASIC METABOLIC PNL TOTAL CA: CPT | Performed by: NURSE PRACTITIONER

## 2022-07-18 PROCEDURE — 25010000002 ENOXAPARIN PER 10 MG: Performed by: STUDENT IN AN ORGANIZED HEALTH CARE EDUCATION/TRAINING PROGRAM

## 2022-07-18 PROCEDURE — 96372 THER/PROPH/DIAG INJ SC/IM: CPT

## 2022-07-18 PROCEDURE — G0378 HOSPITAL OBSERVATION PER HR: HCPCS

## 2022-07-18 PROCEDURE — 96367 TX/PROPH/DG ADDL SEQ IV INF: CPT

## 2022-07-18 PROCEDURE — 0 MAGNESIUM SULFATE 4 GM/100ML SOLUTION: Performed by: STUDENT IN AN ORGANIZED HEALTH CARE EDUCATION/TRAINING PROGRAM

## 2022-07-18 RX ORDER — MAGNESIUM SULFATE HEPTAHYDRATE 40 MG/ML
4 INJECTION, SOLUTION INTRAVENOUS ONCE
Status: COMPLETED | OUTPATIENT
Start: 2022-07-18 | End: 2022-07-18

## 2022-07-18 RX ADMIN — SODIUM CHLORIDE, POTASSIUM CHLORIDE, SODIUM LACTATE AND CALCIUM CHLORIDE 125 ML/HR: 600; 310; 30; 20 INJECTION, SOLUTION INTRAVENOUS at 14:30

## 2022-07-18 RX ADMIN — MULTIPLE VITAMINS W/ MINERALS TAB 1 TABLET: TAB at 08:53

## 2022-07-18 RX ADMIN — POTASSIUM CHLORIDE 40 MEQ: 750 CAPSULE, EXTENDED RELEASE ORAL at 03:32

## 2022-07-18 RX ADMIN — ENOXAPARIN SODIUM 40 MG: 40 INJECTION SUBCUTANEOUS at 08:53

## 2022-07-18 RX ADMIN — CEFTRIAXONE 1 G: 1 INJECTION, SOLUTION INTRAVENOUS at 01:23

## 2022-07-18 RX ADMIN — METRONIDAZOLE 500 MG: 500 INJECTION, SOLUTION INTRAVENOUS at 18:53

## 2022-07-18 RX ADMIN — PANTOPRAZOLE SODIUM 40 MG: 40 TABLET, DELAYED RELEASE ORAL at 06:32

## 2022-07-18 RX ADMIN — BUSPIRONE HYDROCHLORIDE 10 MG: 10 TABLET ORAL at 08:53

## 2022-07-18 RX ADMIN — Medication 1 PATCH: at 01:28

## 2022-07-18 RX ADMIN — BUSPIRONE HYDROCHLORIDE 10 MG: 10 TABLET ORAL at 20:46

## 2022-07-18 RX ADMIN — METRONIDAZOLE 500 MG: 500 INJECTION, SOLUTION INTRAVENOUS at 10:50

## 2022-07-18 RX ADMIN — POTASSIUM CHLORIDE 40 MEQ: 750 CAPSULE, EXTENDED RELEASE ORAL at 00:26

## 2022-07-18 RX ADMIN — THIAMINE HCL TAB 100 MG 100 MG: 100 TAB at 08:53

## 2022-07-18 RX ADMIN — FOLIC ACID 1 MG: 1 TABLET ORAL at 08:54

## 2022-07-18 RX ADMIN — Medication 10 ML: at 09:31

## 2022-07-18 RX ADMIN — SODIUM CHLORIDE, POTASSIUM CHLORIDE, SODIUM LACTATE AND CALCIUM CHLORIDE 125 ML/HR: 600; 310; 30; 20 INJECTION, SOLUTION INTRAVENOUS at 04:58

## 2022-07-18 RX ADMIN — ACETAMINOPHEN 650 MG: 325 TABLET ORAL at 01:55

## 2022-07-18 RX ADMIN — SERTRALINE 25 MG: 50 TABLET, FILM COATED ORAL at 20:46

## 2022-07-18 RX ADMIN — METRONIDAZOLE 500 MG: 500 INJECTION, SOLUTION INTRAVENOUS at 03:32

## 2022-07-18 RX ADMIN — QUETIAPINE FUMARATE 250 MG: 100 TABLET ORAL at 20:46

## 2022-07-18 RX ADMIN — MAGNESIUM SULFATE HEPTAHYDRATE 4 G: 40 INJECTION, SOLUTION INTRAVENOUS at 08:54

## 2022-07-18 NOTE — PROGRESS NOTES
[facility]    Name:  Gregor Fish   Age:  61 y.o.  Sex:  male  :  1960  MRN:  [id number]   Visit Number:  [id number]  Admission Date:  2022  Date Of Service:  22  Primary Care Physician:  Ana Lilia West APRN     LOS: 0 days :    Chief Complaint:      Nausea vomiting and diarrhea with generalized weakness    Subjective:    Patient seen and examined with no family at bedside.  He tells me that he lives with his fiancée.  Onset of symptoms 5 weeks ago states that he is lost approximately 20 pounds.  States that he drinks 3 days/week.  Does not feel as if his alcohol drinking is significant at this time.  Patient does not think he will withdrawal while hospitalized.  States feeling slightly improved this morning. Advised will likely go home tomorrow if able to advance diet today. Patient concerned stating that he may need some therapy at home as he feels overall weak. Advised will work with PT today to assess overall need. Diarrhea improved. Agreeable that he can tolerate soft/bland diet.    Hospital Course:    Patient is a 61-year-old male with past medical history of chronic alcohol abuse, COPD not on supplemental oxygen, CKD, hypertension, depression, and anxiety that presents to the hospital with complaints of generalized weakness/malaise, nausea, vomiting, and myalgias. Patient noted for the last week he has increasing weakness, myalgias, and fatigue. For the last few days he has been unable to tolerate much oral intake as he has been nauseous, vomiting, and diarrhea. Given worsening symptoms he presented to the ED.     Upon arrival to the ED, patient with a blood pressure of 98/83, pulse 110, temp 98.2, and oxygen saturation of 97% on room air. Labs revealed sodium 127, potassium 2.1, bicarb 20, chloride 89, anion gap 18. , ALT 76. Magnesium 1.5, procal 0.3. CT abdomen/pelvis without contrast revealed heterogeneous enhancement of liver; no  convincing evidence of cirrhosis; inflammation of right side of colon consistent with acute colitis, small amount ascites. CT chest without contrast unremarkable. Patient was given 1L normal saline as well as banana bag and 40mEq potassium. Patient found to have significant orthostatic hypotension following administration of IVF. Hospitalist then called for admission. GI panel with C diff negative. Given chronic alcohol use, CIWA protocol in place.  Continued on IV antibiotics with Rocephin and Flagyl.  Significant improvement in diarrhea noted.  PT/OT/case management consulted.    Review of Systems:     All systems were reviewed and negative except as mentioned in subjective, assessment and plan.    Vital Signs:    Temp:  [96.7 °F (35.9 °C)-98.5 °F (36.9 °C)] 96.7 °F (35.9 °C)  Heart Rate:  [] 81  Resp:  [18] 18  BP: (108-135)/(84-95) 135/95    Intake and output:    I/O last 3 completed shifts:  In: 6166.3 [P.O.:660; I.V.:5506.3]  Out: 975 [Urine:975]  No intake/output data recorded.    Physical Examination:    General Appearance:  Alert and cooperative. Chronically ill appearing.   Head:  Atraumatic and normocephalic.   Eyes: Conjunctivae and sclerae normal, no icterus. No pallor.   Throat: No oral lesions, no thrush, oral mucosa moist. Poor dentition noted.   Neck: Supple, trachea midline, no thyromegaly.   Lungs:   Breath sounds heard bilaterally equally.  No wheezing or crackles. No Pleural rub or bronchial breathing.   Heart:  Normal S1 and S2, no murmur, no gallop, no rub. No JVD.   Abdomen:   Normal bowel sounds, no masses, no organomegaly. Soft, nontender, nondistended, Generalized- mild tenderness noted   Extremities: Supple, no edema, no cyanosis, no clubbing.   Skin: No bleeding or rash.   Neurologic: Alert and oriented x 3. No facial asymmetry. Moves all four limbs. No tremors.      Laboratory results:    Results from last 7 days   Lab Units 07/18/22  0614 07/17/22  0721 07/16/22  2102   SODIUM  mmol/L 133* 131* 127*   POTASSIUM mmol/L 3.7 2.9* 2.1*   CHLORIDE mmol/L 106 100 89*   CO2 mmol/L 17.3* 18.1* 20.0*   BUN mg/dL 3* 4* 4*   CREATININE mg/dL 0.45* 0.53* 0.71*   CALCIUM mg/dL 7.6* 7.7* 8.0*   BILIRUBIN mg/dL  --   --  1.4*   ALK PHOS U/L  --   --  93   ALT (SGPT) U/L  --   --  76*   AST (SGOT) U/L  --   --  170*   GLUCOSE mg/dL 110* 110* 139*     Results from last 7 days   Lab Units 07/18/22  0614 07/17/22  0721 07/16/22  2102   WBC 10*3/mm3 5.87 8.96 10.59   HEMOGLOBIN g/dL 9.6* 10.9* 12.7*   HEMATOCRIT % 27.4* 30.7* 34.2*   PLATELETS 10*3/mm3 195 197 244         Results from last 7 days   Lab Units 07/16/22  2102   TROPONIN T ng/mL <0.010     Results from last 7 days   Lab Units 07/17/22  0208 07/17/22  0150   BLOODCX  No growth at 24 hours No growth at 24 hours         I have reviewed the patient's laboratory results.    Radiology results:    CT Abdomen Pelvis Without Contrast    Result Date: 7/16/2022  FINAL REPORT TECHNIQUE: Routine axial images through the abdomen and pelvis were obtained. CLINICAL HISTORY: nausea vomiting, weakness, elevated lft FINDINGS: Abdomen: No abnormality of the gallbladder is identified.  There is somewhat heterogeneous enhancement of the liver, but a discrete focal liver lesion is not seen.  There is no biliary ductal dilation.  The other solid abdominal organs and ureters are normal.  There is circumferential wall thickening involving the right side of the colon with pericolonic fat stranding, consistent with acute colitis.  The GI tract is otherwise unremarkable, with no sign of appendicitis.  Pelvis: The urinary bladder is normal.  There is a small amount of ascites adjacent to liver and spleen.  There is no acute osseous abnormality.     Impression: Heterogeneous enhancement of the liver.  No convincing evidence of cirrhosis.  Inflammation involving the right side of the colon, consistent with acute colitis.  Small amount of ascites. Authenticated and  Electronically Signed by Joey Dos Santos M.D. on 07/16/2022 11:00:10 PM    CT Chest Without Contrast Diagnostic    Result Date: 7/16/2022  FINAL REPORT TECHNIQUE: Routine axial images were obtained from the lung apices to below the diaphragm without contrast. CLINICAL HISTORY: cough, dyspnea FINDINGS: The lungs are clear. The heart and vasculature are unremarkable. There is no pleural disease, adenopathy, or significant osseous abnormality.     Impression: Unremarkable. Authenticated and Electronically Signed by Joey Dos Santos M.D. on 07/16/2022 11:00:08 PM    XR Chest 1 View    Result Date: 7/17/2022  PROCEDURE: XR CHEST 1 VW-  HISTORY: Weak/Dizzy/AMS triage protocol  COMPARISON: 07/19/2010.  FINDINGS: The heart is normal in size. The mediastinum is unremarkable. The lungs are clear. There is no pneumothorax.  There are no acute osseous abnormalities.      Impression: No acute cardiopulmonary process.  Continued followup is recommended.  This report was signed and finalized on 7/17/2022 7:26 AM by Toñito Gomez DO.    I have reviewed the patient's radiology reports.    Medication Review:     I have reviewed the patient's active and prn medications.     Problem List:      Colitis      Assessment:    1. Acute Right-Sided Colitis, POA  2. Intractable Nausea, Vomiting, Diarrhea secondary to Above, POA  3. Orthostatic Hypotension, POA  4. Dehydration  5. Hypokalemia, POA  6. Hypomagnesemia, POA  7. High Anion Gap Metabolic Acidosis  8. Hyponatremia, POA  9. Chronic Alcohol Abuse  10. Chronic Tobacco Use  11. Depression  12. Anxiety  13. Essential Hypertension  14. Hyperlipidemia     Plan:     Acute Right Sided Colitis  Intractable Nausea, Vomiting, Diarrhea  -Nausea, vomiting, diarrhea secondary to underlying acute colitis  -Continue IVF resuscitation with LR 125mL/hr- DC tomorrow likely  -Rocephin/Flagyl  -GI panel/Cdiff negative  -Clear liquid diet for now- advance to bland/soft today  -prn antiemetics  - Would recommend  outpatient follow up with GI for colonoscopy     Orthostatic Hypotension  Dehydration  -Continue with IVF resuscitation     Hypokalemia  Hypomagnesemia  -Electrolyte replacement protocol     Hyponatremia- Improved  -Likely secondary to hypovolemia. May have component of beer potomania  -Continue IVF resuscitation     Chronic Alcohol Abuse  -Continue multivitamins, thiamine, folate  -CIWA protocol ordered     Chronic Tobacco Use  -Nicotine patch offered     Depression  Anxiety  Hypertension  Hyperlipidemia  -Continue home medications as warranted    DVT Prophylaxis: Lovenox  Code Status: Full  Diet: Full liquid-AAT  Discharge Plan: likely within 24 hours    Anna Cody, APRN  07/18/22  11:48 EDT    Dictated utilizing Dragon dictation.

## 2022-07-18 NOTE — PLAN OF CARE
Goal Outcome Evaluation:           Progress: improving  Outcome Evaluation: VSS. Oxygenation maintained on room air.  Patient alert and oriented x4.  Complaint of pain controlled with ordered PRN medication (see MAR).  Intake and output monitored and documented.  IV fluids (see MAR) administered as ordered.  No acute events noted during shift.  Will continue to monitor patient.

## 2022-07-18 NOTE — PLAN OF CARE
Goal Outcome Evaluation:                 Problem: Adult Inpatient Plan of Care  Goal: Plan of Care Review  Outcome: Ongoing, Progressing  Goal: Patient-Specific Goal (Individualized)  Outcome: Ongoing, Progressing  Goal: Absence of Hospital-Acquired Illness or Injury  Outcome: Ongoing, Progressing  Intervention: Identify and Manage Fall Risk  Recent Flowsheet Documentation  Taken 7/18/2022 1200 by Taylor Gloria RN  Safety Promotion/Fall Prevention:   safety round/check completed   room organization consistent   fall prevention program maintained   clutter free environment maintained   assistive device/personal items within reach   activity supervised  Intervention: Prevent Skin Injury  Recent Flowsheet Documentation  Taken 7/18/2022 1200 by Taylor Gloria RN  Body Position: position changed independently  Intervention: Prevent and Manage VTE (Venous Thromboembolism) Risk  Recent Flowsheet Documentation  Taken 7/18/2022 1200 by Taylor Gloria RN  Activity Management: activity adjusted per tolerance  Intervention: Prevent Infection  Recent Flowsheet Documentation  Taken 7/18/2022 1200 by Taylor Gloria RN  Infection Prevention:   single patient room provided   rest/sleep promoted   personal protective equipment utilized   hand hygiene promoted   equipment surfaces disinfected   environmental surveillance performed  Goal: Optimal Comfort and Wellbeing  Outcome: Ongoing, Progressing  Goal: Readiness for Transition of Care  Outcome: Ongoing, Progressing     Problem: Skin Injury Risk Increased  Goal: Skin Health and Integrity  Outcome: Ongoing, Progressing  Intervention: Optimize Skin Protection  Recent Flowsheet Documentation  Taken 7/18/2022 1200 by Taylor Gloria RN  Head of Bed (HOB) Positioning: HOB at 30 degrees     Problem: COPD (Chronic Obstructive Pulmonary Disease) Comorbidity  Goal: Maintenance of COPD Symptom Control  Outcome: Ongoing, Progressing     Problem: Hypertension  Comorbidity  Goal: Blood Pressure in Desired Range  Outcome: Ongoing, Progressing     Problem: Fall Injury Risk  Goal: Absence of Fall and Fall-Related Injury  Outcome: Ongoing, Progressing  Intervention: Promote Injury-Free Environment  Recent Flowsheet Documentation  Taken 7/18/2022 1200 by aTylor Gloria RN  Safety Promotion/Fall Prevention:   safety round/check completed   room organization consistent   fall prevention program maintained   clutter free environment maintained   assistive device/personal items within reach   activity supervised     Problem: Pain Acute  Goal: Acceptable Pain Control and Functional Ability  Outcome: Ongoing, Progressing     Problem: Adjustment to Illness (Bowel Disease, Inflammatory)  Goal: Optimal Adaptation to Chronic Illness  Outcome: Ongoing, Progressing     Problem: Diarrhea (Bowel Disease, Inflammatory)  Goal: Diarrhea Symptom Relief  Outcome: Ongoing, Progressing     Problem: Infection (Bowel Disease, Inflammatory)  Goal: Absence of Infection Signs and Symptoms  Outcome: Ongoing, Progressing     Problem: Nutrition Impaired (Bowel Disease, Inflammatory)  Goal: Optimal Nutrition  Outcome: Ongoing, Progressing     Problem: Pain (Bowel Disease, Inflammatory)  Goal: Acceptable Pain Control  Outcome: Ongoing, Progressing

## 2022-07-18 NOTE — PLAN OF CARE
Goal Outcome Evaluation:  Plan of Care Reviewed With: patient        Progress: no change  Outcome Evaluation: OT eval completed. Patient presents deficits in coordination, strength, endurance, balance, mobility and ADL performance. Patient completed bed mobility, tf and functional mobility using RW x 36' with CGA. Patient requires min A for bathing, dressing and toileting tasks; is still having some diarrhea and requiring depend d/t occassional incontinence. Patient would benefit from a RW, SC and HH services at discharge, states he doesn't need/want HH as his fiance' will assist him at DC; informed patient HH services can work on strengthening and other things but he continues to state he doesn't want it. Patient is expected to benefit from continued OT services prior to DC.

## 2022-07-18 NOTE — CONSULTS
"Adult Nutrition  Assessment/PES    Patient Name:  Gregor Fish  YOB: 1960  MRN: [id number]  Admit Date:  7/16/2022    Assessment Date:  7/18/2022    Comments:    Pt w/ recent 20# wt loss and a MST score of 3. RD to conduct MSA to determine if malnutrition is present.     1. Continue current diet order as medically appropriate and tolerated.   2. Advance diet when medically appropriate and tolerated.   3. Encourage PO intake to meet 50% of estimated needs.   4. RD to order Boost Breeze TID to support increased estimated needs.   5. Monitor and replace electrolytes PRN.     RD to follow-up and available PRN.    Reason for Assessment     Row Name 07/18/22 0917          Reason for Assessment    Reason For Assessment per organizational policy;identified at risk by screening criteria;nurse/nurse practitioner consult     Diagnosis renal disease;pulmonary disease;substance use/abuse     Identified At Risk by Screening Criteria MST SCORE 2+;unintentional loss of 10 lbs or more in the past 2 mos                  Anthropometrics     Row Name 07/18/22 0920 07/18/22 0500       Anthropometrics    Height 172.7 cm (68\") --    Weight -- 75.8 kg (167 lb 1.7 oz)    Age for Calculations 61 --    Height for Calculation 1.727 m (5' 8\") --    Weight for Calculation 75.8 kg (167 lb) --               Labs/Tests/Procedures/Meds     Row Name 07/18/22 0918          Labs/Procedures/Meds    Lab Results Reviewed reviewed, pertinent     Lab Results Comments Low: Na, Cr, BUN, albumin  High: glucose, ALT            Medications    Pertinent Medications Reviewed reviewed, pertinent     Pertinent Medications Comments lovenox, folic acid, magnesium sulfate, MVI w/ minerals, protonix, thiamine                Physical Findings     Row Name 07/18/22 0919          Physical Findings    Overall Physical Appearance overwt, recent 20# wt loss, nausea, vomiting, and diarrhea                Estimated/Assessed Needs - Anthropometrics     Row Name " "07/18/22 0920 07/18/22 0500       Anthropometrics    Height 172.7 cm (68\") --    Weight -- 75.8 kg (167 lb 1.7 oz)    Age for Calculations 61 --    Height for Calculation 1.727 m (5' 8\") --    Weight for Calculation 75.8 kg (167 lb) --       Estimated/Assessed Needs    Additional Documentation Estimated Calorie Needs (Group);KCAL/KG (Group);Protein Requirements (Group);Fluid Requirements (Group) --       Estimated Calorie Needs    Estimated Calorie Requirement (kcal/day) 1743 --       KCAL/KG    KCAL/KG 35 Kcal/Kg (kcal);Kcal/KG (kcal) comment  23 kcal/kg --    35 Kcal/Kg (kcal) 2651.285 --    KCAL/KG (kcal) 1743 --       Protein Requirements    Weight Used For Protein Calculations 75.8 kg (167 lb) --    Est Protein Requirement Amount (gms/kg) 0.8 gm protein --    Estimated Protein Requirements (gms/day) 60.6 --       Fluid Requirements    Fluid Requirements (mL/day) 1743  1mL/kcal --    RDA Method (mL) 1743 --               Nutrition Prescription Ordered     Row Name 07/18/22 0920          Nutrition Prescription PO    Current PO Diet Clear Liquid                Evaluation of Received Nutrient/Fluid Intake     Row Name 07/18/22 0920          Intake Assessment    Energy/Calorie Requirement Assessment not meeting needs     Protein Requirement Assessment not meeting needs            PO Evaluation    Number of Days PO Intake Evaluated Insufficient Data            Vitals    Height 172.7 cm (68\")                     Problem/Interventions:   Problem 1     Row Name 07/18/22 0927          Nutrition Diagnoses Problem 1    Problem 1 Increased Nutrient Needs     Macronutrient Kcal     Etiology (related to) Medical Diagnosis     Renal CKD     Substance Use ETOH     Signs/Symptoms (evidenced by) Other (comment)  need for oral nutrition supplement to meet increased estimated needs.                      Intervention Goal     Row Name 07/18/22 0927          Intervention Goal    General Maintain nutrition;Improved nutrition related " lab(s);Meet nutritional needs for age/condition;Disease management/therapy     PO Establish PO;Advance diet;Maintain intake     Weight Maintain weight                Nutrition Intervention     Row Name 07/18/22 0928          Nutrition Intervention    RD/Tech Action Await begin PO;Encourage intake;Follow Tx progress;Care plan reviewd;Recommend/ordered     Recommended/Ordered Supplement                Nutrition Prescription     Row Name 07/18/22 0928          Nutrition Prescription PO    PO Prescription Begin/change supplement     Supplement Boost Breeze     Supplement Frequency 3 times a day     New PO Prescription Ordered? Yes            Other Orders    Obtain Weight Daily     Obtain Weight Ordered? No, recommended     Supplement Vitamin mineral supplement     Supplement Ordered? No, recommended     Labs Mg++;Na+;K+     Labs Ordered? No, recommended     Other Monitor and replace electrolytes PRN.                Education/Evaluation     Row Name 07/18/22 0929          Education    Education Will Instruct as appropriate            Monitor/Evaluation    Monitor Per protocol;PO intake;Supplement intake;Pertinent labs;Weight;Skin status;Symptoms                 Electronically signed by:  MARITZA MA RD  07/18/22 09:29 EDT

## 2022-07-18 NOTE — THERAPY EVALUATION
Patient Name: Gregor Fish  : 1960    MRN: [id number]                              Today's Date: 2022       Admit Date: 2022    Visit Dx:     ICD-10-CM ICD-9-CM   1. Colitis  K52.9 558.9   2. Orthostatic hypotension  I95.1 458.0   3. Hypokalemia  E87.6 276.8   4. Hyponatremia  E87.1 276.1   5. Dehydration  E86.0 276.51     Patient Active Problem List   Diagnosis   • Coronary artery disease involving native coronary artery of native heart without angina pectoris   • Essential hypertension   • Mixed hyperlipidemia   • Alcohol dependence with withdrawal (HCC)   • Colitis     Past Medical History:   Diagnosis Date   • Alcohol abuse    • Anxiety    • Chronic kidney disease    • COPD (chronic obstructive pulmonary disease) (HCC)    • Depression    • Hypertension    • Myocardial infarction (HCC)    • Withdrawal symptoms, alcohol (HCC)      Past Surgical History:   Procedure Laterality Date   • HERNIA REPAIR        General Information     Row Name 22 1256          OT Time and Intention    Document Type evaluation  -SD     Mode of Treatment occupational therapy  -SD     Row Name 22 1256          General Information    Patient Profile Reviewed yes  -SD     Prior Level of Function independent:;all household mobility  has a cane that he didn't use.  -SD     Existing Precautions/Restrictions fall  -SD     Barriers to Rehab medically complex  -SD     Row Name 22 1256          Living Environment    People in Home significant other  -SD     Row Name 22 1256          Home Main Entrance    Number of Stairs, Main Entrance six  -SD     Stair Railings, Main Entrance railings on both sides of stairs  -SD     Row Name 22 1256          Stairs Within Home, Primary    Number of Stairs, Within Home, Primary none  -SD     Row Name 22 1256          Cognition    Orientation Status (Cognition) oriented x 4  -SD     Row Name 22 1256          Safety Issues, Functional Mobility    Safety  Issues Affecting Function (Mobility) safety precautions follow-through/compliance;safety precaution awareness;awareness of need for assistance;positioning of assistive device;insight into deficits/self-awareness  -SD     Impairments Affecting Function (Mobility) balance;endurance/activity tolerance;strength;coordination  -SD           User Key  (r) = Recorded By, (t) = Taken By, (c) = Cosigned By    Initials Name Provider Type    SD Opal Flanagan OT Occupational Therapist                 Mobility/ADL's     Row Name 07/18/22 1258          Bed Mobility    Bed Mobility supine-sit  -SD     Supine-Sit [place] (Bed Mobility) contact guard  -SD     Assistive Device (Bed Mobility) bed rails;head of bed elevated  -[facility] Name 07/18/22 1258          Transfers    Transfers sit-stand transfer  -SD     Sit-Stand [place] (Transfers) contact guard  -SD     Row Name 07/18/22 1258          Sit-Stand Transfer    Assistive Device (Sit-Stand Transfers) walker, front-wheeled  -[facility] Name 07/18/22 1258          Functional Mobility    Functional Mobility- Ind. Level contact guard assist  -SD     Functional Mobility- Device walker, front-wheeled  -SD     Functional Mobility-Distance (Feet) 36  -SD     Functional Mobility- Safety Issues balance decreased during turns;sequencing ability decreased;step length decreased;weight-shifting ability decreased  -[facility] Name 07/18/22 1258          Activities of Daily Living    BADL Assessment/Intervention bathing;upper body dressing;lower body dressing;grooming;feeding;toileting  -[facility] Name 07/18/22 1258          Bathing Assessment/Intervention    [place] Level (Bathing) minimum assist (75% patient effort)  -[facility] Name 07/18/22 1258          Upper Body Dressing Assessment/Training    [place] Level (Upper Body Dressing) set up  -SD     Row Name 07/18/22 1258          Lower Body Dressing Assessment/Training    [place] Level (Lower Body Dressing)  socks;pants/bottoms;minimum assist (75% patient effort)  -SD     [facility] Name 07/18/22 1258          Grooming Assessment/Training    [place] Level (Grooming) standby assist  -SD     Row Name 07/18/22 1258          Self-Feeding Assessment/Training    [place] Level (Feeding) set up  -SD     Row Name 07/18/22 1258          Toileting Assessment/Training    [place] Level (Toileting) change pad/brief;perform perineal hygiene;adjust/manage clothing;minimum assist (75% patient effort)  -SD     Assistive Devices (Toileting) commode, bedside without drop arms  -SD           User Key  (r) = Recorded By, (t) = Taken By, (c) = Cosigned By    Initials Name Provider Type    SD Opal Flanagan OT Occupational Therapist               Obj/Interventions     Row Name 07/18/22 1259          Range of Motion Comprehensive    General Range of Motion bilateral upper extremity ROM WFL  -SD     Row Name 07/18/22 1259          Strength Comprehensive (MMT)    General Manual Muscle Testing (MMT) Assessment upper extremity strength deficits identified  -SD     Comment, General Manual Muscle Testing (MMT) Assessment UB 3+/5 - 4-/5  -SD           User Key  (r) = Recorded By, (t) = Taken By, (c) = Cosigned By    Initials Name Provider Type    SD Opal Flanagan OT Occupational Therapist               Goals/Plan     Row Name 07/18/22 1305          Transfer Goal 1 (OT)    Activity/Assistive Device (Transfer Goal 1, OT) sit-to-stand/stand-to-sit;walker, rolling  -SD     [place] Level/Cues Needed (Transfer Goal 1, OT) standby assist  -SD     Time Frame (Transfer Goal 1, OT) long term goal (LTG)  -SD     Progress/Outcome (Transfer Goal 1, OT) goal ongoing  -SD     Row Name 07/18/22 1305          Bathing Goal 1 (OT)    Activity/Device (Bathing Goal 1, OT) bathing skills, all  -SD     [place] Level/Cues Needed (Bathing Goal 1, OT) standby assist  -SD     Time Frame (Bathing Goal 1, OT) 2 weeks  -SD     Progress/Outcomes (Bathing  Goal 1, OT) goal ongoing  -SD     Row Name 07/18/22 1305          Dressing Goal 1 (OT)    Activity/Device (Dressing Goal 1, OT) lower body dressing  -SD     [place]/Cues Needed (Dressing Goal 1, OT) standby assist  -SD     Time Frame (Dressing Goal 1, OT) 2 weeks  -SD     Progress/Outcome (Dressing Goal 1, OT) goal ongoing  -SD     Row Name 07/18/22 1305          Toileting Goal 1 (OT)    Activity/Device (Toileting Goal 1, OT) toileting skills, all;commode  -SD     [place] Level/Cues Needed (Toileting Goal 1, OT) standby assist  -SD     Time Frame (Toileting Goal 1, OT) 2 weeks  -SD     Progress/Outcome (Toileting Goal 1, OT) goal ongoing  -SD     [facility] Name 07/18/22 1305          Strength Goal 1 (OT)    Strength Goal 1 (OT) Patient to perform UB ther ex as tolerated  -SD     Time Frame (Strength Goal 1, OT) long term goal (LTG)  -SD     Progress/Outcome (Strength Goal 1, OT) goal ongoing  -SD     Row Name 07/18/22 1305          Therapy Assessment/Plan (OT)    Planned Therapy Interventions (OT) activity tolerance training;adaptive equipment training;BADL retraining;patient/caregiver education/training;ROM/therapeutic exercise;strengthening exercise;transfer/mobility retraining  -SD           User Key  (r) = Recorded By, (t) = Taken By, (c) = Cosigned By    Initials Name Provider Type    Opal Prasad OT Occupational Therapist               Clinical Impression     Row Name 07/18/22 1259          Pain Assessment    Pretreatment Pain Rating 0/10 - no pain  -SD     Posttreatment Pain Rating 0/10 - no pain  -SD     Row Name 07/18/22 1259          Plan of Care Review    Plan of Care Reviewed With patient  -SD     Progress no change  -SD     Outcome Evaluation OT eval completed. Patient presents deficits in coordination, strength, endurance, balance, mobility and ADL performance. Patient completed bed mobility, tf and functional mobility using RW x 36' with CGA. Patient requires min A for bathing, dressing  and toileting tasks; is still having some diarrhea and requiring depend d/t occassional incontinence. Patient would benefit from a RW, SC and HH services at discharge, states he doesn't need/want HH as his fiance' will assist him at DC; informed patient HH services can work on strengthening and other things but he continues to state he doesn't want it. Patient is expected to benefit from continued OT services prior to DC.  -SD     Row Name 07/18/22 3468          Therapy Assessment/Plan (OT)    Patient/Family Therapy Goal Statement (OT) return home with fiance assisting him  -SD     Rehab Potential (OT) good, to achieve stated therapy goals  -SD     Criteria for Skilled Therapeutic Interventions Met (OT) skilled treatment is necessary  -SD     Therapy Frequency (OT) 3 times/wk  5 times if indicated  -SD     Row Name 07/18/22 9575          Therapy Plan Review/Discharge Plan (OT)    Equipment Needs Upon Discharge (OT) walker, rolling;shower chair  -SD     Anticipated Discharge Disposition (OT) home with home health;home with assist  -SD     Row Name 07/18/22 1251          Vital Signs    O2 Delivery Pre Treatment room air  -SD     O2 Delivery Intra Treatment room air  -SD     O2 Delivery Post Treatment room air  -SD     Row Name 07/18/22 1258          Positioning and Restraints    Pre-Treatment Position in bed  -SD     Post Treatment Position chair  -SD     In Chair sitting;call light within reach;encouraged to call for assist;notified nsg  -SD           User Key  (r) = Recorded By, (t) = Taken By, (c) = Cosigned By    Initials Name Provider Type    Opal Prasad OT Occupational Therapist               Outcome Measures     Row Name 07/18/22 7199          How much help from another is currently needed...    Putting on and taking off regular lower body clothing? 3  -SD     Bathing (including washing, rinsing, and drying) 3  -SD     Toileting (which includes using toilet bed pan or urinal) 3  -SD     Putting on and  taking off regular upper body clothing 3  -SD     Taking care of personal grooming (such as brushing teeth) 3  -SD     Eating meals 3  -SD     AM-PAC 6 Clicks Score (OT) 18  -SD     Row Name 07/18/22 1306          Functional Assessment    Outcome Measure Options AM-PAC 6 Clicks Daily Activity (OT)  -SD           User Key  (r) = Recorded By, (t) = Taken By, (c) = Cosigned By    Initials Name Provider Type    SD Opal Flanagan OT Occupational Therapist                Occupational Therapy Education                 Title: PT OT SLP Therapies (In Progress)     Topic: Occupational Therapy (In Progress)     Point: ADL training (Done)     Description:   Instruct learner(s) on proper safety adaptation and remediation techniques during self care or transfers.   Instruct in proper use of assistive devices.              Learning Progress Summary           Patient Acceptance, E,TB, VU by SD at 7/18/2022 1308    Comment: Benefit of OT; OT POC                   Point: Home exercise program (Not Started)     Description:   Instruct learner(s) on appropriate technique for monitoring, assisting and/or progressing therapeutic exercises/activities.              Learner Progress:  Not documented in this visit.          Point: Precautions (Not Started)     Description:   Instruct learner(s) on prescribed precautions during self-care and functional transfers.              Learner Progress:  Not documented in this visit.          Point: Body mechanics (Not Started)     Description:   Instruct learner(s) on proper positioning and spine alignment during self-care, functional mobility activities and/or exercises.              Learner Progress:  Not documented in this visit.                      User Key     Initials Effective Dates Name Provider Type Discipline    SD 06/16/21 -  Opal Flanagan OT Occupational Therapist OT              OT Recommendation and Plan  Planned Therapy Interventions (OT): activity tolerance training, adaptive  equipment training, BADL retraining, patient/caregiver education/training, ROM/therapeutic exercise, strengthening exercise, transfer/mobility retraining  Therapy Frequency (OT): 3 times/wk (5 times if indicated)  Plan of Care Review  Plan of Care Reviewed With: patient  Progress: no change  Outcome Evaluation: OT eval completed. Patient presents deficits in coordination, strength, endurance, balance, mobility and ADL performance. Patient completed bed mobility, tf and functional mobility using RW x 36' with CGA. Patient requires min A for bathing, dressing and toileting tasks; is still having some diarrhea and requiring depend d/t occassional incontinence. Patient would benefit from a RW, SC and HH services at discharge, states he doesn't need/want HH as his fiance' will assist him at DC; informed patient HH services can work on strengthening and other things but he continues to state he doesn't want it. Patient is expected to benefit from continued OT services prior to DC.     Time Calculation:    Time Calculation- OT     Row Name 07/18/22 1309             Time Calculation- OT    OT Start Time 1233  -SD      OT Received On 07/18/22  -SD      OT Goal Re-Cert Due Date 07/28/22  -SD              Untimed Charges    OT Eval/Re-eval Minutes 45  -SD              Total Minutes    Untimed Charges Total Minutes 45  -SD       Total Minutes 45  -SD            User Key  (r) = Recorded By, (t) = Taken By, (c) = Cosigned By    Initials Name Provider Type    Opal Prasad OT Occupational Therapist              Therapy Charges for Today     Code Description Service Date Service Provider Modifiers Qty    [id number]  OT EVAL LOW COMPLEXITY 3 7/18/2022 Opal Flanagan OT GO 1               Opal Flanagan OT  7/18/2022

## 2022-07-19 VITALS
OXYGEN SATURATION: 96 % | HEIGHT: 68 IN | DIASTOLIC BLOOD PRESSURE: 78 MMHG | BODY MASS INDEX: 25.39 KG/M2 | HEART RATE: 89 BPM | WEIGHT: 167.55 LBS | SYSTOLIC BLOOD PRESSURE: 113 MMHG | RESPIRATION RATE: 18 BRPM | TEMPERATURE: 98.6 F

## 2022-07-19 PROBLEM — Z72.0 TOBACCO ABUSE: Status: ACTIVE | Noted: 2022-07-19

## 2022-07-19 LAB
ANION GAP SERPL CALCULATED.3IONS-SCNC: 11.8 MMOL/L (ref 5–15)
BUN SERPL-MCNC: 2 MG/DL (ref 8–23)
BUN/CREAT SERPL: 4.2 (ref 7–25)
CALCIUM SPEC-SCNC: 7.7 MG/DL (ref 8.6–10.5)
CHLORIDE SERPL-SCNC: 101 MMOL/L (ref 98–107)
CO2 SERPL-SCNC: 17.2 MMOL/L (ref 22–29)
CREAT SERPL-MCNC: 0.48 MG/DL (ref 0.76–1.27)
EGFRCR SERPLBLD CKD-EPI 2021: 117.5 ML/MIN/1.73
GLUCOSE SERPL-MCNC: 137 MG/DL (ref 65–99)
MAGNESIUM SERPL-MCNC: 1.6 MG/DL (ref 1.6–2.4)
POTASSIUM SERPL-SCNC: 3.2 MMOL/L (ref 3.5–5.2)
SODIUM SERPL-SCNC: 130 MMOL/L (ref 136–145)

## 2022-07-19 PROCEDURE — 97535 SELF CARE MNGMENT TRAINING: CPT

## 2022-07-19 PROCEDURE — 96372 THER/PROPH/DIAG INJ SC/IM: CPT

## 2022-07-19 PROCEDURE — G0378 HOSPITAL OBSERVATION PER HR: HCPCS

## 2022-07-19 PROCEDURE — 83735 ASSAY OF MAGNESIUM: CPT | Performed by: STUDENT IN AN ORGANIZED HEALTH CARE EDUCATION/TRAINING PROGRAM

## 2022-07-19 PROCEDURE — 96366 THER/PROPH/DIAG IV INF ADDON: CPT

## 2022-07-19 PROCEDURE — 97110 THERAPEUTIC EXERCISES: CPT

## 2022-07-19 PROCEDURE — 0 MAGNESIUM SULFATE 4 GM/100ML SOLUTION: Performed by: STUDENT IN AN ORGANIZED HEALTH CARE EDUCATION/TRAINING PROGRAM

## 2022-07-19 PROCEDURE — 80048 BASIC METABOLIC PNL TOTAL CA: CPT | Performed by: NURSE PRACTITIONER

## 2022-07-19 PROCEDURE — 99217 PR OBSERVATION CARE DISCHARGE MANAGEMENT: CPT | Performed by: NURSE PRACTITIONER

## 2022-07-19 PROCEDURE — 25010000002 CEFTRIAXONE SODIUM-DEXTROSE 1-3.74 GM-%(50ML) RECONSTITUTED SOLUTION: Performed by: STUDENT IN AN ORGANIZED HEALTH CARE EDUCATION/TRAINING PROGRAM

## 2022-07-19 PROCEDURE — 97161 PT EVAL LOW COMPLEX 20 MIN: CPT

## 2022-07-19 PROCEDURE — 25010000002 ENOXAPARIN PER 10 MG: Performed by: STUDENT IN AN ORGANIZED HEALTH CARE EDUCATION/TRAINING PROGRAM

## 2022-07-19 RX ORDER — MAGNESIUM SULFATE HEPTAHYDRATE 40 MG/ML
4 INJECTION, SOLUTION INTRAVENOUS ONCE
Status: COMPLETED | OUTPATIENT
Start: 2022-07-19 | End: 2022-07-19

## 2022-07-19 RX ORDER — LEVOFLOXACIN 500 MG/1
500 TABLET, FILM COATED ORAL DAILY
Qty: 2 TABLET | Refills: 0 | Status: SHIPPED | OUTPATIENT
Start: 2022-07-20 | End: 2022-07-19 | Stop reason: SDUPTHER

## 2022-07-19 RX ORDER — LEVOFLOXACIN 500 MG/1
500 TABLET, FILM COATED ORAL DAILY
Qty: 2 TABLET | Refills: 0 | Status: SHIPPED | OUTPATIENT
Start: 2022-07-20 | End: 2022-07-22

## 2022-07-19 RX ORDER — POTASSIUM CHLORIDE 750 MG/1
40 CAPSULE, EXTENDED RELEASE ORAL EVERY 4 HOURS
Status: COMPLETED | OUTPATIENT
Start: 2022-07-19 | End: 2022-07-19

## 2022-07-19 RX ADMIN — THIAMINE HCL TAB 100 MG 100 MG: 100 TAB at 09:07

## 2022-07-19 RX ADMIN — FOLIC ACID 1 MG: 1 TABLET ORAL at 09:07

## 2022-07-19 RX ADMIN — BUSPIRONE HYDROCHLORIDE 10 MG: 10 TABLET ORAL at 09:07

## 2022-07-19 RX ADMIN — ENOXAPARIN SODIUM 40 MG: 40 INJECTION SUBCUTANEOUS at 09:07

## 2022-07-19 RX ADMIN — SERTRALINE 25 MG: 50 TABLET, FILM COATED ORAL at 09:07

## 2022-07-19 RX ADMIN — POTASSIUM CHLORIDE 40 MEQ: 750 CAPSULE, EXTENDED RELEASE ORAL at 09:00

## 2022-07-19 RX ADMIN — POTASSIUM CHLORIDE 40 MEQ: 750 CAPSULE, EXTENDED RELEASE ORAL at 11:59

## 2022-07-19 RX ADMIN — CEFTRIAXONE 1 G: 1 INJECTION, SOLUTION INTRAVENOUS at 01:05

## 2022-07-19 RX ADMIN — PANTOPRAZOLE SODIUM 40 MG: 40 TABLET, DELAYED RELEASE ORAL at 06:47

## 2022-07-19 RX ADMIN — Medication 10 ML: at 09:07

## 2022-07-19 RX ADMIN — MULTIPLE VITAMINS W/ MINERALS TAB 1 TABLET: TAB at 09:07

## 2022-07-19 RX ADMIN — METRONIDAZOLE 500 MG: 500 INJECTION, SOLUTION INTRAVENOUS at 02:58

## 2022-07-19 RX ADMIN — MAGNESIUM SULFATE IN WATER 4 G: 40 INJECTION, SOLUTION INTRAVENOUS at 07:05

## 2022-07-19 RX ADMIN — METRONIDAZOLE 500 MG: 500 INJECTION, SOLUTION INTRAVENOUS at 11:05

## 2022-07-19 NOTE — PLAN OF CARE
Goal Outcome Evaluation:  Plan of Care Reviewed With: patient        Progress: no change  Outcome Evaluation: Pt seen for PT eval this date. Pt ambulated 24 ft then 18 ft with CGA for safety with Rwx. Pt was able to transfer to EOB MI. Pt declines need for HHPT at discharge, states his fiance will assist him. Pt is expected to benefit from skilled PTx during this inpatient stay to address deficits in strength gait and transfers.

## 2022-07-19 NOTE — DISCHARGE INSTRUCTIONS
You are being discharged home today.    Stop drinking alcohol as this is detrimental to your health.  Seek help after discharge.    Follow up with PCP this week for a recheck--PCP will have to give referral for GI.    Recommend GI outpatient referral.    Finish Levaquin as prescribed for 2 days to complete colitis treatment.

## 2022-07-19 NOTE — PLAN OF CARE
Goal Outcome Evaluation:  Plan of Care Reviewed With: patient        Progress: improving  Outcome Evaluation: OT tx completed. Patient completed bed mobility with mod ind, tf and functional mobility using RW with SBA-CGA to bathroom and back to chair. Patient completed toileting with CGA, grooming tasks with S/U and LBD with CGA. Patient completed BUE ther ex using red theraband. Continue OT POC

## 2022-07-19 NOTE — PLAN OF CARE
Goal Outcome Evaluation:  Plan of Care Reviewed With: patient        Progress: improving   Plan of care discussed with patient, pt stated understanding. Plan to DC patient home today.

## 2022-07-19 NOTE — CASE MANAGEMENT/SOCIAL WORK
Case Management Discharge Note                Selected Continued Care - Admitted Since 7/16/2022     Destination    No services have been selected for the patient.              Durable Medical Equipment    No services have been selected for the patient.              Dialysis/Infusion    No services have been selected for the patient.              Home Medical Care    No services have been selected for the patient.              Therapy    No services have been selected for the patient.              Community Resources    No services have been selected for the patient.              [facility]    No services have been selected for the patient.                  Transportation Services  Private: Car    Final Discharge Disposition Code: 01 - home or self-care

## 2022-07-19 NOTE — THERAPY TREATMENT NOTE
Patient Name: Gregor Fish  : 1960    MRN: [id number]                              Today's Date: 2022       Admit Date: 2022    Visit Dx:     ICD-10-CM ICD-9-CM   1. Colitis  K52.9 558.9   2. Orthostatic hypotension  I95.1 458.0   3. Hypokalemia  E87.6 276.8   4. Hyponatremia  E87.1 276.1   5. Dehydration  E86.0 276.51     Patient Active Problem List   Diagnosis   • Coronary artery disease involving native coronary artery of native heart without angina pectoris   • Essential hypertension   • Mixed hyperlipidemia   • Alcohol dependence with withdrawal (HCC)   • Colitis     Past Medical History:   Diagnosis Date   • Alcohol abuse    • Anxiety    • Chronic kidney disease    • COPD (chronic obstructive pulmonary disease) (HCC)    • Depression    • Hypertension    • Myocardial infarction (HCC)    • Withdrawal symptoms, alcohol (HCC)      Past Surgical History:   Procedure Laterality Date   • HERNIA REPAIR        General Information     Row Name 22 1213          OT Time and Intention    Document Type therapy note (daily note)  -SD     Mode of Treatment occupational therapy  -SD     Row Name 22 1213          General Information    Patient Profile Reviewed yes  -SD           User Key  (r) = Recorded By, (t) = Taken By, (c) = Cosigned By    Initials Name Provider Type    Opal Prasad OT Occupational Therapist                 Mobility/ADL's     Row Name 22 1213          Bed Mobility    Bed Mobility supine-sit  -SD     Supine-Sit [place] (Bed Mobility) modified independence  -SD     Row Name 22 1213          Transfers    Transfers sit-stand transfer;toilet transfer  -SD     Sit-Stand [place] (Transfers) standby assist  -SD     [place] Level (Toilet Transfer) standby assist  -SD     Assistive Device (Toilet Transfer) commode;grab bars/safety frame  -SD     Row Name 22 1213          Sit-Stand Transfer    Assistive Device (Sit-Stand Transfers) walker,  front-wheeled  -SD     Row Name 07/19/22 1213          Toilet Transfer    Type (Toilet Transfer) sit-stand;stand-sit  -SD     Row Name 07/19/22 1213          Functional Mobility    Functional Mobility- Ind. Level standby assist;contact guard assist  -SD     Functional Mobility- Device walker, front-wheeled  -SD     Functional Mobility-Distance (Feet) 40  -SD     Row Name 07/19/22 1213          Lower Body Dressing Assessment/Training    [place] Level (Lower Body Dressing) don;pants/bottoms;contact guard assist;standby assist  -SD     Row Name 07/19/22 1213          Grooming Assessment/Training    [place] Level (Grooming) oral care regimen;wash face, hands;set up  -SD     Row Name 07/19/22 1213          Toileting Assessment/Training    [place] Level (Toileting) contact guard assist  -SD     Assistive Devices (Toileting) commode;grab bar/safety frame  -SD           User Key  (r) = Recorded By, (t) = Taken By, (c) = Cosigned By    Initials Name Provider Type    Opal Prasad OT Occupational Therapist               Obj/Interventions     Row Name 07/19/22 1214          Shoulder (Therapeutic Exercise)    Shoulder (Therapeutic Exercise) strengthening exercise  -SD     Shoulder Strengthening (Therapeutic Exercise) bilateral;flexion;extension;aBduction;aDduction;horizontal aBduction/aDduction;red;10 repetitions  -[facility] Name 07/19/22 1214          Elbow/Forearm (Therapeutic Exercise)    Elbow/Forearm (Therapeutic Exercise) strengthening exercise  -SD     Elbow/Forearm Strengthening (Therapeutic Exercise) bilateral;flexion;extension;resistance band;red;10 repetitions  -SD     Row Name 07/19/22 1214          Motor Skills    Therapeutic Exercise shoulder;elbow/forearm  -SD           User Key  (r) = Recorded By, (t) = Taken By, (c) = Cosigned By    Initials Name Provider Type    Opal Prasad OT Occupational Therapist               Goals/Plan    No documentation.                Clinical Impression      Row Name 07/19/22 1215          Pain Assessment    Pretreatment Pain Rating 6/10  -SD     Posttreatment Pain Rating 6/10  -SD     Pain Location - Side/Orientation Bilateral  -SD     Pain Location lower  -SD     Pain Location - extremity  -SD     Pain Intervention(s) Repositioned;Ambulation/increased activity  -SD     Row Name 07/19/22 1215          Plan of Care Review    Plan of Care Reviewed With patient  -SD     Progress improving  -SD     Outcome Evaluation OT tx completed. Patient completed bed mobility with mod ind, tf and functional mobility using RW with SBA-CGA to bathroom and back to chair. Patient completed toileting with CGA, grooming tasks with S/U and LBD with CGA. Patient completed BUE ther ex using red theraband. Continue OT POC  -SD     Row Name 07/19/22 1215          Positioning and Restraints    Pre-Treatment Position in bed  -SD     Post Treatment Position chair  -SD     In Chair sitting;call light within reach;encouraged to call for assist  -SD           User Key  (r) = Recorded By, (t) = Taken By, (c) = Cosigned By    Initials Name Provider Type    Opal Prasad OT Occupational Therapist               Outcome Measures     Row Name 07/19/22 1216          How much help from another is currently needed...    Putting on and taking off regular lower body clothing? 3  -SD     Bathing (including washing, rinsing, and drying) 3  -SD     Toileting (which includes using toilet bed pan or urinal) 3  -SD     Putting on and taking off regular upper body clothing 4  -SD     Taking care of personal grooming (such as brushing teeth) 4  -SD     Eating meals 4  -SD     AM-PAC 6 Clicks Score (OT) 21  -SD     Row Name 07/19/22 1016          How much help from another person do you currently need...    Turning from your back to your side while in flat bed without using bedrails? 3  -MS     Moving from lying on back to sitting on the side of a flat bed without bedrails? 3  -MS     Moving to and from a bed  to a chair (including a wheelchair)? 3  -MS     Standing up from a chair using your arms (e.g., wheelchair, bedside chair)? 3  -MS     Climbing 3-5 steps with a railing? 3  -MS     To walk in hospital room? 3  -MS     AM-PAC 6 Clicks Score (PT) 18  -MS     Highest level of mobility 6 --> Walked 10 steps or more  -MS     Row Name 07/19/22 1216 07/19/22 1016       Functional Assessment    Outcome Measure Options AM-PAC 6 Clicks Daily Activity (OT)  -SD AM-PAC 6 Clicks Basic Mobility (PT)  -MS          User Key  (r) = Recorded By, (t) = Taken By, (c) = Cosigned By    Initials Name Provider Type    Opal Prasad OT Occupational Therapist    Justus Juan, PT Physical Therapist                Occupational Therapy Education                 Title: PT OT SLP Therapies (In Progress)     Topic: Occupational Therapy (In Progress)     Point: ADL training (Done)     Description:   Instruct learner(s) on proper safety adaptation and remediation techniques during self care or transfers.   Instruct in proper use of assistive devices.              Learning Progress Summary           Patient Acceptance, E,TB, VU by SD at 7/19/2022 1217    Comment: Safety and sequencing during functional tf and mobility.    Acceptance, E,TB, VU by SD at 7/18/2022 1308    Comment: Benefit of OT; OT POC                   Point: Home exercise program (Not Started)     Description:   Instruct learner(s) on appropriate technique for monitoring, assisting and/or progressing therapeutic exercises/activities.              Learner Progress:  Not documented in this visit.          Point: Precautions (Not Started)     Description:   Instruct learner(s) on prescribed precautions during self-care and functional transfers.              Learner Progress:  Not documented in this visit.          Point: Body mechanics (Not Started)     Description:   Instruct learner(s) on proper positioning and spine alignment during self-care, functional mobility  activities and/or exercises.              Learner Progress:  Not documented in this visit.                      User Key     Initials Effective Dates Name Provider Type Discipline    SD 06/16/21 -  Opal Flanagan OT Occupational Therapist OT              OT Recommendation and Plan  Planned Therapy Interventions (OT): activity tolerance training, adaptive equipment training, BADL retraining, patient/caregiver education/training, ROM/therapeutic exercise, strengthening exercise, transfer/mobility retraining  Therapy Frequency (OT): 3 times/wk (5 times if indicated)  Plan of Care Review  Plan of Care Reviewed With: patient  Progress: improving  Outcome Evaluation: OT tx completed. Patient completed bed mobility with mod ind, tf and functional mobility using RW with SBA-CGA to bathroom and back to chair. Patient completed toileting with CGA, grooming tasks with S/U and LBD with CGA. Patient completed BUE ther ex using red theraband. Continue OT POC     Time Calculation:    Time Calculation- OT     Row Name 07/19/22 1217             Time Calculation- OT    OT Start Time 0956  -SD      OT Stop Time 1019  -SD      OT Time Calculation (min) 23 min  -SD      OT Received On 07/19/22  -SD      OT Goal Re-Cert Due Date 07/28/22  -SD              Timed Charges    82134 - OT Therapeutic Exercise Minutes 10  -SD      19531 - OT Self Care/Mgmt Minutes 13  -SD              Total Minutes    Timed Charges Total Minutes 23  -SD       Total Minutes 23  -SD            User Key  (r) = Recorded By, (t) = Taken By, (c) = Cosigned By    Initials Name Provider Type    SD Opal Flanagan OT Occupational Therapist              Therapy Charges for Today     Code Description Service Date Service Provider Modifiers Qty    46693477731  OT EVAL LOW COMPLEXITY 3 7/18/2022 Opal Flanagan OT GO 1    58592202067 HC OT THER PROC EA 15 MIN 7/19/2022 Opal Flanagan OT GO 1    [id number]  OT SELF CARE/MGMT/TRAIN EA 15 MIN 7/19/2022 Panchito  Opal, OT GO 1               Opal Flanagan, CAN  7/19/2022

## 2022-07-19 NOTE — THERAPY EVALUATION
Patient Name: Gregor Fish  : 1960    MRN: [id number]                              Today's Date: 2022       Admit Date: 2022    Visit Dx:     ICD-10-CM ICD-9-CM   1. Colitis  K52.9 558.9   2. Orthostatic hypotension  I95.1 458.0   3. Hypokalemia  E87.6 276.8   4. Hyponatremia  E87.1 276.1   5. Dehydration  E86.0 276.51     Patient Active Problem List   Diagnosis   • Coronary artery disease involving native coronary artery of native heart without angina pectoris   • Essential hypertension   • Mixed hyperlipidemia   • Alcohol dependence with withdrawal (HCC)   • Colitis     Past Medical History:   Diagnosis Date   • Alcohol abuse    • Anxiety    • Chronic kidney disease    • COPD (chronic obstructive pulmonary disease) (HCC)    • Depression    • Hypertension    • Myocardial infarction (HCC)    • Withdrawal symptoms, alcohol (HCC)      Past Surgical History:   Procedure Laterality Date   • HERNIA REPAIR        General Information     Row Name 22 1003          Physical Therapy Time and Intention    Document Type evaluation  -MS     Mode of Treatment physical therapy  -MS     Row Name 22 1003          General Information    Patient Profile Reviewed yes  -MS     Prior Level of Function independent:;all household mobility  -MS     Existing Precautions/Restrictions fall  -MS     Barriers to Rehab medically complex  -MS     Row Name 22 1003          Living Environment    People in Home significant other  -MS     Row Name 22 1003          Home Main Entrance    Number of Stairs, Main Entrance six  -MS     Stair Railings, Main Entrance railings on both sides of stairs  -MS     Row Name 22 1003          Stairs Within Home, Primary    Number of Stairs, Within Home, Primary none  -MS     Row Name 22 1003          Cognition    Orientation Status (Cognition) oriented x 4  -MS     Row Name 22 1003          Safety Issues, Functional Mobility    Safety Issues Affecting  Function (Mobility) awareness of need for assistance;safety precaution awareness;impulsivity;safety precautions follow-through/compliance  -MS     Impairments Affecting Function (Mobility) balance;endurance/activity tolerance;strength;coordination  -MS           User Key  (r) = Recorded By, (t) = Taken By, (c) = Cosigned By    Initials Name Provider Type    Justus Juan PT Physical Therapist               Mobility     Row Name 07/19/22 1004          Bed Mobility    Bed Mobility supine-sit  -MS     Supine-Sit [place] (Bed Mobility) modified independence;verbal cues  -MS     Assistive Device (Bed Mobility) bed rails;head of bed elevated  -MS     Row Name 07/19/22 1004          Sit-Stand Transfer    Sit-Stand [place] (Transfers) standby assist;verbal cues  -MS     Assistive Device (Sit-Stand Transfers) walker, front-wheeled  -MS     Row Name 07/19/22 1004          Gait/Stairs (Locomotion)    [place] Level (Gait) contact guard;verbal cues  -MS     Assistive Device (Gait) walker, front-wheeled  -MS     Distance in Feet (Gait) 24,18  -MS     Deviations/Abnormal Patterns (Gait) base of support, wide;rupali decreased  -MS     Bilateral Gait Deviations forward flexed posture  -MS           User Key  (r) = Recorded By, (t) = Taken By, (c) = Cosigned By    Initials Name Provider Type    Justus Juan PT Physical Therapist               Obj/Interventions     Row Name 07/19/22 1011          Range of Motion Comprehensive    General Range of Motion lower extremity range of motion deficits identified  -MS     [facility] Name 07/19/22 1011          Strength Comprehensive (MMT)    General Manual Muscle Testing (MMT) Assessment lower extremity strength deficits identified  -MS     Comment, General Manual Muscle Testing (MMT) Assessment B LE 4-/5  -MS     Row Name 07/19/22 1011          Balance    Balance Assessment standing dynamic balance  -MS     Dynamic Standing Balance contact guard  -MS     Position/Device  Used, Standing Balance walker, front-wheeled  -MS     Row Name 07/19/22 1011          Sensory Assessment (Somatosensory)    Sensory Assessment (Somatosensory) sensation intact  -MS           User Key  (r) = Recorded By, (t) = Taken By, (c) = Cosigned By    Initials Name Provider Type    MS Justus Weaver, PT Physical Therapist               Goals/Plan     Row Name 07/19/22 1015          Bed Mobility Goal 1 (PT)    Activity/Assistive Device (Bed Mobility Goal 1, PT) bed mobility activities, all  -MS     [place] Level/Cues Needed (Bed Mobility Goal 1, PT) modified independence  -MS     Time Frame (Bed Mobility Goal 1, PT) long term goal (LTG);10 days  -MS     Row Name 07/19/22 1015          Transfer Goal 1 (PT)    Activity/Assistive Device (Transfer Goal 1, PT) transfers, all;sit-to-stand/stand-to-sit  -MS     [place] Level/Cues Needed (Transfer Goal 1, PT) modified independence  -MS     Time Frame (Transfer Goal 1, PT) long term goal (LTG);10 days  -MS     Row Name 07/19/22 1015          Gait Training Goal 1 (PT)    Activity/Assistive Device (Gait Training Goal 1, PT) gait (walking locomotion);assistive device use  -MS     [place] Level (Gait Training Goal 1, PT) modified independence  -MS     Distance (Gait Training Goal 1, PT) 150  -MS     Time Frame (Gait Training Goal 1, PT) long term goal (LTG);10 days  -MS     Row Name 07/19/22 1015          Patient Education Goal (PT)    Activity (Patient Education Goal, PT) ther exer x15 reps  -MS     [place]/Cues/Accuracy (Memory Goal 2, PT) demonstrates adequately  -MS     Time Frame (Patient Education Goal, PT) long term goal (LTG);10 days  -MS     Row Name 07/19/22 1015          Therapy Assessment/Plan (PT)    Planned Therapy Interventions (PT) balance training;bed mobility training;gait training;home exercise program;stair training;transfer training;strengthening;patient/family education  -MS           User Key  (r) = Recorded By, (t) = Taken By,  (c) = Cosigned By    Initials Name Provider Type    MS Justus Weaver, PT Physical Therapist               Clinical Impression     Row Name 07/19/22 1011          Pain    Pretreatment Pain Rating 6/10  -MS     Posttreatment Pain Rating 6/10  -MS     Pain Location - Side/Orientation Bilateral  -MS     Pain Location lower  -MS     Pain Location - foot;extremity  -MS     Pain Intervention(s) Repositioned;Ambulation/increased activity  -MS     Row Name 07/19/22 1011          Plan of Care Review    Plan of Care Reviewed With patient  -MS     Progress no change  -MS     Outcome Evaluation Pt seen for PT eval this date. Pt ambulated 24 ft then 18 ft with CGA for safety with Rwx. Pt was able to transfer to [facility]. Pt declines need for HHPT at discharge, states his fiance will assist him. Pt is expected to benefit from skilled PTx during this inpatient stay to address deficits in strength gait and transfers.  -MS     Row Name 07/19/22 1011          Therapy Assessment/Plan (PT)    Patient/Family Therapy Goals Statement (PT) to  go home  -MS     Rehab Potential (PT) good, to achieve stated therapy goals  -MS     Criteria for Skilled Interventions Met (PT) yes;meets criteria  -MS     Therapy Frequency (PT) 5 times/wk  -MS     Row Name 07/19/22 1011          Vital Signs    O2 Delivery Pre Treatment room air  -MS     O2 Delivery Intra Treatment room air  -MS     O2 Delivery Post Treatment room air  -MS     Pre Patient Position Supine  -MS     Intra Patient Position Standing  -MS     Post Patient Position Sitting  -MS     Row Name 07/19/22 1011          Positioning and Restraints    Pre-Treatment Position in bed  -MS     Post Treatment Position chair  -MS     In Chair sitting;call light within reach;encouraged to call for assist  -MS           User Key  (r) = Recorded By, (t) = Taken By, (c) = Cosigned By    Initials Name Provider Type    Justus Juan, PT Physical Therapist               Outcome Measures     Row Name  07/19/22 1016          How much help from another person do you currently need...    Turning from your back to your side while in flat bed without using bedrails? 3  -MS     Moving from lying on back to sitting on the side of a flat bed without bedrails? 3  -MS     Moving to and from a bed to a chair (including a wheelchair)? 3  -MS     Standing up from a chair using your arms (e.g., wheelchair, bedside chair)? 3  -MS     Climbing 3-5 steps with a railing? 3  -MS     To walk in hospital room? 3  -MS     AM-PAC 6 Clicks Score (PT) 18  -MS     Highest level of mobility 6 --> Walked 10 steps or more  -MS     Row Name 07/19/22 1016          Functional Assessment    Outcome Measure Options AM-PAC 6 Clicks Basic Mobility (PT)  -MS           User Key  (r) = Recorded By, (t) = Taken By, (c) = Cosigned By    Initials Name Provider Type    MS Justus Weaver PT Physical Therapist                             Physical Therapy Education                 Title: PT OT SLP Therapies (In Progress)     Topic: Physical Therapy (In Progress)     Point: Mobility training (Done)     Learning Progress Summary           Patient Acceptance, E, VU by MS at 7/19/2022 1016    Comment: importance of mobility                   Point: Home exercise program (Done)     Learning Progress Summary           Patient Acceptance, E, VU by MS at 7/19/2022 1016    Comment: importance of mobility                   Point: Body mechanics (Not Started)     Learner Progress:  Not documented in this visit.          Point: Precautions (Not Started)     Learner Progress:  Not documented in this visit.                      User Key     Initials Effective Dates Name Provider Type Discipline    MS 07/01/22 -  Justus Weaver PT Physical Therapist PT              PT Recommendation and Plan  Planned Therapy Interventions (PT): balance training, bed mobility training, gait training, home exercise program, stair training, transfer training, strengthening,  patient/family education  Plan of Care Reviewed With: patient  Progress: no change  Outcome Evaluation: Pt seen for PT eval this date. Pt ambulated 24 ft then 18 ft with CGA for safety with Rwx. Pt was able to transfer to [facility]. Pt declines need for HHPT at discharge, states his fiance will assist him. Pt is expected to benefit from skilled PTx during this inpatient stay to address deficits in strength gait and transfers.     Time Calculation:    PT Charges     Row Name 07/19/22 1207             Time Calculation    Start Time 0957  -MS      PT Received On 07/19/22  -MS      PT Goal Re-Cert Due Date 07/29/22  -MS              Untimed Charges    PT Eval/Re-eval Minutes 38  -MS              Total Minutes    Untimed Charges Total Minutes 38  -MS       Total Minutes 38  -MS            User Key  (r) = Recorded By, (t) = Taken By, (c) = Cosigned By    Initials Name Provider Type    Justus Juan PT Physical Therapist              Therapy Charges for Today     Code Description Service Date Service Provider Modifiers Qty    47996958572 HC PT EVAL LOW COMPLEXITY 3 7/19/2022 Justus Weaver PT GP 1          PT G-Codes  Outcome Measure Options: AM-PAC 6 Clicks Basic Mobility (PT)  AM-PAC 6 Clicks Score (PT): 18  AM-PAC 6 Clicks Score (OT): 18    Justus Weaver PT  7/19/2022

## 2022-07-19 NOTE — PLAN OF CARE
Goal Outcome Evaluation:  Plan of Care Reviewed With: patient        Progress: no change  Outcome Evaluation: No acute events overnight. Patient has rested well. VSS. Continue to monitor.

## 2022-07-19 NOTE — DISCHARGE SUMMARY
[facility]   DISCHARGE SUMMARY      Name:  Gregor Fish   Age:  61 y.o.  Sex:  male  :  1960  MRN:  [id number]   Visit Number:  [id number]    Admission Date:  2022  Date of Discharge:  2022  Primary Care Physician:  Ana Lilia West APRN    Important issues to note:    1.  Admitted to the hospital for acute colitis with nausea/ vomiting, and increased weakness.  Given IV fluids and Rocephin and Flagyl.      2.  Diarrhea markedly improved.  Patient tolerating diet today and feeling much better.  Refusing home health at discharge.  Stable for discharge home today. Will discharge with 2 more days of Levaquin for colitis treatment.      Discharge Diagnoses:     1. Acute Right-Sided Colitis, POA  2. Intractable Nausea, Vomiting, Diarrhea secondary to Above, POA  3. Orthostatic Hypotension, POA  4. Dehydration  5. Hypokalemia, POA  6. Hypomagnesemia, POA  7. High Anion Gap Metabolic Acidosis  8. Hyponatremia, POA  9. Chronic Alcohol Abuse  10. Chronic Tobacco Use  11. Depression  12. Anxiety  13. Essential Hypertension  14. Hyperlipidemia      Problem List:     Active Hospital Problems    Diagnosis  POA   • **Colitis [K52.9]  Yes   • Tobacco abuse [Z72.0]  Yes   • Alcohol dependence with withdrawal (HCC) [F10.239]  Yes   • Coronary artery disease involving native coronary artery of native heart without angina pectoris [I25.10]  Yes   • Essential hypertension [I10]  Yes   • Mixed hyperlipidemia [E78.2]  Yes      Resolved Hospital Problems   No resolved problems to display.     Presenting Problem:    Chief Complaint   Patient presents with   • Numbness   • Weakness - Generalized      Consults:     Consulting Physician(s)             None          History of presenting illness/Hospital Course:    Patient is a 61 year old male with past medical history of chronic alcohol abuse, COPD not on supplemental oxygen, CKD, hypertension, depression, and anxiety that presents to the  hospital with complaints of generalized weakness/malaise, nausea, vomiting, and myalgias. On admission, patient noted for the last week to have increasing weakness, myalgias, and fatigue and unable to tolerate much oral intake due to nausea, vomiting, and diarrhea.       Upon arrival to the ED, patient with a blood pressure of 98/83, pulse 110, temp 98.2, and oxygen saturation of 97% on room air. Labs revealed sodium 127, potassium 2.1, bicarb 20, chloride 89, anion gap 18. , ALT 76. Magnesium 1.5, procal 0.3. CT abdomen/pelvis without contrast revealed heterogeneous enhancement of liver; no convincing evidence of cirrhosis; inflammation of right side of colon consistent with acute colitis, small amount ascites. CT chest without contrast unremarkable. Patient was given fluid and electrolyte replacement.  Patient found to have significant orthostatic hypotension following administration of IVF.     Admitted to the hospital for further care.  GI panel with C diff negative. Given chronic alcohol use, CIWA protocol in place without any signs of withdrawal noted.  Continued on IV antibiotics with Rocephin and Flagyl.  Significant improvement in diarrhea noted.  PT/OT/case management consulted.  Patient declined any home health services at discharge stating his spouse will assist him.  Will order walker at discharge and bedside commode as recommended by occupational therapy. Patient tolerating oral intake the last 2 meals stating he ate every bite.  Denies any pain on exam on day of discharge.    Stable for discharge home today.  Will discharge with 2 more days of Levaquin for colitis treatment to total 5 days.  Will not send with Flagyl as patient will likely continue to drink alcohol.  Refusing home health at discharge.  Eating and drinking well with improvement in diarrhea.  Follow up with PCP this week for a recheck.  Recommend outpatient GI follow up for colonoscopy through PCP.  Return to the hospital with  any increased nausea, vomiting, or diarrhea.  Stop drinking alcohol.         Vital Signs:    Temp:  [97.8 °F (36.6 °C)-98.6 °F (37 °C)] 98.6 °F (37 °C)  Heart Rate:  [78-95] 89  Resp:  [16-18] 18  BP: (105-136)/() 113/78    Physical Exam:    General Appearance:  Alert and cooperative, pleasant middle aged male, no acute distress on exam, appears chronically ill   Head:  Atraumatic and normocephalic.   Eyes: Conjunctivae and sclerae normal, no icterus. No pallor.   Ears:  Ears with no abnormalities noted.   Throat: No oral lesions, no thrush, oral mucosa moist.   Neck: Supple, trachea midline   Back:   No kyphoscoliosis present. No tenderness to palpation.   Lungs:   Breath sounds heard bilaterally equally.  No crackles or wheezing. Unlabored on room air.   Heart:  Normal S1 and S2, no murmur, no gallop, no rub. No JVD.   Abdomen:   Normal bowel sounds, no masses, no organomegaly. Soft, nontender, nondistended, no rebound tenderness.   Extremities: Supple, no edema, no cyanosis, no clubbing.   Pulses: Pulses palpable bilaterally.   Skin: No bleeding or rash.   Neurologic: Alert and oriented x 3. No facial asymmetry. Moves all four limbs. No tremors present.     Pertinent Lab Results:     Results from last 7 days   Lab Units 07/19/22  0513 07/18/22  0614 07/17/22  0721 07/16/22  2102   SODIUM mmol/L 130* 133* 131* 127*   POTASSIUM mmol/L 3.2* 3.7 2.9* 2.1*   CHLORIDE mmol/L 101 106 100 89*   CO2 mmol/L 17.2* 17.3* 18.1* 20.0*   BUN mg/dL 2* 3* 4* 4*   CREATININE mg/dL 0.48* 0.45* 0.53* 0.71*   CALCIUM mg/dL 7.7* 7.6* 7.7* 8.0*   BILIRUBIN mg/dL  --   --   --  1.4*   ALK PHOS U/L  --   --   --  93   ALT (SGPT) U/L  --   --   --  76*   AST (SGOT) U/L  --   --   --  170*   GLUCOSE mg/dL 137* 110* 110* 139*     Results from last 7 days   Lab Units 07/18/22  0614 07/17/22  0721 07/16/22  2102   WBC 10*3/mm3 5.87 8.96 10.59   HEMOGLOBIN g/dL 9.6* 10.9* 12.7*   HEMATOCRIT % 27.4* 30.7* 34.2*   PLATELETS 10*3/mm3 195  197 244         Results from last 7 days   Lab Units 07/16/22  2102   TROPONIN T ng/mL <0.010                     Results from last 7 days   Lab Units 07/17/22  0208 07/17/22  0150   BLOODCX  No growth at 2 days No growth at 2 days       Pertinent Radiology Results:    Imaging Results (All)     Procedure Component Value Units Date/Time    XR Chest 1 View [[id number]] Collected: 07/17/22 0725     Updated: 07/17/22 0728    Narrative:      PROCEDURE: XR CHEST 1 VW-     HISTORY: Weak/Dizzy/AMS triage protocol     COMPARISON: 07/19/2010.     FINDINGS: The heart is normal in size. The mediastinum is unremarkable.  The lungs are clear. There is no pneumothorax.  There are no acute  osseous abnormalities.       Impression:      No acute cardiopulmonary process.     Continued followup is recommended.     This report was signed and finalized on 7/17/2022 7:26 AM by Toñito Gomez DO.    CT Chest Without Contrast Diagnostic [[id number]] Collected: 07/16/22 2300     Updated: 07/16/22 2301    Narrative:      FINAL REPORT    TECHNIQUE:  Routine axial images were obtained from the lung apices to below  the diaphragm without contrast.    CLINICAL HISTORY:  cough, dyspnea    FINDINGS:  The lungs are clear. The heart and vasculature are unremarkable.  There is no pleural disease, adenopathy, or significant osseous  abnormality.      Impression:      Unremarkable.    Authenticated and Electronically Signed by Joey Dos Santos M.D. on  07/16/2022 11:00:08 PM    CT Abdomen Pelvis Without Contrast [[id number]] Collected: 07/16/22 2300     Updated: 07/16/22 2301    Narrative:      FINAL REPORT    TECHNIQUE:  Routine axial images through the abdomen and pelvis were  obtained.    CLINICAL HISTORY:  nausea vomiting, weakness, elevated lft    FINDINGS:  Abdomen: No abnormality of the gallbladder is identified.  There  is somewhat heterogeneous enhancement of the liver, but a  discrete focal liver lesion is not seen.  There is no biliary  ductal  dilation.  The other solid abdominal organs and ureters  are normal.  There is circumferential wall thickening involving  the right side of the colon with pericolonic fat stranding,  consistent with acute colitis.  The GI tract is otherwise  unremarkable, with no sign of appendicitis.  Pelvis: The urinary  bladder is normal.  There is a small amount of ascites adjacent  to liver and spleen.  There is no acute osseous abnormality.      Impression:      Heterogeneous enhancement of the liver.  No convincing evidence  of cirrhosis.  Inflammation involving the right side of the  colon, consistent with acute colitis.  Small amount of ascites.    Authenticated and Electronically Signed by Joey Dos Santos M.D. on  07/16/2022 11:00:10 PM        Condition on Discharge:      Stable.    Code status during the hospital stay:    Code Status and Medical Interventions:   Ordered at: 07/17/22 0019     Code Status (Patient has no pulse and is not breathing):    CPR (Attempt to Resuscitate)     Medical Interventions (Patient has pulse or is breathing):    Full Support     Discharge Disposition:    Home or Self Care    Discharge Medications:       Discharge Medications      New Medications      Instructions Start Date   levoFLOXacin 500 MG tablet  Commonly known as: Levaquin   500 mg, Oral, Daily   Start Date: July 20, 2022        Continue These Medications      Instructions Start Date   atorvastatin 80 MG tablet  Commonly known as: LIPITOR   80 mg, Oral, Nightly      LISINOPRIL PO   10 mg, Oral, Once      omeprazole 20 MG capsule  Commonly known as: priLOSEC   20 mg, Oral, Daily      QUEtiapine 25 MG tablet  Commonly known as: SEROquel   250 mg, Oral, Nightly      sertraline 25 MG tablet  Commonly known as: ZOLOFT   25 mg, Oral, Daily           Discharge Diet:     Diet Instructions     Advance Diet As Tolerated      Diet: Regular      Discharge Diet: Regular    [place] diet        Activity at Discharge:     Activity Instructions     Activity  as Tolerated          Follow-up Appointments:     Follow-up Information     Ana Lilia West APRN Follow up.    Specialty: Nurse Practitioner  Why: Please schedule appointment to be seen this week for hospital follow up  Contact information:  [address]  [place] [id number] [phone]             Lucius Barragan MD Follow up.    Specialty: Gastroenterology  Why: Call for appointment after referral given by PCP  Contact information:  [address]  [address]  [facility] [address]  [phone]                       No future appointments.  Test Results Pending at Discharge:    Pending Labs     Order Current Status    Blood Culture - Blood, Hand, Right Preliminary result    Blood Culture - Blood, Hand, Right Preliminary result             TAMMI Ramirez  07/19/22  12:32 EDT    Time: I spent 33 minutes on this discharge activity which included: face-to-face encounter with the patient, reviewing the data in the system, coordination of the care with the nursing staff as well as consultants, documentation, and entering orders.     Dictated utilizing Dragon dictation.

## 2022-07-22 LAB
BACTERIA SPEC AEROBE CULT: NORMAL
BACTERIA SPEC AEROBE CULT: NORMAL

## 2022-08-02 ENCOUNTER — HOSPITAL ENCOUNTER (EMERGENCY)
Dept: HOSPITAL 79 - ER1 | Age: 62
LOS: 1 days | Discharge: HOME | End: 2022-08-03
Payer: OTHER GOVERNMENT

## 2022-08-02 DIAGNOSIS — I25.2: ICD-10-CM

## 2022-08-02 DIAGNOSIS — K52.9: Primary | ICD-10-CM

## 2022-08-02 DIAGNOSIS — F17.210: ICD-10-CM

## 2022-08-02 DIAGNOSIS — I10: ICD-10-CM

## 2022-08-02 DIAGNOSIS — J44.9: ICD-10-CM

## 2022-08-02 LAB
BUN/CREATININE RATIO: 7 (ref 0–10)
HGB BLD-MCNC: 12.7 GM/DL (ref 14–17.5)
RED BLOOD COUNT: 3.81 M/UL (ref 4.2–5.5)
WHITE BLOOD COUNT: 9.4 K/UL (ref 4.5–11)

## 2022-08-15 ENCOUNTER — HOSPITAL ENCOUNTER (EMERGENCY)
Facility: HOSPITAL | Age: 62
Discharge: HOME | End: 2022-08-16
Attending: EMERGENCY MEDICINE | Admitting: EMERGENCY MEDICINE

## 2022-08-15 ENCOUNTER — APPOINTMENT (OUTPATIENT)
Dept: CT IMAGING | Facility: HOSPITAL | Age: 62
End: 2022-08-15

## 2022-08-15 ENCOUNTER — APPOINTMENT (OUTPATIENT)
Dept: GENERAL RADIOLOGY | Facility: HOSPITAL | Age: 62
End: 2022-08-15

## 2022-08-15 DIAGNOSIS — R18.8 OTHER ASCITES: Primary | ICD-10-CM

## 2022-08-15 LAB
ALBUMIN SERPL-MCNC: 3.1 G/DL (ref 3.5–5.2)
ALBUMIN/GLOB SERPL: 1 G/DL
ALP SERPL-CCNC: 141 U/L (ref 39–117)
ALT SERPL W P-5'-P-CCNC: 32 U/L (ref 1–41)
AMMONIA BLD-SCNC: 29 UMOL/L (ref 16–60)
ANION GAP SERPL CALCULATED.3IONS-SCNC: 8.5 MMOL/L (ref 5–15)
AST SERPL-CCNC: 77 U/L (ref 1–40)
BASOPHILS # BLD AUTO: 0.04 10*3/MM3 (ref 0–0.2)
BASOPHILS NFR BLD AUTO: 0.5 % (ref 0–1.5)
BILIRUB SERPL-MCNC: 0.7 MG/DL (ref 0–1.2)
BUN SERPL-MCNC: 9 MG/DL (ref 8–23)
BUN/CREAT SERPL: 14.8 (ref 7–25)
CALCIUM SPEC-SCNC: 8.8 MG/DL (ref 8.6–10.5)
CHLORIDE SERPL-SCNC: 95 MMOL/L (ref 98–107)
CO2 SERPL-SCNC: 24.5 MMOL/L (ref 22–29)
CREAT SERPL-MCNC: 0.61 MG/DL (ref 0.76–1.27)
DEPRECATED RDW RBC AUTO: 47.9 FL (ref 37–54)
EGFRCR SERPLBLD CKD-EPI 2021: 108.6 ML/MIN/1.73
EOSINOPHIL # BLD AUTO: 0.22 10*3/MM3 (ref 0–0.4)
EOSINOPHIL NFR BLD AUTO: 2.7 % (ref 0.3–6.2)
ERYTHROCYTE [DISTWIDTH] IN BLOOD BY AUTOMATED COUNT: 14.2 % (ref 12.3–15.4)
ETHANOL BLD-MCNC: <10 MG/DL (ref 0–10)
ETHANOL UR QL: <0.01 %
GLOBULIN UR ELPH-MCNC: 3 GM/DL
GLUCOSE SERPL-MCNC: 98 MG/DL (ref 65–99)
HCT VFR BLD AUTO: 36 % (ref 37.5–51)
HGB BLD-MCNC: 12.7 G/DL (ref 13–17.7)
IMM GRANULOCYTES # BLD AUTO: 0.03 10*3/MM3 (ref 0–0.05)
IMM GRANULOCYTES NFR BLD AUTO: 0.4 % (ref 0–0.5)
LIPASE SERPL-CCNC: 50 U/L (ref 13–60)
LYMPHOCYTES # BLD AUTO: 1.72 10*3/MM3 (ref 0.7–3.1)
LYMPHOCYTES NFR BLD AUTO: 21.3 % (ref 19.6–45.3)
MAGNESIUM SERPL-MCNC: 1.8 MG/DL (ref 1.6–2.4)
MCH RBC QN AUTO: 32.6 PG (ref 26.6–33)
MCHC RBC AUTO-ENTMCNC: 35.3 G/DL (ref 31.5–35.7)
MCV RBC AUTO: 92.5 FL (ref 79–97)
MONOCYTES # BLD AUTO: 0.94 10*3/MM3 (ref 0.1–0.9)
MONOCYTES NFR BLD AUTO: 11.6 % (ref 5–12)
NEUTROPHILS NFR BLD AUTO: 5.14 10*3/MM3 (ref 1.7–7)
NEUTROPHILS NFR BLD AUTO: 63.5 % (ref 42.7–76)
NRBC BLD AUTO-RTO: 0 /100 WBC (ref 0–0.2)
NT-PROBNP SERPL-MCNC: 515.9 PG/ML (ref 0–900)
PLATELET # BLD AUTO: 221 10*3/MM3 (ref 140–450)
PMV BLD AUTO: 9.1 FL (ref 6–12)
POTASSIUM SERPL-SCNC: 3.4 MMOL/L (ref 3.5–5.2)
PROT SERPL-MCNC: 6.1 G/DL (ref 6–8.5)
RBC # BLD AUTO: 3.89 10*6/MM3 (ref 4.14–5.8)
SODIUM SERPL-SCNC: 128 MMOL/L (ref 136–145)
WBC NRBC COR # BLD: 8.09 10*3/MM3 (ref 3.4–10.8)

## 2022-08-15 PROCEDURE — 36415 COLL VENOUS BLD VENIPUNCTURE: CPT

## 2022-08-15 PROCEDURE — 71045 X-RAY EXAM CHEST 1 VIEW: CPT

## 2022-08-15 PROCEDURE — 82140 ASSAY OF AMMONIA: CPT | Performed by: EMERGENCY MEDICINE

## 2022-08-15 PROCEDURE — 85025 COMPLETE CBC W/AUTO DIFF WBC: CPT | Performed by: EMERGENCY MEDICINE

## 2022-08-15 PROCEDURE — 82077 ASSAY SPEC XCP UR&BREATH IA: CPT | Performed by: EMERGENCY MEDICINE

## 2022-08-15 PROCEDURE — 99283 EMERGENCY DEPT VISIT LOW MDM: CPT

## 2022-08-15 PROCEDURE — 83880 ASSAY OF NATRIURETIC PEPTIDE: CPT | Performed by: EMERGENCY MEDICINE

## 2022-08-15 PROCEDURE — 83735 ASSAY OF MAGNESIUM: CPT | Performed by: EMERGENCY MEDICINE

## 2022-08-15 PROCEDURE — 93005 ELECTROCARDIOGRAM TRACING: CPT | Performed by: EMERGENCY MEDICINE

## 2022-08-15 PROCEDURE — 80053 COMPREHEN METABOLIC PANEL: CPT | Performed by: EMERGENCY MEDICINE

## 2022-08-15 PROCEDURE — 83690 ASSAY OF LIPASE: CPT | Performed by: EMERGENCY MEDICINE

## 2022-08-15 PROCEDURE — 74176 CT ABD & PELVIS W/O CONTRAST: CPT

## 2022-08-16 VITALS
BODY MASS INDEX: 27.28 KG/M2 | DIASTOLIC BLOOD PRESSURE: 84 MMHG | OXYGEN SATURATION: 92 % | HEIGHT: 68 IN | SYSTOLIC BLOOD PRESSURE: 101 MMHG | TEMPERATURE: 98.8 F | WEIGHT: 180 LBS | HEART RATE: 95 BPM | RESPIRATION RATE: 18 BRPM

## 2022-08-16 NOTE — ED PROVIDER NOTES
Subjective   62-year-old male presents to the ED with a chief complaint of fluid retention.  Patient is complaining of increasing edema in his bilateral lower extremities as well as his abdomen.  He states that his abdomen feels larger and more distended than previously.  Patient does have a history of alcoholism but states that he has not had any alcohol and 1 month.  States that he quit after his most recent admission.  He was admitted and the middle of July of this year, approximately 1 month ago.  On that admission he was found to be severely orthostatic hypotension required multiple liters of fluids and had multiple electrolyte abnormalities.  He states that he has been doing well since discharge but this edema has been building up slowly over the last few weeks.  He denies abdominal pain.  No nausea vomiting or diarrhea.  No fever or chills.  No chest pain or shortness of breath.  No cough or wheeze.  No prior treatments or limiting factors.  No prior evaluations.  No other complaints this time.          Review of Systems   Constitutional: Negative for fatigue and fever.   Respiratory: Negative for shortness of breath and wheezing.    Cardiovascular: Positive for leg swelling. Negative for chest pain and palpitations.   Gastrointestinal: Positive for abdominal distention.   All other systems reviewed and are negative.      Past Medical History:   Diagnosis Date   • Alcohol abuse    • Anxiety    • Chronic kidney disease    • COPD (chronic obstructive pulmonary disease) (HCC)    • Depression    • Hypertension    • Myocardial infarction (HCC)    • Withdrawal symptoms, alcohol (HCC)        No Known Allergies    Past Surgical History:   Procedure Laterality Date   • HERNIA REPAIR         Family History   Problem Relation Age of Onset   • Diabetes Mother    • Hypertension Mother    • Heart attack Father    • No Known Problems Sister    • No Known Problems Brother        Social History     Socioeconomic History   •  Marital status: Single   Tobacco Use   • Smoking status: Current Every Day Smoker     Packs/day: 0.50     Types: Cigarettes   • Smokeless tobacco: Never Used   Substance and Sexual Activity   • Alcohol use: Yes     Comment: a double shot a day with one beer   • Drug use: No   • Sexual activity: Defer           Objective   Physical Exam  Vitals and nursing note reviewed.   Constitutional:       General: He is not in acute distress.     Appearance: He is well-developed. He is not diaphoretic.      Comments: Chronically ill-appearing   HENT:      Head: Normocephalic and atraumatic.      Nose: Nose normal.   Eyes:      Conjunctiva/sclera: Conjunctivae normal.      Pupils: Pupils are equal, round, and reactive to light.   Cardiovascular:      Rate and Rhythm: Normal rate and regular rhythm.      Pulses: Normal pulses.   Pulmonary:      Effort: Pulmonary effort is normal. No respiratory distress.      Breath sounds: Normal breath sounds.   Abdominal:      General: There is distension.      Palpations: Abdomen is soft.      Tenderness: There is no abdominal tenderness.      Comments: Large protrudent distended abdomen, nontender, hepatomegaly   Musculoskeletal:         General: No deformity.      Right lower leg: Edema present.      Left lower leg: Edema present.      Comments: 2+ pitting edema bilateral lower extremities   Neurological:      Mental Status: He is alert and oriented to person, place, and time.      Cranial Nerves: No cranial nerve deficit.      Coordination: Coordination normal.         Procedures           ED Course  ED Course as of 08/16/22 0006   Mon Aug 15, 2022   2228 EKG interpreted by me.  Sinus rhythm.  Rate of 96.  Low voltage in the chest leads.  Significant artifact present.  No obvious ST or T wave changes.  Abnormal EKG [CG]      ED Course User Index  [CG] Del Engel, DO                                           MDM  62-year-old male presents to the ED complaining of increasing fluid in  his bilateral lower extremities and abdomen/abdominal wall.  Labs are relatively reassuring with some mild electrolyte abnormalities.  Some mild hyponatremia and mild elevation in his LFTs.  He had blood work performed 2 weeks ago of which she has a copy.  His LFTs are actually improved.  His bili was 1.32 weeks ago his AST and ALT were both around 100.  There were 32 and 77 today.  No chest pain or shortness of breath.  No significant elevation in his BNP.  Troponin negative.  EKG unremarkable.  Chest x-ray does show mild small bilateral pleural effusions.  No significant pulmonary edema.  No hypoxia.  CT abdomen pelvis is consistent with ascites.  Is most likely related to his history of alcohol abuse and cirrhosis.  I feel the patient is appropriate for discharge to follow-up with gastroenterology.  Patient does not require emergent or urgent paracentesis at this time.  Discussed this with the patient.  He was agreeable to this plan.  Follow-up outpatient as needed.        Final diagnoses:   Other ascites       ED Disposition  ED Disposition     ED Disposition   Discharge    Condition   Stable    Comment   --             Ana Lilia West APRN  [address]  [facility] [address]  [phone]          Lucius Barragan MD  [address]  [address]  [facility] [address]  [phone]    Call            Medication List      No changes were made to your prescriptions during this visit.          Del Engel,   08/16/22 0006

## 2022-09-22 ENCOUNTER — LAB (OUTPATIENT)
Dept: LAB | Facility: HOSPITAL | Age: 62
End: 2022-09-22

## 2022-09-22 ENCOUNTER — OFFICE VISIT (OUTPATIENT)
Dept: GASTROENTEROLOGY | Facility: CLINIC | Age: 62
End: 2022-09-22

## 2022-09-22 VITALS
WEIGHT: 144 LBS | HEART RATE: 87 BPM | SYSTOLIC BLOOD PRESSURE: 91 MMHG | TEMPERATURE: 98.2 F | DIASTOLIC BLOOD PRESSURE: 60 MMHG | BODY MASS INDEX: 21.82 KG/M2 | HEIGHT: 68 IN

## 2022-09-22 DIAGNOSIS — G62.1 ALCOHOLIC PERIPHERAL NEUROPATHY: ICD-10-CM

## 2022-09-22 DIAGNOSIS — Z86.010 PERSONAL HISTORY OF COLONIC POLYPS: ICD-10-CM

## 2022-09-22 DIAGNOSIS — D64.9 NORMOCYTIC ANEMIA: ICD-10-CM

## 2022-09-22 DIAGNOSIS — K70.31 ALCOHOLIC CIRRHOSIS OF LIVER WITH ASCITES: ICD-10-CM

## 2022-09-22 DIAGNOSIS — Z11.59 ENCOUNTER FOR SCREENING FOR OTHER VIRAL DISEASES: ICD-10-CM

## 2022-09-22 DIAGNOSIS — K70.31 ALCOHOLIC CIRRHOSIS OF LIVER WITH ASCITES: Primary | ICD-10-CM

## 2022-09-22 DIAGNOSIS — K21.9 GASTROESOPHAGEAL REFLUX DISEASE WITHOUT ESOPHAGITIS: ICD-10-CM

## 2022-09-22 LAB
BASOPHILS # BLD AUTO: 0.04 10*3/MM3 (ref 0–0.2)
BASOPHILS NFR BLD AUTO: 0.6 % (ref 0–1.5)
DEPRECATED RDW RBC AUTO: 43.9 FL (ref 37–54)
EOSINOPHIL # BLD AUTO: 0.22 10*3/MM3 (ref 0–0.4)
EOSINOPHIL NFR BLD AUTO: 3.3 % (ref 0.3–6.2)
ERYTHROCYTE [DISTWIDTH] IN BLOOD BY AUTOMATED COUNT: 12.9 % (ref 12.3–15.4)
HCT VFR BLD AUTO: 34.7 % (ref 37.5–51)
HGB BLD-MCNC: 12.1 G/DL (ref 13–17.7)
IMM GRANULOCYTES # BLD AUTO: 0.02 10*3/MM3 (ref 0–0.05)
IMM GRANULOCYTES NFR BLD AUTO: 0.3 % (ref 0–0.5)
INR PPP: 1.24 (ref 0.9–1.1)
LYMPHOCYTES # BLD AUTO: 1.7 10*3/MM3 (ref 0.7–3.1)
LYMPHOCYTES NFR BLD AUTO: 25.9 % (ref 19.6–45.3)
MCH RBC QN AUTO: 32.3 PG (ref 26.6–33)
MCHC RBC AUTO-ENTMCNC: 34.9 G/DL (ref 31.5–35.7)
MCV RBC AUTO: 92.5 FL (ref 79–97)
MONOCYTES # BLD AUTO: 0.8 10*3/MM3 (ref 0.1–0.9)
MONOCYTES NFR BLD AUTO: 12.2 % (ref 5–12)
NEUTROPHILS NFR BLD AUTO: 3.79 10*3/MM3 (ref 1.7–7)
NEUTROPHILS NFR BLD AUTO: 57.7 % (ref 42.7–76)
NRBC BLD AUTO-RTO: 0 /100 WBC (ref 0–0.2)
PLATELET # BLD AUTO: 218 10*3/MM3 (ref 140–450)
PMV BLD AUTO: 10.3 FL (ref 6–12)
PROTHROMBIN TIME: 16 SECONDS (ref 12.5–14.5)
RBC # BLD AUTO: 3.75 10*6/MM3 (ref 4.14–5.8)
WBC NRBC COR # BLD: 6.57 10*3/MM3 (ref 3.4–10.8)

## 2022-09-22 PROCEDURE — 82728 ASSAY OF FERRITIN: CPT

## 2022-09-22 PROCEDURE — 86038 ANTINUCLEAR ANTIBODIES: CPT

## 2022-09-22 PROCEDURE — 86704 HEP B CORE ANTIBODY TOTAL: CPT

## 2022-09-22 PROCEDURE — 85610 PROTHROMBIN TIME: CPT

## 2022-09-22 PROCEDURE — 86381 MITOCHONDRIAL ANTIBODY EACH: CPT

## 2022-09-22 PROCEDURE — 86015 ACTIN ANTIBODY EACH: CPT

## 2022-09-22 PROCEDURE — 87340 HEPATITIS B SURFACE AG IA: CPT

## 2022-09-22 PROCEDURE — 86708 HEPATITIS A ANTIBODY: CPT

## 2022-09-22 PROCEDURE — 85025 COMPLETE CBC W/AUTO DIFF WBC: CPT

## 2022-09-22 PROCEDURE — 99204 OFFICE O/P NEW MOD 45 MIN: CPT | Performed by: INTERNAL MEDICINE

## 2022-09-22 PROCEDURE — 36415 COLL VENOUS BLD VENIPUNCTURE: CPT

## 2022-09-22 PROCEDURE — 86706 HEP B SURFACE ANTIBODY: CPT

## 2022-09-22 PROCEDURE — 83540 ASSAY OF IRON: CPT

## 2022-09-22 PROCEDURE — 86803 HEPATITIS C AB TEST: CPT

## 2022-09-22 PROCEDURE — 84466 ASSAY OF TRANSFERRIN: CPT

## 2022-09-22 RX ORDER — QUETIAPINE FUMARATE 200 MG/1
200 TABLET, FILM COATED ORAL
COMMUNITY
Start: 2022-07-27

## 2022-09-22 RX ORDER — FUROSEMIDE 20 MG/1
20 TABLET ORAL DAILY
COMMUNITY
Start: 2022-09-13 | End: 2022-09-22 | Stop reason: SDUPTHER

## 2022-09-22 RX ORDER — FUROSEMIDE 40 MG/1
40 TABLET ORAL DAILY
Qty: 30 TABLET | Refills: 5 | Status: SHIPPED | OUTPATIENT
Start: 2022-09-22 | End: 2022-10-12 | Stop reason: SDUPTHER

## 2022-09-22 RX ORDER — LISINOPRIL 10 MG/1
10 TABLET ORAL DAILY
COMMUNITY
Start: 2022-08-05

## 2022-09-22 RX ORDER — QUETIAPINE FUMARATE 50 MG/1
50 TABLET, FILM COATED ORAL NIGHTLY
COMMUNITY
Start: 2022-07-27

## 2022-09-22 RX ORDER — SODIUM CHLORIDE 9 MG/ML
70 INJECTION, SOLUTION INTRAVENOUS CONTINUOUS PRN
Status: CANCELLED | OUTPATIENT
Start: 2022-09-22

## 2022-09-22 RX ORDER — ATORVASTATIN CALCIUM 40 MG/1
40 TABLET, FILM COATED ORAL DAILY
COMMUNITY
Start: 2022-07-27 | End: 2022-09-22 | Stop reason: SDUPTHER

## 2022-09-22 RX ORDER — SPIRONOLACTONE 100 MG/1
100 TABLET, FILM COATED ORAL DAILY
Qty: 30 TABLET | Refills: 5 | Status: SHIPPED | OUTPATIENT
Start: 2022-09-22 | End: 2022-10-12

## 2022-09-22 RX ORDER — LANOLIN ALCOHOL/MO/W.PET/CERES
1000 CREAM (GRAM) TOPICAL DAILY
Qty: 30 TABLET | Refills: 5 | Status: SHIPPED | OUTPATIENT
Start: 2022-09-22

## 2022-09-22 RX ORDER — SPIRONOLACTONE 50 MG/1
50 TABLET, FILM COATED ORAL DAILY
COMMUNITY
Start: 2022-09-13 | End: 2022-09-22 | Stop reason: SDUPTHER

## 2022-09-22 RX ORDER — GABAPENTIN 400 MG/1
400 CAPSULE ORAL 3 TIMES DAILY
COMMUNITY

## 2022-09-22 RX ORDER — POTASSIUM CHLORIDE 750 MG/1
10 TABLET, FILM COATED, EXTENDED RELEASE ORAL DAILY
COMMUNITY
Start: 2022-09-13 | End: 2022-10-12 | Stop reason: SDUPTHER

## 2022-09-22 NOTE — PROGRESS NOTES
New Patient Consult      Date: 2022   Patient Name: Gregor Fish  MRN: [id number]  : 1960     Referring Physician: Ana Lilia West APRN    Chief Complaint   Patient presents with   • Ulcerative Colitis   • Per PCP referral:  Colitis       History of Present Illness: Gregor Fish is a 62 y.o. male who is here today to establish care with Gastroenterology for evaluation for recently diagnosed with cirrhosis.    Patient states that he has been having issues with the stomach in the form of diffuse abdominal discomfort abdominal distention nausea vomiting intermittently for the past 3 to 4 months.  He had a intermittent cough and diarrhea.  He also developed significant numbness in both upper extremities and the lower extremities for the last few months.  He was recently admitted in the hospital for above symptoms and a CT scan abdomen pelvis done revealed a signs of cirrhosis with ascites.  Also showed some colonic wall thickening on the right side suggesting possible colitis.  He has been discharged with the diuretics and since the diuretic was started his symptoms have improved significantly and his leg swelling and abdominal swelling is improved.    No prior history of any viral hepatitis.  He did not know that he had a liver disease.  He is a chronic alcoholic he used to drink at least fifth of hard liquor over 30 years.  He is also chronic smoker.  His dad had a stomach cancer.  No family history of any cirrhosis.  As per patient he had a colonoscopy about 3 to 5 years ago and had at least a 5 polyps removed.  No prior endoscopy.    He denies any dysphagia symptoms, no nausea vomiting or reflux symptoms.  Prior history of anemia.  He denies any jaundice.  He has been tired and fatigued these days.  He had a diarrhea few months ago but diarrhea has stopped now since he stopped alcohol in 2022..      Subjective      Past Medical History:   Past Medical History:   Diagnosis Date   • Acute  kidney failure (HCC) 1980   • Alcohol abuse    • Anemia    • Anxiety    • Back pain    • Bipolar disorder (HCC)    • Chronic kidney disease    • COPD (chronic obstructive pulmonary disease) (HCC)    • Depression    • Hyperlipidemia    • Hypertension    • Hypo-osmolality and hyponatremia    • Hypomagnesemia    • Insomnia    • Myocardial infarction (HCC)    • Orthostatic hypotension    • Sleep apnea    • Sleep trouble    • Snores    • Tattoos    • Withdrawal symptoms, alcohol (HCC)        Past Surgical History:   Past Surgical History:   Procedure Laterality Date   • INGUINAL HERNIA REPAIR Bilateral        Family History:   Family History   Problem Relation Age of Onset   • Diabetes Mother    • Hypertension Mother    • Stomach cancer Father    • Heart attack Father    • No Known Problems Sister    • No Known Problems Brother    • Colon cancer Neg Hx    • Cirrhosis Neg Hx    • Liver cancer Neg Hx    • Liver disease Neg Hx        Social History:   Social History     Socioeconomic History   • Marital status: Single   Tobacco Use   • Smoking status: Current Every Day Smoker     Packs/day: 0.50     Types: Cigarettes     Start date: 1970   • Smokeless tobacco: Never Used   Vaping Use   • Vaping Use: Former   Substance and Sexual Activity   • Alcohol use: Not Currently     Comment: Hx heavy use:  quit x 07/2022   • Drug use: No   • Sexual activity: Defer         Current Outpatient Medications:   •  ATORVASTATIN CALCIUM PO, Take  by mouth Every Night., Disp: , Rfl:   •  CHOLECALCIFEROL PO, Take  by mouth., Disp: , Rfl:   •  furosemide (LASIX) 40 MG tablet, Take 1 tablet by mouth Daily., Disp: 30 tablet, Rfl: 5  •  gabapentin (NEURONTIN) 400 MG capsule, Take 400 mg by mouth 3 (Three) Times a Day., Disp: , Rfl:   •  lisinopril (PRINIVIL,ZESTRIL) 10 MG tablet, Take 10 mg by mouth Daily., Disp: , Rfl:   •  omeprazole (priLOSEC) 20 MG capsule, Take 20 mg by mouth Daily., Disp: , Rfl:   •  potassium chloride 10 MEQ CR tablet, Take  "10 mEq by mouth Daily., Disp: , Rfl:   •  QUEtiapine (SEROquel) 200 MG tablet, Take 200 mg by mouth every night at bedtime., Disp: , Rfl:   •  QUEtiapine (SEROquel) 50 MG tablet, Take 50 mg by mouth Every Morning., Disp: , Rfl:   •  spironolactone (ALDACTONE) 100 MG tablet, Take 1 tablet by mouth Daily., Disp: 30 tablet, Rfl: 5  •  sertraline (ZOLOFT) 25 MG tablet, Take 1 tablet by mouth Daily., Disp: 30 tablet, Rfl: 0  •  vitamin B-12 (CYANOCOBALAMIN) 1000 MCG tablet, Take 1 tablet by mouth Daily., Disp: 30 tablet, Rfl: 5    No Known Allergies    Review of Systems:   Review of Systems   Constitutional: Positive for appetite change, fatigue and unexpected weight loss. Negative for fever.   HENT: Negative for trouble swallowing.    Gastrointestinal: Positive for abdominal distention, abdominal pain, nausea and vomiting. Negative for anal bleeding, blood in stool, constipation, diarrhea, rectal pain, GERD and indigestion.       The following portions of the patient's history were reviewed and updated as appropriate: allergies, current medications, past family history, past medical history, past social history, past surgical history and problem list.    Objective     Physical Exam:  Vital Signs:   Vitals:    09/22/22 1343   BP: 91/60   Pulse: 87   Temp: 98.2 °F (36.8 °C)   TempSrc: Infrared   Weight: 65.3 kg (144 lb)   Height: 172.7 cm (68\")       Physical Exam  Constitutional:       Appearance: Normal appearance.   HENT:      Head: Normocephalic and atraumatic.   Eyes:      General: No scleral icterus.     Comments: Pallor present   Abdominal:      General: Abdomen is flat. There is distension (Mild abdominal distention noted with ascites).      Palpations: There is no mass.      Tenderness: There is no abdominal tenderness. There is no guarding or rebound.      Hernia: No hernia is present.   Musculoskeletal:      Cervical back: Normal range of motion and neck supple.   Neurological:      Mental Status: He is alert. "           Results Review:   I have reviewed the patient's new clinical and imaging results and agree with the interpretation.     Lab on 09/22/2022   Component Date Value Ref Range Status   • Protime 09/22/2022 16.0 (A) 12.5 - 14.5 Seconds Final   • INR 09/22/2022 1.24 (A) 0.90 - 1.10 Final   Admission on 08/15/2022, Discharged on 08/16/2022   Component Date Value Ref Range Status   • Glucose 08/15/2022 98  65 - 99 mg/dL Final   • BUN 08/15/2022 9  8 - 23 mg/dL Final   • Creatinine 08/15/2022 0.61 (A) 0.76 - 1.27 mg/dL Final   • Sodium 08/15/2022 128 (A) 136 - 145 mmol/L Final   • Potassium 08/15/2022 3.4 (A) 3.5 - 5.2 mmol/L Final   • Chloride 08/15/2022 95 (A) 98 - 107 mmol/L Final   • CO2 08/15/2022 24.5  22.0 - 29.0 mmol/L Final   • Calcium 08/15/2022 8.8  8.6 - 10.5 mg/dL Final   • Total Protein 08/15/2022 6.1  6.0 - 8.5 g/dL Final   • Albumin 08/15/2022 3.10 (A) 3.50 - 5.20 g/dL Final   • ALT (SGPT) 08/15/2022 32  1 - 41 U/L Final   • AST (SGOT) 08/15/2022 77 (A) 1 - 40 U/L Final   • Alkaline Phosphatase 08/15/2022 141 (A) 39 - 117 U/L Final   • Total Bilirubin 08/15/2022 0.7  0.0 - 1.2 mg/dL Final   • Globulin 08/15/2022 3.0  gm/dL Final   • A/G Ratio 08/15/2022 1.0  g/dL Final   • BUN/Creatinine Ratio 08/15/2022 14.8  7.0 - 25.0 Final   • Anion Gap 08/15/2022 8.5  5.0 - 15.0 mmol/L Final   • eGFR 08/15/2022 108.6  >60.0 mL/min/1.73 Final    National Kidney Foundation and American Society of Nephrology (ASN) Task Force recommended calculation based on the Chronic Kidney Disease Epidemiology Collaboration (CKD-EPI) equation refit without adjustment for race.   • Lipase 08/15/2022 50  13 - 60 U/L Final   • Ethanol 08/15/2022 <10  0 - 10 mg/dL Final   • Ethanol % 08/15/2022 <0.010  % Final   • Ammonia 08/15/2022 29  16 - 60 umol/L Final   • Magnesium 08/15/2022 1.8  1.6 - 2.4 mg/dL Final   • proBNP 08/15/2022 515.9  0.0 - 900.0 pg/mL Final   • WBC 08/15/2022 8.09  3.40 - 10.80 10*3/mm3 Final   • RBC 08/15/2022  3.89 (A) 4.14 - 5.80 10*6/mm3 Final   • Hemoglobin 08/15/2022 12.7 (A) 13.0 - 17.7 g/dL Final   • Hematocrit 08/15/2022 36.0 (A) 37.5 - 51.0 % Final   • MCV 08/15/2022 92.5  79.0 - 97.0 fL Final   • MCH 08/15/2022 32.6  26.6 - 33.0 pg Final   • MCHC 08/15/2022 35.3  31.5 - 35.7 g/dL Final   • RDW 08/15/2022 14.2  12.3 - 15.4 % Final   • RDW-SD 08/15/2022 47.9  37.0 - 54.0 fl Final   • MPV 08/15/2022 9.1  6.0 - 12.0 fL Final   • Platelets 08/15/2022 221  140 - 450 10*3/mm3 Final   • Neutrophil % 08/15/2022 63.5  42.7 - 76.0 % Final   • Lymphocyte % 08/15/2022 21.3  19.6 - 45.3 % Final   • Monocyte % 08/15/2022 11.6  5.0 - 12.0 % Final   • Eosinophil % 08/15/2022 2.7  0.3 - 6.2 % Final   • Basophil % 08/15/2022 0.5  0.0 - 1.5 % Final   • Immature Grans % 08/15/2022 0.4  0.0 - 0.5 % Final   • Neutrophils, Absolute 08/15/2022 5.14  1.70 - 7.00 10*3/mm3 Final   • Lymphocytes, Absolute 08/15/2022 1.72  0.70 - 3.10 10*3/mm3 Final   • Monocytes, Absolute 08/15/2022 0.94 (A) 0.10 - 0.90 10*3/mm3 Final   • Eosinophils, Absolute 08/15/2022 0.22  0.00 - 0.40 10*3/mm3 Final   • Basophils, Absolute 08/15/2022 0.04  0.00 - 0.20 10*3/mm3 Final   • Immature Grans, Absolute 08/15/2022 0.03  0.00 - 0.05 10*3/mm3 Final   • nRBC 08/15/2022 0.0  0.0 - 0.2 /100 WBC Final   Admission on 07/16/2022, Discharged on 07/19/2022   Component Date Value Ref Range Status   • Glucose 07/16/2022 139 (A) 65 - 99 mg/dL Final   • BUN 07/16/2022 4 (A) 8 - 23 mg/dL Final   • Creatinine 07/16/2022 0.71 (A) 0.76 - 1.27 mg/dL Final   • Sodium 07/16/2022 127 (A) 136 - 145 mmol/L Final   • Potassium 07/16/2022 2.1 (A) 3.5 - 5.2 mmol/L Final   • Chloride 07/16/2022 89 (A) 98 - 107 mmol/L Final   • CO2 07/16/2022 20.0 (A) 22.0 - 29.0 mmol/L Final   • Calcium 07/16/2022 8.0 (A) 8.6 - 10.5 mg/dL Final   • Total Protein 07/16/2022 6.5  6.0 - 8.5 g/dL Final   • Albumin 07/16/2022 3.00 (A) 3.50 - 5.20 g/dL Final   • ALT (SGPT) 07/16/2022 76 (A) 1 - 41 U/L Final   •  AST (SGOT) 07/16/2022 170 (A) 1 - 40 U/L Final   • Alkaline Phosphatase 07/16/2022 93  39 - 117 U/L Final   • Total Bilirubin 07/16/2022 1.4 (A) 0.0 - 1.2 mg/dL Final   • Globulin 07/16/2022 3.5  gm/dL Final   • A/G Ratio 07/16/2022 0.9  g/dL Final   • BUN/Creatinine Ratio 07/16/2022 5.6 (A) 7.0 - 25.0 Final   • Anion Gap 07/16/2022 18.0 (A) 5.0 - 15.0 mmol/L Final   • eGFR 07/16/2022 104.4  >60.0 mL/min/1.73 Final    National Kidney Foundation and American Society of Nephrology (ASN) Task Force recommended calculation based on the Chronic Kidney Disease Epidemiology Collaboration (CKD-EPI) equation refit without adjustment for race.   • Troponin T 07/16/2022 <0.010  0.000 - 0.030 ng/mL Final   • Magnesium 07/16/2022 1.5 (A) 1.6 - 2.4 mg/dL Final   • Color, UA 07/17/2022 Yellow  Yellow, Straw Final   • Appearance, UA 07/17/2022 Clear  Clear Final   • pH, UA 07/17/2022 6.5  5.0 - 8.0 Final   • Specific Gravity, UA 07/17/2022 1.008  1.005 - 1.030 Final   • Glucose, UA 07/17/2022 Negative  Negative Final   • Ketones, UA 07/17/2022 Negative  Negative Final   • Bilirubin, UA 07/17/2022 Negative  Negative Final   • Blood, UA 07/17/2022 Negative  Negative Final   • Protein, UA 07/17/2022 Negative  Negative Final   • Leuk Esterase, UA 07/17/2022 Negative  Negative Final   • Nitrite, UA 07/17/2022 Negative  Negative Final   • Urobilinogen, UA 07/17/2022 1.0 E.U./dL  0.2 - 1.0 E.U./dL Final   • Extra Tube 07/16/2022 Hold for add-ons.   Final    Auto resulted.   • Extra Tube 07/16/2022 hold for add-on   Final    Auto resulted   • Extra Tube 07/16/2022 Hold for add-ons.   Final    Auto resulted.   • Extra Tube 07/16/2022 Hold for add-ons.   Final    Auto resulted   • WBC 07/16/2022 10.59  3.40 - 10.80 10*3/mm3 Final   • RBC 07/16/2022 3.71 (A) 4.14 - 5.80 10*6/mm3 Final   • Hemoglobin 07/16/2022 12.7 (A) 13.0 - 17.7 g/dL Final   • Hematocrit 07/16/2022 34.2 (A) 37.5 - 51.0 % Final   • MCV 07/16/2022 92.2  79.0 - 97.0 fL Final    • MCH 07/16/2022 34.2 (A) 26.6 - 33.0 pg Final   • MCHC 07/16/2022 37.1 (A) 31.5 - 35.7 g/dL Final   • RDW 07/16/2022 14.3  12.3 - 15.4 % Final   • RDW-SD 07/16/2022 47.7  37.0 - 54.0 fl Final   • MPV 07/16/2022 10.7  6.0 - 12.0 fL Final   • Platelets 07/16/2022 244  140 - 450 10*3/mm3 Final   • Neutrophil % 07/16/2022 73.3  42.7 - 76.0 % Final   • Lymphocyte % 07/16/2022 16.1 (A) 19.6 - 45.3 % Final   • Monocyte % 07/16/2022 8.6  5.0 - 12.0 % Final   • Eosinophil % 07/16/2022 0.8  0.3 - 6.2 % Final   • Basophil % 07/16/2022 0.4  0.0 - 1.5 % Final   • Immature Grans % 07/16/2022 0.8 (A) 0.0 - 0.5 % Final   • Neutrophils, Absolute 07/16/2022 7.77 (A) 1.70 - 7.00 10*3/mm3 Final   • Lymphocytes, Absolute 07/16/2022 1.70  0.70 - 3.10 10*3/mm3 Final   • Monocytes, Absolute 07/16/2022 0.91 (A) 0.10 - 0.90 10*3/mm3 Final   • Eosinophils, Absolute 07/16/2022 0.09  0.00 - 0.40 10*3/mm3 Final   • Basophils, Absolute 07/16/2022 0.04  0.00 - 0.20 10*3/mm3 Final   • Immature Grans, Absolute 07/16/2022 0.08 (A) 0.00 - 0.05 10*3/mm3 Final   • nRBC 07/16/2022 0.2  0.0 - 0.2 /100 WBC Final   • Glucose 07/16/2022 139 (A) 70 - 130 mg/dL Final    Serial Number: [id number]:  [id number]   • Ethanol 07/16/2022 <10  0 - 10 mg/dL Final   • Ethanol % 07/16/2022 <0.010  % Final   • Procalcitonin 07/16/2022 0.30 (A) 0.00 - 0.25 ng/mL Final   • Lactate 07/16/2022 1.9  0.5 - 2.0 mmol/L Final   • COVID19 07/17/2022 Not Detected  Not Detected - Ref. Range Final   • Influenza A PCR 07/17/2022 Not Detected  Not Detected Final   • Influenza B PCR 07/17/2022 Not Detected  Not Detected Final   • Glucose 07/17/2022 110 (A) 65 - 99 mg/dL Final   • BUN 07/17/2022 4 (A) 8 - 23 mg/dL Final   • Creatinine 07/17/2022 0.53 (A) 0.76 - 1.27 mg/dL Final   • Sodium 07/17/2022 131 (A) 136 - 145 mmol/L Final   • Potassium 07/17/2022 2.9 (A) 3.5 - 5.2 mmol/L Final    Slight hemolysis detected by analyzer. Results may be affected.   • Chloride 07/17/2022 100  98  - 107 mmol/L Final   • CO2 07/17/2022 18.1 (A) 22.0 - 29.0 mmol/L Final   • Calcium 07/17/2022 7.7 (A) 8.6 - 10.5 mg/dL Final   • BUN/Creatinine Ratio 07/17/2022 7.5  7.0 - 25.0 Final   • Anion Gap 07/17/2022 12.9  5.0 - 15.0 mmol/L Final   • eGFR 07/17/2022 114.0  >60.0 mL/min/1.73 Final    National Kidney Foundation and American Society of Nephrology (ASN) Task Force recommended calculation based on the Chronic Kidney Disease Epidemiology Collaboration (CKD-EPI) equation refit without adjustment for race.   • WBC 07/17/2022 8.96  3.40 - 10.80 10*3/mm3 Final   • RBC 07/17/2022 3.19 (A) 4.14 - 5.80 10*6/mm3 Final   • Hemoglobin 07/17/2022 10.9 (A) 13.0 - 17.7 g/dL Final   • Hematocrit 07/17/2022 30.7 (A) 37.5 - 51.0 % Final   • MCV 07/17/2022 96.2  79.0 - 97.0 fL Final   • MCH 07/17/2022 34.2 (A) 26.6 - 33.0 pg Final   • MCHC 07/17/2022 35.5  31.5 - 35.7 g/dL Final   • RDW 07/17/2022 14.6  12.3 - 15.4 % Final   • RDW-SD 07/17/2022 51.2  37.0 - 54.0 fl Final   • MPV 07/17/2022 10.5  6.0 - 12.0 fL Final   • Platelets 07/17/2022 197  140 - 450 10*3/mm3 Final   • Neutrophil % 07/17/2022 73.7  42.7 - 76.0 % Final   • Lymphocyte % 07/17/2022 15.7 (A) 19.6 - 45.3 % Final   • Monocyte % 07/17/2022 8.8  5.0 - 12.0 % Final   • Eosinophil % 07/17/2022 1.2  0.3 - 6.2 % Final   • Basophil % 07/17/2022 0.2  0.0 - 1.5 % Final   • Immature Grans % 07/17/2022 0.4  0.0 - 0.5 % Final   • Neutrophils, Absolute 07/17/2022 6.59  1.70 - 7.00 10*3/mm3 Final   • Lymphocytes, Absolute 07/17/2022 1.41  0.70 - 3.10 10*3/mm3 Final   • Monocytes, Absolute 07/17/2022 0.79  0.10 - 0.90 10*3/mm3 Final   • Eosinophils, Absolute 07/17/2022 0.11  0.00 - 0.40 10*3/mm3 Final   • Basophils, Absolute 07/17/2022 0.02  0.00 - 0.20 10*3/mm3 Final   • Immature Grans, Absolute 07/17/2022 0.04  0.00 - 0.05 10*3/mm3 Final   • nRBC 07/17/2022 0.0  0.0 - 0.2 /100 WBC Final   • Blood Culture 07/17/2022 No growth at 5 days   Final   • Blood Culture 07/17/2022 No  growth at 5 days   Final   • Campylobacter 07/17/2022 Not Detected  Not Detected Final   • Plesiomonas shigelloides 07/17/2022 Not Detected  Not Detected Final   • Salmonella 07/17/2022 Not Detected  Not Detected Final   • Vibrio 07/17/2022 Not Detected  Not Detected Final   • Vibrio cholerae 07/17/2022 Not Detected  Not Detected Final   • Yersinia enterocolitica 07/17/2022 Not Detected  Not Detected Final   • Enteroaggregative E. coli (EAEC) 07/17/2022 Not Detected  Not Detected Final   • Enteropathogenic E. coli (EPEC) 07/17/2022 Not Detected  Not Detected Final   • Enterotoxigenic E. coli (ETEC) lt/* 07/17/2022 Not Detected  Not Detected Final   • Shiga-like toxin-producing E. coli* 07/17/2022 Not Detected  Not Detected Final   • Shigella/Enteroinvasive E. coli (E* 07/17/2022 Not Detected  Not Detected Final   • Cryptosporidium 07/17/2022 Not Detected  Not Detected Final   • Cyclospora cayetanensis 07/17/2022 Not Detected  Not Detected Final   • Entamoeba histolytica 07/17/2022 Not Detected  Not Detected Final   • Giardia lamblia 07/17/2022 Not Detected  Not Detected Final   • Adenovirus F40/41 07/17/2022 Not Detected  Not Detected Final   • Astrovirus 07/17/2022 Not Detected  Not Detected Final   • Norovirus GI/GII 07/17/2022 Not Detected  Not Detected Final   • Rotavirus A 07/17/2022 Not Detected  Not Detected Final   • Sapovirus (I, II, IV or V) 07/17/2022 Not Detected  Not Detected Final   • C Diff GDH / Toxin 07/17/2022 Negative  Negative Final   • WBC 07/18/2022 5.87  3.40 - 10.80 10*3/mm3 Final   • RBC 07/18/2022 2.79 (A) 4.14 - 5.80 10*6/mm3 Final   • Hemoglobin 07/18/2022 9.6 (A) 13.0 - 17.7 g/dL Final   • Hematocrit 07/18/2022 27.4 (A) 37.5 - 51.0 % Final   • MCV 07/18/2022 98.2 (A) 79.0 - 97.0 fL Final   • MCH 07/18/2022 34.4 (A) 26.6 - 33.0 pg Final   • MCHC 07/18/2022 35.0  31.5 - 35.7 g/dL Final   • RDW 07/18/2022 14.6  12.3 - 15.4 % Final   • RDW-SD 07/18/2022 52.2  37.0 - 54.0 fl Final   • MPV  07/18/2022 9.9  6.0 - 12.0 fL Final   • Platelets 07/18/2022 195  140 - 450 10*3/mm3 Final   • Glucose 07/18/2022 110 (A) 65 - 99 mg/dL Final   • BUN 07/18/2022 3 (A) 8 - 23 mg/dL Final   • Creatinine 07/18/2022 0.45 (A) 0.76 - 1.27 mg/dL Final   • Sodium 07/18/2022 133 (A) 136 - 145 mmol/L Final   • Potassium 07/18/2022 3.7  3.5 - 5.2 mmol/L Final   • Chloride 07/18/2022 106  98 - 107 mmol/L Final   • CO2 07/18/2022 17.3 (A) 22.0 - 29.0 mmol/L Final   • Calcium 07/18/2022 7.6 (A) 8.6 - 10.5 mg/dL Final   • BUN/Creatinine Ratio 07/18/2022 6.7 (A) 7.0 - 25.0 Final   • Anion Gap 07/18/2022 9.7  5.0 - 15.0 mmol/L Final   • eGFR 07/18/2022 119.8  >60.0 mL/min/1.73 Final    National Kidney Foundation and American Society of Nephrology (ASN) Task Force recommended calculation based on the Chronic Kidney Disease Epidemiology Collaboration (CKD-EPI) equation refit without adjustment for race.   • Magnesium 07/18/2022 1.8  1.6 - 2.4 mg/dL Final   • Magnesium 07/19/2022 1.6  1.6 - 2.4 mg/dL Final   • Glucose 07/19/2022 137 (A) 65 - 99 mg/dL Final   • BUN 07/19/2022 2 (A) 8 - 23 mg/dL Final   • Creatinine 07/19/2022 0.48 (A) 0.76 - 1.27 mg/dL Final   • Sodium 07/19/2022 130 (A) 136 - 145 mmol/L Final   • Potassium 07/19/2022 3.2 (A) 3.5 - 5.2 mmol/L Final   • Chloride 07/19/2022 101  98 - 107 mmol/L Final   • CO2 07/19/2022 17.2 (A) 22.0 - 29.0 mmol/L Final   • Calcium 07/19/2022 7.7 (A) 8.6 - 10.5 mg/dL Final   • BUN/Creatinine Ratio 07/19/2022 4.2 (A) 7.0 - 25.0 Final   • Anion Gap 07/19/2022 11.8  5.0 - 15.0 mmol/L Final   • eGFR 07/19/2022 117.5  >60.0 mL/min/1.73 Final    National Kidney Foundation and American Society of Nephrology (ASN) Task Force recommended calculation based on the Chronic Kidney Disease Epidemiology Collaboration (CKD-EPI) equation refit without adjustment for race.      CT Abdomen Pelvis Without Contrast    Result Date: 8/15/2022  Ascites on the basis of cirrhosis but convincing evidence of portal  hypertension is not seen. Authenticated and Electronically Signed by Joey Dos Santos M.D. on 08/15/2022 11:01:20 PM    CT Abdomen Pelvis Without Contrast    Result Date: 7/16/2022  Heterogeneous enhancement of the liver.  No convincing evidence of cirrhosis.  Inflammation involving the right side of the colon, consistent with acute colitis.  Small amount of ascites. Authenticated and Electronically Signed by Joey Dos Santos M.D. on 07/16/2022 11:00:10 PM    CT Chest Without Contrast Diagnostic    Result Date: 7/16/2022  Unremarkable. Authenticated and Electronically Signed by Joey Dos Santos M.D. on 07/16/2022 11:00:08 PM    XR Chest 1 View    Result Date: 8/16/2022  Minimal bilateral pleural effusions with mild atelectasis.  This report was signed and finalized on 8/16/2022 8:23 AM by Jared Taveras MD.    XR Chest 1 View    Result Date: 7/17/2022  No acute cardiopulmonary process.  Continued followup is recommended.  This report was signed and finalized on 7/17/2022 7:26 AM by Toñito Gomez DO.      Assessment / Plan      Assessment & Plan:  1.  Decompensated alcoholic cirrhosis of liver with ascites (HCC); MELD; to be calculated after todays blood work  2. Alcoholic peripheral neuropathy (HCC)  3. Normocytic anemia    Patient history is consistent with alcohol induced liver disease with decompensated alcoholic cirrhosis with ascites.   He stopped drinking alcohol since July 2022.   We will get a basic work-up to rule out any other etiology.  Patient also has a significant history suggestive of peripheral neuropathy and this appears to be alcohol induced neuropathy.  We will also get a vitamin B12 level.  Advised to take multivitamins p.o. daily along with vitamin B12 1000 mcg prescription given.  Anemia appears to be secondary to cirrhosis.  With a significant alcohol abuse intermittent upper GI bleed suspected.  His recent CT abdomen pelvis reviewed and findings are more suggestive of either viral colitis or colonic wall thickening  secondary to ascites and cirrhosis.    He needs a EGD to rule out esophageal varices.  Low-salt diet and discussed with the patient  Patient does have moderate ascites Will increase his Lasix to 40 mg along with Aldactone 100 mg p.o. daily for now  Will get his lab work done today,   Ultrasound abdomen requested  We will continue low-dose PPI for now    The indications, technique, alternatives and potential risk and complications were discussed with the patient including but not limited to bleeding, bowel perforations, missing lesions and anesthetic complications. The patient understands and wishes to proceed with the procedure and has given their verbal consent. Written patient education information was given to the patient.   The patient will call if they have further questions before procedure.     - US Abdomen Complete; Future  - CBC Auto Differential; Future  - Hepatitis A Antibody, Total; Future  - Hepatitis B Core Antibody, Total; Future  - Hepatitis B Surface Antibody; Future  - Hepatitis B Surface Antigen; Future  - Hepatitis C Antibody; Future  - Iron Profile; Future  - Ferritin; Future  - Mitochondrial Antibodies, M2; Future  - Protime-INR; Future  - Anti-Smooth Muscle Antibody Titer; Future  - VAL; Future  - Case Request; Standing  - Case Request      4. Personal history of colonic polyps  As per patient he had a colonoscopy done over 3 years ago and had a 5 polyps removed.  He is overdue for his surveillance colonoscopy  We will schedule him for colonoscopy when once the EGD is performed and esophageal varices ruled out.      5. Gastroesophageal reflux disease without esophagitis  No significant reflux now.  Given his anemia, and prior alcohol abuse he was been started on low-dose PPI.   We will recommend on a PPI when once the EGD is performed        Follow Up:   Return in about 4 weeks (around 10/20/2022).    Lucius Barragan MD  [facility] [place]  9/22/2022  18:48 EDT    Please note  that portions of this note may have been completed with a voice recognition program.

## 2022-09-23 PROBLEM — K70.31 ALCOHOLIC CIRRHOSIS OF LIVER WITH ASCITES: Status: ACTIVE | Noted: 2022-09-23

## 2022-09-23 LAB
ANA SER QL: NEGATIVE
FERRITIN SERPL-MCNC: 246 NG/ML (ref 30–400)
HAV AB SER QL IA: POSITIVE
HBV CORE AB SERPL QL IA: NEGATIVE
HBV SURFACE AB SER RIA-ACNC: NORMAL
HBV SURFACE AG SERPL QL IA: NORMAL
HCV AB SER DONR QL: NORMAL
IRON 24H UR-MRATE: 74 MCG/DL (ref 59–158)
IRON SATN MFR SERPL: 20 % (ref 20–50)
MITOCHONDRIA M2 IGG SER-ACNC: <20 UNITS (ref 0–20)
SMA IGG SER-ACNC: 11 UNITS (ref 0–19)
TIBC SERPL-MCNC: 367 MCG/DL (ref 298–536)
TRANSFERRIN SERPL-MCNC: 246 MG/DL (ref 200–360)

## 2022-09-27 ENCOUNTER — HOSPITAL ENCOUNTER (OUTPATIENT)
Facility: HOSPITAL | Age: 62
Setting detail: HOSPITAL OUTPATIENT SURGERY
Discharge: HOME | End: 2022-09-27
Attending: INTERNAL MEDICINE | Admitting: INTERNAL MEDICINE

## 2022-09-27 ENCOUNTER — ANESTHESIA (OUTPATIENT)
Dept: GASTROENTEROLOGY | Facility: HOSPITAL | Age: 62
End: 2022-09-27

## 2022-09-27 ENCOUNTER — ANESTHESIA EVENT (OUTPATIENT)
Dept: GASTROENTEROLOGY | Facility: HOSPITAL | Age: 62
End: 2022-09-27

## 2022-09-27 VITALS
RESPIRATION RATE: 16 BRPM | OXYGEN SATURATION: 98 % | DIASTOLIC BLOOD PRESSURE: 77 MMHG | TEMPERATURE: 97.1 F | HEIGHT: 68 IN | HEART RATE: 74 BPM | BODY MASS INDEX: 21.82 KG/M2 | SYSTOLIC BLOOD PRESSURE: 133 MMHG | WEIGHT: 144 LBS

## 2022-09-27 DIAGNOSIS — K70.31 ALCOHOLIC CIRRHOSIS OF LIVER WITH ASCITES: ICD-10-CM

## 2022-09-27 LAB — KOH PREP NAIL: ABNORMAL

## 2022-09-27 PROCEDURE — 88305 TISSUE EXAM BY PATHOLOGIST: CPT

## 2022-09-27 PROCEDURE — 25010000002 PROPOFOL 10 MG/ML EMULSION: Performed by: NURSE ANESTHETIST, CERTIFIED REGISTERED

## 2022-09-27 PROCEDURE — 43239 EGD BIOPSY SINGLE/MULTIPLE: CPT | Performed by: INTERNAL MEDICINE

## 2022-09-27 PROCEDURE — 87220 TISSUE EXAM FOR FUNGI: CPT | Performed by: INTERNAL MEDICINE

## 2022-09-27 RX ORDER — KETAMINE HYDROCHLORIDE 50 MG/ML
INJECTION, SOLUTION, CONCENTRATE INTRAMUSCULAR; INTRAVENOUS AS NEEDED
Status: DISCONTINUED | OUTPATIENT
Start: 2022-09-27 | End: 2022-09-27 | Stop reason: SURG

## 2022-09-27 RX ORDER — FLUCONAZOLE 200 MG/1
200 TABLET ORAL DAILY
Qty: 14 TABLET | Refills: 0 | Status: SHIPPED | OUTPATIENT
Start: 2022-09-27 | End: 2022-10-12

## 2022-09-27 RX ORDER — SODIUM CHLORIDE 9 MG/ML
70 INJECTION, SOLUTION INTRAVENOUS CONTINUOUS PRN
Status: DISCONTINUED | OUTPATIENT
Start: 2022-09-27 | End: 2022-09-27 | Stop reason: HOSPADM

## 2022-09-27 RX ORDER — LIDOCAINE HYDROCHLORIDE 20 MG/ML
INJECTION, SOLUTION INTRAVENOUS AS NEEDED
Status: DISCONTINUED | OUTPATIENT
Start: 2022-09-27 | End: 2022-09-27 | Stop reason: SURG

## 2022-09-27 RX ORDER — SIMETHICONE 20 MG/.3ML
EMULSION ORAL AS NEEDED
Status: DISCONTINUED | OUTPATIENT
Start: 2022-09-27 | End: 2022-09-27 | Stop reason: HOSPADM

## 2022-09-27 RX ORDER — PROPOFOL 10 MG/ML
VIAL (ML) INTRAVENOUS AS NEEDED
Status: DISCONTINUED | OUTPATIENT
Start: 2022-09-27 | End: 2022-09-27 | Stop reason: SURG

## 2022-09-27 RX ORDER — ONDANSETRON 2 MG/ML
4 INJECTION INTRAMUSCULAR; INTRAVENOUS ONCE AS NEEDED
Status: CANCELLED | OUTPATIENT
Start: 2022-09-27 | End: 2022-09-27

## 2022-09-27 RX ADMIN — LIDOCAINE HYDROCHLORIDE 100 MG: 20 INJECTION, SOLUTION INTRAVENOUS at 08:33

## 2022-09-27 RX ADMIN — PROPOFOL 200 MG: 10 INJECTION, EMULSION INTRAVENOUS at 08:33

## 2022-09-27 RX ADMIN — KETAMINE HYDROCHLORIDE 25 MG: 50 INJECTION, SOLUTION INTRAMUSCULAR; INTRAVENOUS at 08:33

## 2022-09-27 RX ADMIN — SODIUM CHLORIDE 70 ML/HR: 9 INJECTION, SOLUTION INTRAVENOUS at 06:51

## 2022-09-27 NOTE — ANESTHESIA PREPROCEDURE EVALUATION
Anesthesia Evaluation     Patient summary reviewed and Nursing notes reviewed   no history of anesthetic complications:  NPO Solid Status: > 8 hours  NPO Liquid Status: > 8 hours           Airway   Mallampati: II  TM distance: >3 FB  Neck ROM: full  no difficulty expected  Dental - normal exam     Pulmonary - normal exam   (+) a smoker Current Smoked day of surgery, COPD, sleep apnea,   Cardiovascular - normal exam    Rhythm: regular  Rate: normal    (+) hypertension 2 medications or greater, past MI , CAD, hyperlipidemia,       Neuro/Psych  (+) psychiatric history Anxiety and Depression,    GI/Hepatic/Renal/Endo    (+)   liver disease, renal disease CRI,     Musculoskeletal     (+) back pain,   Abdominal    Substance History   (+) alcohol use,      OB/GYN negative ob/gyn ROS         Other - negative ROS                     Anesthesia Plan    ASA 3     MAC     (Pt told that intravenous sedation will be used as the primary anesthetic along with local anesthesia if necessary. Every effort will be made to make sure the patient is comfortable.     The patient was told they may or may not have recall for the procedure. It was further explained that if the MAC was not adequate that a general anesthetic with either an LMA or endotracheal tube would be required.     Will proceed with the plan of care.)  intravenous induction     Anesthetic plan, risks, benefits, and alternatives have been provided, discussed and informed consent has been obtained with: patient.        CODE STATUS:

## 2022-09-27 NOTE — ANESTHESIA POSTPROCEDURE EVALUATION
Patient: Gregor Fish Jr.    Procedure Summary     Date: 09/27/22 Room / Location: [facility] 2 / [facility]    Anesthesia Start: 0829 Anesthesia Stop: 0845    Procedure: ESOPHAGOGASTRODUODENOSCOPY WITH BANDING (N/A ) Diagnosis:       Alcoholic cirrhosis of liver with ascites (HCC)      (Alcoholic cirrhosis of liver with ascites (HCC) [K70.31])    Surgeons: Lucius Barragan MD Provider: Leo Borja CRNA    Anesthesia Type: MAC ASA Status: 3          Anesthesia Type: MAC    Vitals  Vitals Value Taken Time   /94 09/27/22 0917   Temp 97.1 °F (36.2 °C) 09/27/22 0917   Pulse 74 09/27/22 0917   Resp 16 09/27/22 0917   SpO2 98 % 09/27/22 0917           Post Anesthesia Care and Evaluation    Patient location during evaluation: PHASE II  Patient participation: complete - patient participated  Level of consciousness: awake  Pain score: 1  Pain management: adequate    Airway patency: patent  Anesthetic complications: No anesthetic complications  PONV Status: controlled  Cardiovascular status: acceptable and stable  Respiratory status: acceptable  Hydration status: acceptable

## 2022-09-28 LAB — REF LAB TEST METHOD: NORMAL

## 2022-10-06 ENCOUNTER — HOSPITAL ENCOUNTER (OUTPATIENT)
Dept: ULTRASOUND IMAGING | Facility: HOSPITAL | Age: 62
Discharge: HOME | End: 2022-10-06
Admitting: INTERNAL MEDICINE

## 2022-10-06 DIAGNOSIS — K70.31 ALCOHOLIC CIRRHOSIS OF LIVER WITH ASCITES: ICD-10-CM

## 2022-10-06 PROCEDURE — 76700 US EXAM ABDOM COMPLETE: CPT

## 2022-10-12 ENCOUNTER — HOSPITAL ENCOUNTER (OUTPATIENT)
Dept: CARDIOLOGY | Facility: HOSPITAL | Age: 62
Discharge: HOME | End: 2022-10-12
Admitting: INTERNAL MEDICINE

## 2022-10-12 ENCOUNTER — OFFICE VISIT (OUTPATIENT)
Dept: CARDIOLOGY | Facility: CLINIC | Age: 62
End: 2022-10-12

## 2022-10-12 VITALS
HEART RATE: 81 BPM | WEIGHT: 142.4 LBS | BODY MASS INDEX: 21.58 KG/M2 | OXYGEN SATURATION: 97 % | HEIGHT: 68 IN | SYSTOLIC BLOOD PRESSURE: 100 MMHG | DIASTOLIC BLOOD PRESSURE: 62 MMHG

## 2022-10-12 VITALS — WEIGHT: 142.42 LBS | HEIGHT: 68 IN | BODY MASS INDEX: 21.58 KG/M2

## 2022-10-12 DIAGNOSIS — R01.1 HEART MURMUR: ICD-10-CM

## 2022-10-12 DIAGNOSIS — R60.0 LOWER EXTREMITY EDEMA: Primary | ICD-10-CM

## 2022-10-12 DIAGNOSIS — R60.0 LOWER EXTREMITY EDEMA: ICD-10-CM

## 2022-10-12 DIAGNOSIS — R01.0 FUNCTIONAL MURMUR: ICD-10-CM

## 2022-10-12 DIAGNOSIS — K70.31 ALCOHOLIC CIRRHOSIS OF LIVER WITH ASCITES: ICD-10-CM

## 2022-10-12 PROBLEM — E78.2 MIXED HYPERLIPIDEMIA: Status: RESOLVED | Noted: 2017-07-13 | Resolved: 2022-10-12

## 2022-10-12 PROBLEM — F10.239 ALCOHOL DEPENDENCE WITH WITHDRAWAL: Status: RESOLVED | Noted: 2018-02-05 | Resolved: 2022-10-12

## 2022-10-12 PROBLEM — I25.10 CORONARY ARTERY DISEASE INVOLVING NATIVE CORONARY ARTERY OF NATIVE HEART WITHOUT ANGINA PECTORIS: Status: RESOLVED | Noted: 2017-07-13 | Resolved: 2022-10-12

## 2022-10-12 PROBLEM — R07.89 ATYPICAL CHEST PAIN: Status: ACTIVE | Noted: 2022-10-12

## 2022-10-12 PROBLEM — K52.9 COLITIS: Status: RESOLVED | Noted: 2022-07-16 | Resolved: 2022-10-12

## 2022-10-12 PROBLEM — I10 ESSENTIAL HYPERTENSION: Status: RESOLVED | Noted: 2017-07-13 | Resolved: 2022-10-12

## 2022-10-12 PROCEDURE — 93000 ELECTROCARDIOGRAM COMPLETE: CPT | Performed by: INTERNAL MEDICINE

## 2022-10-12 PROCEDURE — 93306 TTE W/DOPPLER COMPLETE: CPT

## 2022-10-12 PROCEDURE — 93306 TTE W/DOPPLER COMPLETE: CPT | Performed by: INTERNAL MEDICINE

## 2022-10-12 PROCEDURE — 99204 OFFICE O/P NEW MOD 45 MIN: CPT | Performed by: INTERNAL MEDICINE

## 2022-10-12 RX ORDER — FUROSEMIDE 40 MG/1
40 TABLET ORAL DAILY
Qty: 30 TABLET | Refills: 5 | Status: SHIPPED | OUTPATIENT
Start: 2022-10-12

## 2022-10-12 RX ORDER — POTASSIUM CHLORIDE 750 MG/1
10 TABLET, FILM COATED, EXTENDED RELEASE ORAL DAILY
Start: 2022-10-12

## 2022-10-12 NOTE — ASSESSMENT & PLAN NOTE
· Severe swelling of the lower extremities and abdomen in the context of decompensated liver failure, presently much improved with compliance with furosemide loop diuretic therapy.  · Echo today shows normal LV function, normal RV function, no evidence of pulmonary hypertension to account for swelling  · Follow-up with GI regarding treatment for alcoholic cirrhosis of the liver  · Encourage compliance with furosemide  · Patient likely will not tolerate spironolactone due to present gynecomastia, consider eplerenone

## 2022-10-12 NOTE — PROGRESS NOTES
[facility]  Cardiology Consultation Note     Gregor Fish Jr.  1960  Requesting Provider: TAMMI Samson  PCP: Ana Lilia West APRN    ID:  Gregor Fish Jr. is a 62 y.o. male who resides in [place], [place].    REASON FOR CONSULTATION:    • Edema         Dear Ana Lilia West:    Thank you for referring treatment knees to my office today for evaluation of lower extremity swelling.  He is a 62-year-old gentleman with alcoholic cirrhosis of the liver.  Several months ago he was having severe weight gain and swelling of his abdomen and lower extremities.  He was evaluated in the [facility] emergency room and found to have ascites on CT scan.  He did been admitted to [facility] a month prior for dehydration and his diuretics have been stopped.  He has since been restarted on furosemide and his swelling has improved significantly.  He thinks his weight is dropped from 180 pounds to 142 pounds today.  He has been prescribed spironolactone but has not taken this.  He recently underwent upper endoscopy with Dr. Barragan where he was found to have possible Huynh's esophagitis, a 3 cm hiatal hernia, and a grade 1 esophageal varices.    The patient complains of very intermittent and sporadic chest pain symptoms which last only seconds in duration.  These occur without exertion and resolve quickly.    The patient is unsure about his present medications.  Spironolactone is on his med list, but he is unsure whether he is taking this.  He does have tenderness of his breast tissue and a mass on the right breast.          Past Medical History, Past Surgical History, Family history, Social History, and Medications were all reviewed with the patient today and updated as necessary.       Current Outpatient Medications:   •  atorvastatin (LIPITOR) 80 MG tablet, Take 1 tablet by mouth Every Night., Disp: , Rfl:   •  Cholecalciferol 25 MCG (1000 UT) capsule, Take 1 capsule by mouth  Daily., Disp: , Rfl:   •  furosemide (LASIX) 40 MG tablet, Take 1 tablet by mouth Daily., Disp: 30 tablet, Rfl: 5  •  gabapentin (NEURONTIN) 400 MG capsule, Take 400 mg by mouth 3 (Three) Times a Day., Disp: , Rfl:   •  lisinopril (PRINIVIL,ZESTRIL) 10 MG tablet, Take 10 mg by mouth Daily., Disp: , Rfl:   •  omeprazole (priLOSEC) 20 MG capsule, Take 20 mg by mouth Daily., Disp: , Rfl:   •  potassium chloride 10 MEQ CR tablet, Take 1 tablet by mouth Daily., Disp: , Rfl:   •  QUEtiapine (SEROquel) 200 MG tablet, Take 1 tablet by mouth every night at bedtime. 250mg total QHS, Disp: , Rfl:   •  QUEtiapine (SEROquel) 50 MG tablet, Take 1 tablet by mouth Every Night. 250mg total QHS, Disp: , Rfl:   •  vitamin B-12 (CYANOCOBALAMIN) 1000 MCG tablet, Take 1 tablet by mouth Daily., Disp: 30 tablet, Rfl: 5    No Known Allergies      Past Medical History:   Diagnosis Date   • Acute kidney failure (HCC) 1980   • Alcohol abuse    • Anemia    • Anxiety    • Back pain    • Bipolar disorder (HCC)    • Chronic kidney disease    • Cirrhosis (HCC)    • Colitis 7/16/2022   • COPD (chronic obstructive pulmonary disease) (HCC)    • Depression    • Hyperlipidemia    • Hypertension    • Hypo-osmolality and hyponatremia    • Hypomagnesemia    • Insomnia    • Myocardial infarction (HCC)    • Orthostatic hypotension    • Sleep apnea     patient denies   • Sleep trouble    • Snores    • Tattoos    • Withdrawal symptoms, alcohol (HCC)        Past Surgical History:   Procedure Laterality Date   • COLONOSCOPY     • ENDOSCOPY     • ENDOSCOPY W/ BANDING N/A 9/27/2022    Procedure: ESOPHAGOGASTRODUODENOSCOPY WITH BANDING;  Surgeon: Lucius Barragan MD;  Location: [facility] ENDOSCOPY;  Service: Gastroenterology;  Laterality: N/A;   • INGUINAL HERNIA REPAIR Bilateral    • TEETH EXTRACTION         Family History   Problem Relation Age of Onset   • Diabetes Mother    • Hypertension Mother    • Stomach cancer Father    • Heart attack Father    • No  "Known Problems Sister    • No Known Problems Brother    • Colon cancer Neg Hx    • Cirrhosis Neg Hx    • Liver cancer Neg Hx    • Liver disease Neg Hx        Social History     Tobacco Use   • Smoking status: Every Day     Packs/day: 0.50     Types: Cigarettes     Start date: 1970   • Smokeless tobacco: Never   Substance Use Topics   • Alcohol use: Not Currently     Comment: Hx heavy use:  quit x 07/2022       Review of Systems   Constitutional: Negative for malaise/fatigue.   Eyes: Negative for vision loss in left eye and vision loss in right eye.   Cardiovascular: Positive for chest pain and leg swelling. Negative for dyspnea on exertion, near-syncope, orthopnea, palpitations, paroxysmal nocturnal dyspnea and syncope.   Musculoskeletal: Negative for myalgias.   Gastrointestinal: Positive for bloating.   Neurological: Negative for brief paralysis, excessive daytime sleepiness, focal weakness, numbness, paresthesias and weakness.   All other systems reviewed and are negative.              /62 (BP Location: Right arm, Patient Position: Sitting, Cuff Size: Adult)   Pulse 81   Ht 172.7 cm (68\")   Wt 64.6 kg (142 lb 6.4 oz)   SpO2 97%   BMI 21.65 kg/m²        Constitutional:       Appearance: Healthy appearance. Well-developed.   Eyes:      General: Lids are normal. No scleral icterus.     Conjunctiva/sclera: Conjunctivae normal.   HENT:      Head: Normocephalic and atraumatic.   Neck:      Thyroid: No thyromegaly.      Vascular: No carotid bruit or JVD.   Pulmonary:      Effort: Pulmonary effort is normal.      Breath sounds: Normal breath sounds. No wheezing. No rhonchi. No rales.   Chest:       Cardiovascular:      Normal rate. Regular rhythm.      Murmurs: There is a grade 1/6 harsh midsystolic murmur at the URSB, radiating to the neck.      No gallop. No rub.   Pulses:     Intact distal pulses.   Abdominal:      General: There is no distension.      Palpations: Abdomen is soft. There is no abdominal " mass.   Musculoskeletal:      Cervical back: Normal range of motion. Skin:     General: Skin is warm and dry.      Findings: No rash.   Neurological:      General: No focal deficit present.      Mental Status: Alert and oriented to person, place, and time.      Gait: Gait is intact.   Psychiatric:         Attention and Perception: Attention normal.         Mood and Affect: Mood normal.         Behavior: Behavior normal.             ECG 12 Lead    Date/Time: 10/12/2022 4:53 PM  Performed by: Graham Leavitt IV, MD  Authorized by: Graham Leavitt IV, MD   Comparison: not compared with previous ECG   Previous ECG: no previous ECG available  Rhythm: sinus rhythm    Clinical impression: non-specific ECG          Echocardiogram performed in the office today reviewed.  LVEF 70%.  Turbulence across aortic valve with no significant stenosis related to hyperdynamic state.  No evidence of pulmonary hypertension.  No other valvular abnormality.    Lab date: 7/26/22  • FLP: TC 97, , HDL 17, LDL 59  • CMP: Glu 97, BUN 4, Creat 0.52, eGFR 115, Na 131, K 3.4, Cl 101, CO2 14, Ca 8.4, Alk Phos 103, AST 98,   • CBC: WBC 7.6 RBC 3.81, HGB 12.5, HCT 36.4, MCV 96, MCH 32.8,          Problem List Items Addressed This Visit        Cardiology Problems    Functional murmur    Overview     · Systolic murmur heard on exam, 10/12/2022  · Echo (10/12/2022): LVEF 70%.  No significant valvular abnormality.  Turbulence at aortic valve related to hyperdynamic state.  No pulmonary hypertension.            Other    Alcoholic cirrhosis of liver with ascites (HCC)    Overview     · Upper endoscopy by Dr. Barragan (9/27/2022): Possible Huynh's esophagus.  3 cm hiatal hernia.  Grade 1 esophageal varices.         Relevant Medications    furosemide (LASIX) 40 MG tablet    potassium chloride 10 MEQ CR tablet    Lower extremity edema - Primary    Overview     · Severe lower extremity swelling in the setting of  decompensated alcoholic cirrhosis of the liver, 8/2022  · Echo (10/12/2022): LVEF 70%.  No significant valvular abnormality.  No pulmonary hypertension.         Current Assessment & Plan     · Severe swelling of the lower extremities and abdomen in the context of decompensated liver failure, presently much improved with compliance with furosemide loop diuretic therapy.  · Echo today shows normal LV function, normal RV function, no evidence of pulmonary hypertension to account for swelling  · Follow-up with GI regarding treatment for alcoholic cirrhosis of the liver  · Encourage compliance with furosemide  · Patient likely will not tolerate spironolactone due to present gynecomastia, consider eplerenone         Relevant Medications    furosemide (LASIX) 40 MG tablet    potassium chloride 10 MEQ CR tablet    Other Relevant Orders    Adult Transthoracic Echo Complete W/ Cont if Necessary Per Protocol                · Encourage compliance with furosemide  · Echocardiogram (performed)  Follow-up with Dr. Barragan regarding cirrhosis  Patient advised to bring medications with him to the office appointment to ensure compliance  No cardiac follow-up needed at this time          BERHANE Leavitt MD, FACC, [facility]  Interventional Cardiology  10/12/22  16:52 EDT

## 2022-10-13 LAB
ASCENDING AORTA: 3.1 CM
BH CV ECHO MEAS - AO MAX PG: 21.5 MMHG
BH CV ECHO MEAS - AO MEAN PG: 14 MMHG
BH CV ECHO MEAS - AO ROOT DIAM: 3.4 CM
BH CV ECHO MEAS - AO V2 MAX: 232 CM/SEC
BH CV ECHO MEAS - AO V2 VTI: 48.9 CM
BH CV ECHO MEAS - AVA(I,D): 1.73 CM2
BH CV ECHO MEAS - EDV(CUBED): 48.6 ML
BH CV ECHO MEAS - EDV(MOD-SP2): 83 ML
BH CV ECHO MEAS - EDV(MOD-SP4): 128 ML
BH CV ECHO MEAS - EF(MOD-BP): 62 %
BH CV ECHO MEAS - EF(MOD-SP2): 66.3 %
BH CV ECHO MEAS - EF(MOD-SP4): 60.9 %
BH CV ECHO MEAS - ESV(CUBED): 15.4 ML
BH CV ECHO MEAS - ESV(MOD-SP2): 28 ML
BH CV ECHO MEAS - ESV(MOD-SP4): 50 ML
BH CV ECHO MEAS - FS: 31.8 %
BH CV ECHO MEAS - IVS/LVPW: 0.93 CM
BH CV ECHO MEAS - IVSD: 0.99 CM
BH CV ECHO MEAS - LA DIMENSION: 3.8 CM
BH CV ECHO MEAS - LAT PEAK E' VEL: 7.9 CM/SEC
BH CV ECHO MEAS - LV DIASTOLIC VOL/BSA (35-75): 72.4 CM2
BH CV ECHO MEAS - LV MASS(C)D: 115.4 GRAMS
BH CV ECHO MEAS - LV MAX PG: 7.8 MMHG
BH CV ECHO MEAS - LV MEAN PG: 4 MMHG
BH CV ECHO MEAS - LV SYSTOLIC VOL/BSA (12-30): 28.3 CM2
BH CV ECHO MEAS - LV V1 MAX: 140 CM/SEC
BH CV ECHO MEAS - LV V1 VTI: 26.9 CM
BH CV ECHO MEAS - LVIDD: 3.7 CM
BH CV ECHO MEAS - LVIDS: 2.49 CM
BH CV ECHO MEAS - LVOT AREA: 3.1 CM2
BH CV ECHO MEAS - LVOT DIAM: 2 CM
BH CV ECHO MEAS - LVPWD: 1.07 CM
BH CV ECHO MEAS - MED PEAK E' VEL: 5.95 CM/SEC
BH CV ECHO MEAS - MV A MAX VEL: 94.8 CM/SEC
BH CV ECHO MEAS - MV DEC SLOPE: 472 CM/SEC2
BH CV ECHO MEAS - MV DEC TIME: 0.26 MSEC
BH CV ECHO MEAS - MV E MAX VEL: 74 CM/SEC
BH CV ECHO MEAS - MV E/A: 0.78
BH CV ECHO MEAS - MV MAX PG: 5.6 MMHG
BH CV ECHO MEAS - MV MEAN PG: 2 MMHG
BH CV ECHO MEAS - MV P1/2T: 69.5 MSEC
BH CV ECHO MEAS - MV V2 VTI: 34.6 CM
BH CV ECHO MEAS - MVA(P1/2T): 3.2 CM2
BH CV ECHO MEAS - MVA(VTI): 2.44 CM2
BH CV ECHO MEAS - PA ACC SLOPE: 911 CM/SEC2
BH CV ECHO MEAS - PA ACC TIME: 0.14 SEC
BH CV ECHO MEAS - PA PR(ACCEL): 18.2 MMHG
BH CV ECHO MEAS - PA V2 MAX: 129 CM/SEC
BH CV ECHO MEAS - RAP SYSTOLE: 3 MMHG
BH CV ECHO MEAS - RVSP: 25 MMHG
BH CV ECHO MEAS - SI(MOD-SP2): 31.1 ML/M2
BH CV ECHO MEAS - SI(MOD-SP4): 44.1 ML/M2
BH CV ECHO MEAS - SV(LVOT): 84.5 ML
BH CV ECHO MEAS - SV(MOD-SP2): 55 ML
BH CV ECHO MEAS - SV(MOD-SP4): 78 ML
BH CV ECHO MEAS - TAPSE (>1.6): 1.7 CM
BH CV ECHO MEAS - TR MAX PG: 21.7 MMHG
BH CV ECHO MEAS - TR MAX VEL: 233 CM/SEC
BH CV ECHO MEASUREMENTS AVERAGE E/E' RATIO: 10.69
BH CV VAS BP LEFT ARM: NORMAL MMHG
BH CV XLRA - RV BASE: 3.3 CM
BH CV XLRA - RV LENGTH: 5.8 CM
BH CV XLRA - RV MID: 2.6 CM
BH CV XLRA - TDI S': 16.5 CM/SEC
LEFT ATRIUM VOLUME INDEX: 21.5 ML/M2
LV EF 2D ECHO EST: 75 %
MAXIMAL PREDICTED HEART RATE: 158 BPM
STRESS TARGET HR: 134 BPM

## 2022-10-20 ENCOUNTER — OFFICE VISIT (OUTPATIENT)
Dept: GASTROENTEROLOGY | Facility: CLINIC | Age: 62
End: 2022-10-20

## 2022-10-20 VITALS
RESPIRATION RATE: 20 BRPM | SYSTOLIC BLOOD PRESSURE: 90 MMHG | BODY MASS INDEX: 21.82 KG/M2 | WEIGHT: 144 LBS | TEMPERATURE: 98.4 F | DIASTOLIC BLOOD PRESSURE: 62 MMHG | HEART RATE: 81 BPM | HEIGHT: 68 IN

## 2022-10-20 DIAGNOSIS — Z86.010 H/O ADENOMATOUS POLYP OF COLON: Primary | ICD-10-CM

## 2022-10-20 DIAGNOSIS — D64.9 NORMOCYTIC ANEMIA: ICD-10-CM

## 2022-10-20 DIAGNOSIS — K70.31 ALCOHOLIC CIRRHOSIS OF LIVER WITH ASCITES: ICD-10-CM

## 2022-10-20 DIAGNOSIS — K22.70 BARRETT'S ESOPHAGUS WITHOUT DYSPLASIA: ICD-10-CM

## 2022-10-20 DIAGNOSIS — K31.89 PORTAL HYPERTENSIVE GASTROPATHY: ICD-10-CM

## 2022-10-20 DIAGNOSIS — K76.6 PORTAL HYPERTENSIVE GASTROPATHY: ICD-10-CM

## 2022-10-20 DIAGNOSIS — I85.10 SECONDARY ESOPHAGEAL VARICES WITHOUT BLEEDING: ICD-10-CM

## 2022-10-20 PROCEDURE — 99214 OFFICE O/P EST MOD 30 MIN: CPT | Performed by: INTERNAL MEDICINE

## 2022-10-20 RX ORDER — SODIUM CHLORIDE 9 MG/ML
70 INJECTION, SOLUTION INTRAVENOUS CONTINUOUS PRN
Status: CANCELLED | OUTPATIENT
Start: 2022-10-20

## 2022-10-20 RX ORDER — SPIRONOLACTONE 50 MG/1
50 TABLET, FILM COATED ORAL DAILY
Qty: 30 TABLET | Refills: 2 | Status: SHIPPED | OUTPATIENT
Start: 2022-10-20 | End: 2022-12-01 | Stop reason: SDUPTHER

## 2022-10-20 NOTE — PROGRESS NOTES
Follow Up Note     Date: 10/20/2022   Patient Name: Gregor Fish Jr.  MRN: [id number]  : 1960     Referring Physician: Ana Lilia West APRN    Chief Complaint:    Chief Complaint   Patient presents with   • Follow-up   • Cirrhosis   • Anemia       Interval History:   10/20/2022  Gregor Fish Jr. is a 62 y.o. male who is here today for follow up for his cirrhosis and anemia.  He states that he is tingling numbness improved now.  Is feeling still tired.  He was constipated recently but that has improved now.  Denies any confusional episodes.  He is here for a follow-up after his recent ED    2022  Gregor Fish is a 62 y.o. male who is here today to establish care with Gastroenterology for evaluation for recently diagnosed with cirrhosis.     Patient states that he has been having issues with the stomach in the form of diffuse abdominal discomfort abdominal distention nausea vomiting intermittently for the past 3 to 4 months.  He had a intermittent cough and diarrhea.  He also developed significant numbness in both upper extremities and the lower extremities for the last few months.  He was recently admitted in the hospital for above symptoms and a CT scan abdomen pelvis done revealed a signs of cirrhosis with ascites.  Also showed some colonic wall thickening on the right side suggesting possible colitis.  He has been discharged with the diuretics and since the diuretic was started his symptoms have improved significantly and his leg swelling and abdominal swelling is improved.     No prior history of any viral hepatitis.  He did not know that he had a liver disease.  He is a chronic alcoholic he used to drink at least fifth of hard liquor over 30 years.  He is also chronic smoker.  His dad had a stomach cancer.  No family history of any cirrhosis.  As per patient he had a colonoscopy about 3 to 5 years ago and had at least a 5 polyps removed.  No prior endoscopy.     He denies any dysphagia  symptoms, no nausea vomiting or reflux symptoms.  Prior history of anemia.  He denies any jaundice.  He has been tired and fatigued these days.  He had a diarrhea few months ago but diarrhea has stopped now since he stopped alcohol in July 2022..       Subjective      Past Medical History:   Past Medical History:   Diagnosis Date   • Acute kidney failure (HCC) 1980   • Alcohol abuse    • Anemia    • Anxiety    • Back pain    • Bipolar disorder (HCC)    • Chronic kidney disease    • Cirrhosis (HCC)    • Colitis 7/16/2022   • COPD (chronic obstructive pulmonary disease) (HCC)    • Depression    • Hyperlipidemia    • Hypertension    • Hypo-osmolality and hyponatremia    • Hypomagnesemia    • Insomnia    • Myocardial infarction (HCC)    • Orthostatic hypotension    • Sleep apnea     patient denies   • Sleep trouble    • Snores    • Tattoos    • Withdrawal symptoms, alcohol (HCC)      Past Surgical History:   Past Surgical History:   Procedure Laterality Date   • COLONOSCOPY     • ENDOSCOPY     • ENDOSCOPY W/ BANDING N/A 9/27/2022    Procedure: ESOPHAGOGASTRODUODENOSCOPY WITH BANDING;  Surgeon: Lucius Barragan MD;  Location: [facility] ENDOSCOPY;  Service: Gastroenterology;  Laterality: N/A;   • INGUINAL HERNIA REPAIR Bilateral    • TEETH EXTRACTION         Family History:   Family History   Problem Relation Age of Onset   • Diabetes Mother    • Hypertension Mother    • Stomach cancer Father    • Heart attack Father    • No Known Problems Sister    • No Known Problems Brother    • Colon cancer Neg Hx    • Cirrhosis Neg Hx    • Liver cancer Neg Hx    • Liver disease Neg Hx        Social History:   Social History     Socioeconomic History   • Marital status: Single   Tobacco Use   • Smoking status: Every Day     Packs/day: 0.50     Types: Cigarettes     Start date: 1970   • Smokeless tobacco: Never   Vaping Use   • Vaping Use: Never used   Substance and Sexual Activity   • Alcohol use: Not Currently     Comment: Hx  heavy use:  quit x 07/2022   • Drug use: Never   • Sexual activity: Defer       Medications:     Current Outpatient Medications:   •  atorvastatin (LIPITOR) 80 MG tablet, Take 1 tablet by mouth Every Night., Disp: , Rfl:   •  Cholecalciferol 25 MCG (1000 UT) capsule, Take 2 capsules by mouth Daily., Disp: , Rfl:   •  furosemide (LASIX) 40 MG tablet, Take 1 tablet by mouth Daily., Disp: 30 tablet, Rfl: 5  •  gabapentin (NEURONTIN) 400 MG capsule, Take 400 mg by mouth 3 (Three) Times a Day., Disp: , Rfl:   •  lisinopril (PRINIVIL,ZESTRIL) 10 MG tablet, Take 10 mg by mouth Daily., Disp: , Rfl:   •  omeprazole (priLOSEC) 20 MG capsule, Take 20 mg by mouth Daily., Disp: , Rfl:   •  potassium chloride 10 MEQ CR tablet, Take 1 tablet by mouth Daily., Disp: , Rfl:   •  QUEtiapine (SEROquel) 200 MG tablet, Take 1 tablet by mouth every night at bedtime. 250mg total QHS, Disp: , Rfl:   •  QUEtiapine (SEROquel) 50 MG tablet, Take 1 tablet by mouth Every Night. 250mg total QHS, Disp: , Rfl:   •  vitamin B-12 (CYANOCOBALAMIN) 1000 MCG tablet, Take 1 tablet by mouth Daily., Disp: 30 tablet, Rfl: 5    Allergies:   No Known Allergies    Review of Systems:   Review of Systems   Constitutional: Positive for appetite change and fatigue. Negative for fever and unexpected weight loss.   HENT: Negative for trouble swallowing.    Gastrointestinal: Positive for constipation. Negative for abdominal distention, abdominal pain, anal bleeding, blood in stool, diarrhea, nausea, rectal pain, vomiting, GERD and indigestion.       The following portions of the patient's history were reviewed and updated as appropriate: allergies, current medications, past family history, past medical history, past social history, past surgical history and problem list.    Objective     Physical Exam:  Vital Signs:   Vitals:    10/20/22 1436   BP: 90/62   Pulse: 81   Resp: 20   Temp: 98.4 °F (36.9 °C)   TempSrc: Infrared   Weight: 65.3 kg (144 lb)   Height: 172.7 cm  "(68\")       Physical Exam  Constitutional:       Comments: Poorly built and nourished   HENT:      Head: Normocephalic and atraumatic.   Eyes:      Conjunctiva/sclera: Conjunctivae normal.   Abdominal:      General: Abdomen is flat. There is distension (Mild abdominal distention noted no significant ascites).      Palpations: There is no mass.      Tenderness: There is no abdominal tenderness. There is no guarding or rebound.      Hernia: No hernia is present.   Musculoskeletal:      Cervical back: Normal range of motion and neck supple.   Neurological:      Mental Status: He is alert.         Results Review:   I reviewed the patient's new clinical results.    Hospital Outpatient Visit on 10/12/2022   Component Date Value Ref Range Status   • Target HR (85%) 10/12/2022 134  bpm Final   • Max. Pred. HR (100%) 10/12/2022 158  bpm Final   • EF(MOD-bp) 10/12/2022 62.0  % Final   • LVIDd 10/12/2022 3.7  cm Final   • LVIDs 10/12/2022 2.49  cm Final   • IVSd 10/12/2022 0.99  cm Final   • LVPWd 10/12/2022 1.07  cm Final   • FS 10/12/2022 31.8  % Final   • IVS/LVPW 10/12/2022 0.93  cm Final   • ESV(cubed) 10/12/2022 15.4  ml Final   • LV Sys Vol (BSA corrected) 10/12/2022 28.3  cm2 Final   • EDV(cubed) 10/12/2022 48.6  ml Final   • LV Miranda Vol (BSA corrected) 10/12/2022 72.4  cm2 Final   • LVOT area 10/12/2022 3.1  cm2 Final   • LV mass(C)d 10/12/2022 115.4  grams Final   • LVOT diam 10/12/2022 2.00  cm Final   • EDV(MOD-sp2) 10/12/2022 83.0  ml Final   • EDV(MOD-sp4) 10/12/2022 128.0  ml Final   • ESV(MOD-sp2) 10/12/2022 28.0  ml Final   • ESV(MOD-sp4) 10/12/2022 50.0  ml Final   • SV(MOD-sp2) 10/12/2022 55.0  ml Final   • SV(MOD-sp4) 10/12/2022 78.0  ml Final   • SI(MOD-sp2) 10/12/2022 31.1  ml/m2 Final   • SI(MOD-sp4) 10/12/2022 44.1  ml/m2 Final   • EF(MOD-sp2) 10/12/2022 66.3  % Final   • EF(MOD-sp4) 10/12/2022 60.9  % Final   • MV E max sherrell 10/12/2022 74.0  cm/sec Final   • MV A max sherrell 10/12/2022 94.8  cm/sec Final "   • MV dec time 10/12/2022 0.26  msec Final   • MV E/A 10/12/2022 0.78   Final   • LA ESV Index (BP) 10/12/2022 21.5  ml/m2 Final   • SV(LVOT) 10/12/2022 84.5  ml Final   • LA dimension (2D)  10/12/2022 3.8  cm Final   • LV V1 max 10/12/2022 140.0  cm/sec Final   • LV V1 max PG 10/12/2022 7.8  mmHg Final   • LV V1 mean PG 10/12/2022 4.0  mmHg Final   • LV V1 VTI 10/12/2022 26.9  cm Final   • Ao pk graham 10/12/2022 232.0  cm/sec Final   • Ao max PG 10/12/2022 21.5  mmHg Final   • Ao mean PG 10/12/2022 14.0  mmHg Final   • Ao V2 VTI 10/12/2022 48.9  cm Final   • ALANNA(I,D) 10/12/2022 1.73  cm2 Final   • MV max PG 10/12/2022 5.6  mmHg Final   • MV mean PG 10/12/2022 2.00  mmHg Final   • MV V2 VTI 10/12/2022 34.6  cm Final   • MV P1/2t 10/12/2022 69.5  msec Final   • MVA(P1/2t) 10/12/2022 3.2  cm2 Final   • MVA(VTI) 10/12/2022 2.44  cm2 Final   • MV dec slope 10/12/2022 472.0  cm/sec2 Final   • TR max graham 10/12/2022 233.0  cm/sec Final   • TR max PG 10/12/2022 21.7  mmHg Final   • PA V2 max 10/12/2022 129.0  cm/sec Final   • PA acc slope 10/12/2022 911.0  cm/sec2 Final   • PA acc time 10/12/2022 0.14  sec Final   • PA pr(Accel) 10/12/2022 18.2  mmHg Final   • Ao root diam 10/12/2022 3.4  cm Final   • BH CV VAS BP LEFT ARM 10/12/2022 100/62  mmHg Final   • Med Peak E' Graham 10/12/2022 5.95  cm/sec Final   • Lat Peak E' Graham 10/12/2022 7.9  cm/sec Final   • Avg E/e' ratio 10/12/2022 10.69   Final   • RV Base 10/12/2022 3.30  cm Final   • RV Mid 10/12/2022 2.60  cm Final   • RV Length 10/12/2022 5.80  cm Final   • TAPSE (>1.6) 10/12/2022 1.70  cm Final   • RV S' 10/12/2022 16.50  cm/sec Final   • RAP systole 10/12/2022 3  mmHg Final   • RVSP(TR) 10/12/2022 25  mmHg Final   • Ascending aorta 10/12/2022 3.1  cm Final   • Echo EF Estimated 10/12/2022 75  % Final   Admission on 09/27/2022, Discharged on 09/27/2022   Component Date Value Ref Range Status   • Reference Lab Report 09/27/2022    Final                    Value:[facility]  [address] [place] [address]  Phone: [phone] or [phone]  Fax: [phone]  Avi Kearney M.D., Medical Director    PATIENT NAME                           LABORATORY NO.  ANIYAH CASEY JR                       [id number]  [id number]                         AGE              SEX  SSN           CLIENT REF #  [facility]            62      1960      [id number]   [id number]    [address]                REQUESTING M.D.     ATTENDING M.D.     COPY TO.   [address]                        LAURA REYES GLORIA  [place] [phone]                 [facility]  DATE COLLECTED      DATE RECEIVED      DATE REPORTED  2022    DIAGNOSIS:  A.   DUODENUM, BIOPSIES:  Benign duodenal mucosa with no diagnostic abnormality  B.   ANTRUM AND BODY BIOPSIES:  Reactive gastropathy  C.   DISTAL ESOPHAGUS, BIOPSIES:  Compatible with Huynh's                           esophagus  Negative for dysplasia    FLP    CLINICAL HISTORY:  Alcoholic cirrhosis of liver with ascites    SPECIMENS RECEIVED:  A.  DUODENUM, BIOPSIES  B.  ANTRUM AND BODY BIOPSIES  C.  DISTAL ESOPHAGUS, BIOPSIES    MICROSCOPIC DESCRIPTION:  Sections of the first specimen labeled duodenum show small bowel mucosa with  and without Brunner's glands exhibiting a normal villous architecture and a  normal crypt to villous ratio.  The lamina propria exhibits the normal  complement of mononuclear cells.  There is no increase in intraepithelial  lymphocytes and the collagen table is of normal thickness.  No granulomas or  parasites are seen.  There is no evidence of dysplasia or carcinoma.    Sections of the second specimen labeled gastric antrum and body show antral  junctional and fundic type gastric mucosa with an expansion of the lamina  propria by fibrous and fibromuscular stroma with fascicles of smooth muscle  extending into the  "superficial lamina propria with associated reactive                           epithelial  changes including mucin depletion and elongation and increased tortuosity and  branching of superficial gastric pits.  In the background there is a minimal  mononuclear cell infiltrate.  No activity is seen.  No Helicobacter is identified.  There is no evidence of intestinal metaplasia, dysplasia or carcinoma.    Sections of the third specimen labeled distal esophagus for Huynh's type mucosa  show squamogastric junctional and cardia type gastric mucosa.  The squamous  epithelium shows reactive changes including an basal zone hyperplasia and  surface parakeratosis.  The squamous epithelium focally overlies the gastric  mucosa.  The gastric mucosa shows a chronic inflammatory cell infiltrate  including plasma cells.  The gastric epithelium shows extensive intestinal  metaplasia.  Focally this metaplastic epithelium underlies the squamous  epithelium.  There is no evidence of dysplasia or carcinoma.    Professional interpretation rendered by MASOOD Vee Jr., M.D.,                           FSTEFANIA.A.P. at  [facility], [employer], [address] [place] [address].    GROSS DESCRIPTION:  A.  The specimen is received in 1 formalin filled container labeled \"duodenum  biopsy\" and consists of 3 pieces of tan soft tissue measuring 0.5 x 0.4 x 0.2  cm in aggregate.  The specimen is submitted entirely in 1 cassette.  GERMAN  B.  Specimen is received in 1 formalin filled container labeled \"gastric antrum  body\" and consists of multiple pieces of tan soft tissue measuring 0.4 x 0.4  x 0.2 cm in aggregate.  The specimen is submitted entirely in 1 cassette.  C.  Specimen is received in 1 formalin filled container labeled \"distal  esophagus\" and consists of 2 pieces of tan soft tissue measuring 0.3 x 0.3 x  0.2 cm in aggregate.  The specimen is submitted entirely in 1 cassette.    REVIEWED, DIAGNOSED AND ELECTRONICALLY  SIGNED BY:    MASOOD Stokes " Jr. Vee M.D., F.C.A.P.  CPT CODES:  88305x3     • KOH Prep 09/27/2022 Yeast seen (A)  No yeast or hyphal elements seen Final   Lab on 09/22/2022   Component Date Value Ref Range Status   • VAL Direct 09/22/2022 Negative  Negative Final   • WBC 09/22/2022 6.57  3.40 - 10.80 10*3/mm3 Final   • RBC 09/22/2022 3.75 (L)  4.14 - 5.80 10*6/mm3 Final   • Hemoglobin 09/22/2022 12.1 (L)  13.0 - 17.7 g/dL Final   • Hematocrit 09/22/2022 34.7 (L)  37.5 - 51.0 % Final   • MCV 09/22/2022 92.5  79.0 - 97.0 fL Final   • MCH 09/22/2022 32.3  26.6 - 33.0 pg Final   • MCHC 09/22/2022 34.9  31.5 - 35.7 g/dL Final   • RDW 09/22/2022 12.9  12.3 - 15.4 % Final   • RDW-SD 09/22/2022 43.9  37.0 - 54.0 fl Final   • MPV 09/22/2022 10.3  6.0 - 12.0 fL Final   • Platelets 09/22/2022 218  140 - 450 10*3/mm3 Final   • Neutrophil % 09/22/2022 57.7  42.7 - 76.0 % Final   • Lymphocyte % 09/22/2022 25.9  19.6 - 45.3 % Final   • Monocyte % 09/22/2022 12.2 (H)  5.0 - 12.0 % Final   • Eosinophil % 09/22/2022 3.3  0.3 - 6.2 % Final   • Basophil % 09/22/2022 0.6  0.0 - 1.5 % Final   • Immature Grans % 09/22/2022 0.3  0.0 - 0.5 % Final   • Neutrophils, Absolute 09/22/2022 3.79  1.70 - 7.00 10*3/mm3 Final   • Lymphocytes, Absolute 09/22/2022 1.70  0.70 - 3.10 10*3/mm3 Final   • Monocytes, Absolute 09/22/2022 0.80  0.10 - 0.90 10*3/mm3 Final   • Eosinophils, Absolute 09/22/2022 0.22  0.00 - 0.40 10*3/mm3 Final   • Basophils, Absolute 09/22/2022 0.04  0.00 - 0.20 10*3/mm3 Final   • Immature Grans, Absolute 09/22/2022 0.02  0.00 - 0.05 10*3/mm3 Final   • nRBC 09/22/2022 0.0  0.0 - 0.2 /100 WBC Final   • Hep A Total Ab 09/22/2022 Positive (A)  Negative Final   • Hep B Core Total Ab 09/22/2022 Negative  Negative Final   • Hep B S Ab 09/22/2022 Non-Reactive  Non-Reactive Final   • Hepatitis B Surface Ag 09/22/2022 Non-Reactive  Non-Reactive Final   • Hepatitis C Ab 09/22/2022 Non-Reactive  Non-Reactive Final   • Iron 09/22/2022 74  59 - 158 mcg/dL Final    • Iron Saturation 09/22/2022 20  20 - 50 % Final   • Transferrin 09/22/2022 246  200 - 360 mg/dL Final   • TIBC 09/22/2022 367  298 - 536 mcg/dL Final   • Ferritin 09/22/2022 246.00  30.00 - 400.00 ng/mL Final   • Mitochondrial Ab 09/22/2022 <20.0  0.0 - 20.0 Units Final                                    Negative    0.0 - 20.0                                  Equivocal  20.1 - 24.9                                  Positive         >24.9  Mitochondrial (M2) Antibodies are found in 90-96% of  patients with primary biliary cirrhosis.   • Protime 09/22/2022 16.0 (H)  12.5 - 14.5 Seconds Final   • INR 09/22/2022 1.24 (H)  0.90 - 1.10 Final   • Smooth Muscle Ab 09/22/2022 11  0 - 19 Units Final                     Negative                     0 - 19                   Weak positive               20 - 30                   Moderate to strong positive     >30   Actin Antibodies are found in 52-85% of patients with   autoimmune hepatitis or chronic active hepatitis and   in 22% of patients with primary biliary cirrhosis.   Admission on 08/15/2022, Discharged on 08/16/2022   Component Date Value Ref Range Status   • Glucose 08/15/2022 98  65 - 99 mg/dL Final   • BUN 08/15/2022 9  8 - 23 mg/dL Final   • Creatinine 08/15/2022 0.61 (L)  0.76 - 1.27 mg/dL Final   • Sodium 08/15/2022 128 (L)  136 - 145 mmol/L Final   • Potassium 08/15/2022 3.4 (L)  3.5 - 5.2 mmol/L Final   • Chloride 08/15/2022 95 (L)  98 - 107 mmol/L Final   • CO2 08/15/2022 24.5  22.0 - 29.0 mmol/L Final   • Calcium 08/15/2022 8.8  8.6 - 10.5 mg/dL Final   • Total Protein 08/15/2022 6.1  6.0 - 8.5 g/dL Final   • Albumin 08/15/2022 3.10 (L)  3.50 - 5.20 g/dL Final   • ALT (SGPT) 08/15/2022 32  1 - 41 U/L Final   • AST (SGOT) 08/15/2022 77 (H)  1 - 40 U/L Final   • Alkaline Phosphatase 08/15/2022 141 (H)  39 - 117 U/L Final   • Total Bilirubin 08/15/2022 0.7  0.0 - 1.2 mg/dL Final   • Globulin 08/15/2022 3.0  gm/dL Final   • A/G Ratio 08/15/2022 1.0  g/dL Final    • BUN/Creatinine Ratio 08/15/2022 14.8  7.0 - 25.0 Final   • Anion Gap 08/15/2022 8.5  5.0 - 15.0 mmol/L Final   • eGFR 08/15/2022 108.6  >60.0 mL/min/1.73 Final    National Kidney Foundation and American Society of Nephrology (ASN) Task Force recommended calculation based on the Chronic Kidney Disease Epidemiology Collaboration (CKD-EPI) equation refit without adjustment for race.   • Lipase 08/15/2022 50  13 - 60 U/L Final   • Ethanol 08/15/2022 <10  0 - 10 mg/dL Final   • Ethanol % 08/15/2022 <0.010  % Final   • Ammonia 08/15/2022 29  16 - 60 umol/L Final   • Magnesium 08/15/2022 1.8  1.6 - 2.4 mg/dL Final   • proBNP 08/15/2022 515.9  0.0 - 900.0 pg/mL Final   • WBC 08/15/2022 8.09  3.40 - 10.80 10*3/mm3 Final   • RBC 08/15/2022 3.89 (L)  4.14 - 5.80 10*6/mm3 Final   • Hemoglobin 08/15/2022 12.7 (L)  13.0 - 17.7 g/dL Final   • Hematocrit 08/15/2022 36.0 (L)  37.5 - 51.0 % Final   • MCV 08/15/2022 92.5  79.0 - 97.0 fL Final   • MCH 08/15/2022 32.6  26.6 - 33.0 pg Final   • MCHC 08/15/2022 35.3  31.5 - 35.7 g/dL Final   • RDW 08/15/2022 14.2  12.3 - 15.4 % Final   • RDW-SD 08/15/2022 47.9  37.0 - 54.0 fl Final   • MPV 08/15/2022 9.1  6.0 - 12.0 fL Final   • Platelets 08/15/2022 221  140 - 450 10*3/mm3 Final   • Neutrophil % 08/15/2022 63.5  42.7 - 76.0 % Final   • Lymphocyte % 08/15/2022 21.3  19.6 - 45.3 % Final   • Monocyte % 08/15/2022 11.6  5.0 - 12.0 % Final   • Eosinophil % 08/15/2022 2.7  0.3 - 6.2 % Final   • Basophil % 08/15/2022 0.5  0.0 - 1.5 % Final   • Immature Grans % 08/15/2022 0.4  0.0 - 0.5 % Final   • Neutrophils, Absolute 08/15/2022 5.14  1.70 - 7.00 10*3/mm3 Final   • Lymphocytes, Absolute 08/15/2022 1.72  0.70 - 3.10 10*3/mm3 Final   • Monocytes, Absolute 08/15/2022 0.94 (H)  0.10 - 0.90 10*3/mm3 Final   • Eosinophils, Absolute 08/15/2022 0.22  0.00 - 0.40 10*3/mm3 Final   • Basophils, Absolute 08/15/2022 0.04  0.00 - 0.20 10*3/mm3 Final   • Immature Grans, Absolute 08/15/2022 0.03  0.00 -  0.05 10*3/mm3 Final   • nRBC 08/15/2022 0.0  0.0 - 0.2 /100 WBC Final      CT Abdomen Pelvis Without Contrast    Result Date: 8/15/2022  Ascites on the basis of cirrhosis but convincing evidence of portal hypertension is not seen. Authenticated and Electronically Signed by Joey Dos Santos M.D. on 08/15/2022 11:01:20 PM    US Abdomen Complete    Result Date: 10/20/2022  Coarsening of the hepatic echotexture can be seen with cirrhosis.  Questionable gallbladder polyps. No well-defined gallstones.      Images were reviewed, interpreted, and dictated by Dr. Azam Garcia MD Transcribed by Arelis Bhatia PA-C.  This report was signed and finalized on 10/20/2022 7:24 AM by Azam Garcia MD.    XR Chest 1 View    Result Date: 8/16/2022  Minimal bilateral pleural effusions with mild atelectasis.  This report was signed and finalized on 8/16/2022 8:23 AM by Jared Taveras MD.    EGD 9/27/2022  - Normal oropharynx.  - Z-line irregular, 34 cm from the incisors.  - Esophageal mucosal changes suspicious for short-segment Cade's esophagus. Biopsied.  - 3 cm hiatal hernia.  - Portal hypertensive gastropathy.  - Erosive gastropathy with stigmata of recent bleeding. Biopsied.  - Normal duodenal bulb, first portion of the duodenum, second portion of the duodenum and third portion of the  duodenum. Biopsied.  - Single column of Grade I and small (< 5 mm) esophageal varices.  - Candidiasis esophagitis with no bleeding.    Path; cade's  Candida    Assessment / Plan      1. Decompensated alcoholic cirrhosis of liver with ascites (HCC); MELD; MELD 9 (8/2022)  2. Alcoholic peripheral neuropathy (HCC)  3. Normocytic anemia  4.  Secondary esophageal varices without bleed  5.  Portal hypertensive gastropathy  6.  History of esophageal candidiasis  7.  Alcohol induced peripheral neuropathy  10/20/2022  EGD done on 9/27/2020 revealed 3 columns of short segment Cade's esophagus.  He had a grade 1 esophageal varices and mild portal  hypertensive gastropathy  He is immune to appetite is a but not immune to hepatitis B.  Hep C antibody is negative.  Iron studies negative for hemochromatosis.  VAL, anti-smooth muscle antibody, antimitochondrial antibody negative.  Recent echocardiogram done reveals normal ejection fraction without any cardiomyopathy.  Ultrasound abdomen done on 10/6/2022 revealed a coarsening of hepatic echotexture consistent with cirrhosis questionable gallbladder polyp otherwise no significant ascites    We will continue his Lasix 20 mg along with Aldactone 50 mg p.o. daily  His blood pressure is low with a systolic running around 90.  We will avoid nonselective beta-blockers for now and possibly consider later point once his ascites clears and blood pressure improves.  Repeat EGD in 2 years time in September 2024  He needs hepatitis B vaccine with booster at PCPs office or health department.  Continue OTC multivitamins and B12 .  Ultrasound abdomen for HCC surveillance in April 2023.    9/22/2022  Patient history is consistent with alcohol induced liver disease with decompensated alcoholic cirrhosis with ascites.  He stopped drinking alcohol since July 2022. We will also get a vitamin B12 level.  Advised to take multivitamins p.o. daily along with vitamin B12 1000 mcg prescription given.  Anemia appears to be secondary to cirrhosis.  With a significant alcohol abuse intermittent upper GI bleed suspected. His recent CT abdomen pelvis reviewed and findings are more suggestive of either viral colitis or colonic wall thickening secondary to ascites and cirrhosis.       8. Personal history of colonic polyps  As per patient he had a colonoscopy done over 3 years ago and had a 5 polyps removed.  We will schedule him for colonoscopy now.    The indications, technique, alternatives and potential risk and complications were discussed with the patient including but not limited to bleeding, bowel perforations, missing lesions and  anesthetic complications. The patient understands and wishes to proceed with the procedure and has given their verbal consent. Written patient education information was given to the patient.   The patient will call if they have further questions before procedure.     9.  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 we will continue his low-dose PPI for Huynh's esophagus         Follow Up:   No follow-ups on file.    Lucius Barragan MD  [facility] [place]  10/20/2022  14:38 EDT     Please note that portions of this note may have been completed with a voice recognition program.

## 2022-12-01 RX ORDER — SPIRONOLACTONE 50 MG/1
50 TABLET, FILM COATED ORAL DAILY
Qty: 90 TABLET | Refills: 0 | Status: SHIPPED | OUTPATIENT
Start: 2022-12-01

## 2023-02-02 ENCOUNTER — OFFICE VISIT (OUTPATIENT)
Dept: GASTROENTEROLOGY | Facility: CLINIC | Age: 63
End: 2023-02-02
Payer: OTHER GOVERNMENT

## 2023-02-02 VITALS
WEIGHT: 153 LBS | SYSTOLIC BLOOD PRESSURE: 95 MMHG | RESPIRATION RATE: 20 BRPM | DIASTOLIC BLOOD PRESSURE: 63 MMHG | HEIGHT: 69 IN | HEART RATE: 75 BPM | BODY MASS INDEX: 22.66 KG/M2 | TEMPERATURE: 98.6 F

## 2023-02-02 DIAGNOSIS — Z87.19 HISTORY OF BARRETT'S ESOPHAGUS: ICD-10-CM

## 2023-02-02 DIAGNOSIS — Z86.2 HISTORY OF ANEMIA: ICD-10-CM

## 2023-02-02 DIAGNOSIS — Z86.010 PERSONAL HISTORY OF COLONIC POLYPS: ICD-10-CM

## 2023-02-02 DIAGNOSIS — K70.31 ALCOHOLIC CIRRHOSIS OF LIVER WITH ASCITES: Primary | ICD-10-CM

## 2023-02-02 PROCEDURE — 99214 OFFICE O/P EST MOD 30 MIN: CPT | Performed by: INTERNAL MEDICINE

## 2023-02-02 RX ORDER — SODIUM, POTASSIUM,MAG SULFATES 17.5-3.13G
2 SOLUTION, RECONSTITUTED, ORAL ORAL ONCE
Qty: 354 ML | Refills: 0 | Status: SHIPPED | OUTPATIENT
Start: 2023-02-02 | End: 2023-02-03

## 2023-02-02 RX ORDER — SODIUM CHLORIDE 9 MG/ML
70 INJECTION, SOLUTION INTRAVENOUS CONTINUOUS PRN
Status: CANCELLED | OUTPATIENT
Start: 2023-02-02

## 2023-02-02 RX ORDER — PANTOPRAZOLE SODIUM 40 MG/1
40 TABLET, DELAYED RELEASE ORAL DAILY
Qty: 90 TABLET | Refills: 3 | Status: SHIPPED | OUTPATIENT
Start: 2023-02-02 | End: 2023-03-04

## 2023-02-02 RX ORDER — VITAMIN B COMPLEX
CAPSULE ORAL DAILY
COMMUNITY

## 2023-02-02 NOTE — PROGRESS NOTES
Follow Up Note     Date: 2023   Patient Name: Gregor Fish Jr.  MRN: [id number]  : 1960     Referring Physician: Ana Lilia West APRN    Chief Complaint:  No chief complaint on file.      Interval History:   2023  Gregor Fish Jr. is a 62 y.o. male who is here today for follow up for ***    10/20/2022  Gregor Fish Jr. is a 62 y.o. male who is here today for follow up for his cirrhosis and anemia.  He states that he is tingling numbness improved now.  Is feeling still tired.  He was constipated recently but that has improved now.  Denies any confusional episodes.  He is here for a follow-up after his recent ED     2022  Gregor Fish is a 62 y.o. male who is here today to establish care with Gastroenterology for evaluation for recently diagnosed with cirrhosis.     Patient states that he has been having issues with the stomach in the form of diffuse abdominal discomfort abdominal distention nausea vomiting intermittently for the past 3 to 4 months.  He had a intermittent cough and diarrhea.  He also developed significant numbness in both upper extremities and the lower extremities for the last few months.  He was recently admitted in the hospital for above symptoms and a CT scan abdomen pelvis done revealed a signs of cirrhosis with ascites.  Also showed some colonic wall thickening on the right side suggesting possible colitis.  He has been discharged with the diuretics and since the diuretic was started his symptoms have improved significantly and his leg swelling and abdominal swelling is improved.     No prior history of any viral hepatitis.  He did not know that he had a liver disease.  He is a chronic alcoholic he used to drink at least fifth of hard liquor over 30 years.  He is also chronic smoker.  His dad had a stomach cancer.  No family history of any cirrhosis.  As per patient he had a colonoscopy about 3 to 5 years ago and had at least a 5 polyps removed.  No prior  endoscopy.     He denies any dysphagia symptoms, no nausea vomiting or reflux symptoms.  Prior history of anemia.  He denies any jaundice.  He has been tired and fatigued these days.  He had a diarrhea few months ago but diarrhea has stopped now since he stopped alcohol in July 2022..    Subjective      Past Medical History:   Past Medical History:   Diagnosis Date   • Acute kidney failure (HCC) 1980   • Alcohol abuse    • Anemia    • Anxiety    • Back pain    • Bipolar disorder (HCC)    • Chronic kidney disease    • Cirrhosis (HCC)    • Colitis 7/16/2022   • COPD (chronic obstructive pulmonary disease) (HCC)    • Depression    • Hyperlipidemia    • Hypertension    • Hypo-osmolality and hyponatremia    • Hypomagnesemia    • Insomnia    • Myocardial infarction (HCC)    • Orthostatic hypotension    • Sleep apnea     patient denies   • Sleep trouble    • Snores    • Tattoos    • Withdrawal symptoms, alcohol (HCC)      Past Surgical History:   Past Surgical History:   Procedure Laterality Date   • COLONOSCOPY     • ENDOSCOPY     • ENDOSCOPY W/ BANDING N/A 9/27/2022    Procedure: ESOPHAGOGASTRODUODENOSCOPY WITH BANDING;  Surgeon: Lucius Barragan MD;  Location: [facility];  Service: Gastroenterology;  Laterality: N/A;   • INGUINAL HERNIA REPAIR Bilateral    • TEETH EXTRACTION         Family History:   Family History   Problem Relation Age of Onset   • Diabetes Mother    • Hypertension Mother    • Stomach cancer Father    • Heart attack Father    • No Known Problems Sister    • No Known Problems Brother    • Colon cancer Neg Hx    • Cirrhosis Neg Hx    • Liver cancer Neg Hx    • Liver disease Neg Hx        Social History:   Social History     Socioeconomic History   • Marital status: Single   Tobacco Use   • Smoking status: Every Day     Packs/day: 0.50     Types: Cigarettes     Start date: 1970   • Smokeless tobacco: Never   Vaping Use   • Vaping Use: Never used   Substance and Sexual Activity   • Alcohol  use: Not Currently     Comment: Hx heavy use:  quit x 07/2022   • Drug use: Never   • Sexual activity: Defer       Medications:     Current Outpatient Medications:   •  atorvastatin (LIPITOR) 80 MG tablet, Take 1 tablet by mouth Every Night., Disp: , Rfl:   •  Cholecalciferol 25 MCG (1000 UT) capsule, Take 2 capsules by mouth Daily., Disp: , Rfl:   •  furosemide (LASIX) 40 MG tablet, Take 1 tablet by mouth Daily., Disp: 30 tablet, Rfl: 5  •  gabapentin (NEURONTIN) 400 MG capsule, Take 400 mg by mouth 3 (Three) Times a Day., Disp: , Rfl:   •  lisinopril (PRINIVIL,ZESTRIL) 10 MG tablet, Take 10 mg by mouth Daily., Disp: , Rfl:   •  omeprazole (priLOSEC) 20 MG capsule, Take 20 mg by mouth Daily., Disp: , Rfl:   •  potassium chloride 10 MEQ CR tablet, Take 1 tablet by mouth Daily., Disp: , Rfl:   •  QUEtiapine (SEROquel) 200 MG tablet, Take 1 tablet by mouth every night at bedtime. 250mg total QHS, Disp: , Rfl:   •  QUEtiapine (SEROquel) 50 MG tablet, Take 1 tablet by mouth Every Night. 250mg total QHS, Disp: , Rfl:   •  spironolactone (ALDACTONE) 50 MG tablet, Take 1 tablet by mouth Daily., Disp: 90 tablet, Rfl: 0  •  vitamin B-12 (CYANOCOBALAMIN) 1000 MCG tablet, Take 1 tablet by mouth Daily., Disp: 30 tablet, Rfl: 5    Allergies:   No Known Allergies    Review of Systems:   Review of Systems    The following portions of the patient's history were reviewed and updated as appropriate: allergies, current medications, past family history, past medical history, past social history, past surgical history and problem list.    Objective     Physical Exam:  Vital Signs: There were no vitals filed for this visit.    Physical Exam    Results Review:   I reviewed the patient's new clinical results.    No visits with results within 90 Day(s) from this visit.   Latest known visit with results is:   Hospital Outpatient Visit on 10/12/2022   Component Date Value Ref Range Status   • Target HR (85%) 10/12/2022 134  bpm Final   • Max.  Pred. HR (100%) 10/12/2022 158  bpm Final   • EF(MOD-bp) 10/12/2022 62.0  % Final   • LVIDd 10/12/2022 3.7  cm Final   • LVIDs 10/12/2022 2.49  cm Final   • IVSd 10/12/2022 0.99  cm Final   • LVPWd 10/12/2022 1.07  cm Final   • FS 10/12/2022 31.8  % Final   • IVS/LVPW 10/12/2022 0.93  cm Final   • ESV(cubed) 10/12/2022 15.4  ml Final   • LV Sys Vol (BSA corrected) 10/12/2022 28.3  cm2 Final   • EDV(cubed) 10/12/2022 48.6  ml Final   • LV Miranda Vol (BSA corrected) 10/12/2022 72.4  cm2 Final   • LVOT area 10/12/2022 3.1  cm2 Final   • LV mass(C)d 10/12/2022 115.4  grams Final   • LVOT diam 10/12/2022 2.00  cm Final   • EDV(MOD-sp2) 10/12/2022 83.0  ml Final   • EDV(MOD-sp4) 10/12/2022 128.0  ml Final   • ESV(MOD-sp2) 10/12/2022 28.0  ml Final   • ESV(MOD-sp4) 10/12/2022 50.0  ml Final   • SV(MOD-sp2) 10/12/2022 55.0  ml Final   • SV(MOD-sp4) 10/12/2022 78.0  ml Final   • SI(MOD-sp2) 10/12/2022 31.1  ml/m2 Final   • SI(MOD-sp4) 10/12/2022 44.1  ml/m2 Final   • EF(MOD-sp2) 10/12/2022 66.3  % Final   • EF(MOD-sp4) 10/12/2022 60.9  % Final   • MV E max sherrell 10/12/2022 74.0  cm/sec Final   • MV A max sherrell 10/12/2022 94.8  cm/sec Final   • MV dec time 10/12/2022 0.26  msec Final   • MV E/A 10/12/2022 0.78   Final   • LA ESV Index (BP) 10/12/2022 21.5  ml/m2 Final   • SV(LVOT) 10/12/2022 84.5  ml Final   • LA dimension (2D)  10/12/2022 3.8  cm Final   • LV V1 max 10/12/2022 140.0  cm/sec Final   • LV V1 max PG 10/12/2022 7.8  mmHg Final   • LV V1 mean PG 10/12/2022 4.0  mmHg Final   • LV V1 VTI 10/12/2022 26.9  cm Final   • Ao pk sherrell 10/12/2022 232.0  cm/sec Final   • Ao max PG 10/12/2022 21.5  mmHg Final   • Ao mean PG 10/12/2022 14.0  mmHg Final   • Ao V2 VTI 10/12/2022 48.9  cm Final   • ALANNA(I,D) 10/12/2022 1.73  cm2 Final   • MV max PG 10/12/2022 5.6  mmHg Final   • MV mean PG 10/12/2022 2.00  mmHg Final   • MV V2 VTI 10/12/2022 34.6  cm Final   • MV P1/2t 10/12/2022 69.5  msec Final   • MVA(P1/2t) 10/12/2022 3.2  cm2 Final    • MVA(VTI) 10/12/2022 2.44  cm2 Final   • MV dec slope 10/12/2022 472.0  cm/sec2 Final   • TR max graham 10/12/2022 233.0  cm/sec Final   • TR max PG 10/12/2022 21.7  mmHg Final   • PA V2 max 10/12/2022 129.0  cm/sec Final   • PA acc slope 10/12/2022 911.0  cm/sec2 Final   • PA acc time 10/12/2022 0.14  sec Final   • PA pr(Accel) 10/12/2022 18.2  mmHg Final   • Ao root diam 10/12/2022 3.4  cm Final   • BH CV VAS BP LEFT ARM 10/12/2022 100/62  mmHg Final   • Med Peak E' Graham 10/12/2022 5.95  cm/sec Final   • Lat Peak E' Graham 10/12/2022 7.9  cm/sec Final   • Avg E/e' ratio 10/12/2022 10.69   Final   • RV Base 10/12/2022 3.30  cm Final   • RV Mid 10/12/2022 2.60  cm Final   • RV Length 10/12/2022 5.80  cm Final   • TAPSE (>1.6) 10/12/2022 1.70  cm Final   • RV S' 10/12/2022 16.50  cm/sec Final   • RAP systole 10/12/2022 3  mmHg Final   • RVSP(TR) 10/12/2022 25  mmHg Final   • Ascending aorta 10/12/2022 3.1  cm Final   • Echo EF Estimated 10/12/2022 75  % Final      No radiology results for the last 90 days.    EGD 9/27/2022  - Normal oropharynx.  - Z-line irregular, 34 cm from the incisors.  - Esophageal mucosal changes suspicious for short-segment Cade's esophagus. Biopsied.  - 3 cm hiatal hernia.  - Portal hypertensive gastropathy.  - Erosive gastropathy with stigmata of recent bleeding. Biopsied.  - Normal duodenal bulb, first portion of the duodenum, second portion of the duodenum and third portion of the  duodenum. Biopsied.  - Single column of Grade I and small (< 5 mm) esophageal varices.  - Candidiasis esophagitis with no bleeding.     Path; cade's  Candida  Assessment / Plan      1. Decompensated alcoholic cirrhosis of liver with ascites (HCC); MELD; MELD 9 (8/2022)  2. Alcoholic peripheral neuropathy (HCC)  3. Normocytic anemia  4.  Secondary esophageal varices without bleed  5.  Portal hypertensive gastropathy  6.  History of esophageal candidiasis  7.  Alcohol induced peripheral  neuropathy  10/20/2022  EGD done on 9/27/2020 revealed 3 columns of short segment Huynh's esophagus.  He had a grade 1 esophageal varices and mild portal hypertensive gastropathy  He is immune to appetite is a but not immune to hepatitis B.  Hep C antibody is negative.  Iron studies negative for hemochromatosis.  VAL, anti-smooth muscle antibody, antimitochondrial antibody negative.  Recent echocardiogram done reveals normal ejection fraction without any cardiomyopathy.  Ultrasound abdomen done on 10/6/2022 revealed a coarsening of hepatic echotexture consistent with cirrhosis questionable gallbladder polyp otherwise no significant ascites     We will continue his Lasix 20 mg along with Aldactone 50 mg p.o. daily  His blood pressure is low with a systolic running around 90.  We will avoid nonselective beta-blockers for now and possibly consider later point once his ascites clears and blood pressure improves.  Repeat EGD in 2 years time in September 2024  He needs hepatitis B vaccine with booster at PCPs office or health department.  Continue OTC multivitamins and B12 .  Ultrasound abdomen for HCC surveillance in April 2023.     9/22/2022  Patient history is consistent with alcohol induced liver disease with decompensated alcoholic cirrhosis with ascites.  He stopped drinking alcohol since July 2022. We will also get a vitamin B12 level.  Advised to take multivitamins p.o. daily along with vitamin B12 1000 mcg prescription given.  Anemia appears to be secondary to cirrhosis.  With a significant alcohol abuse intermittent upper GI bleed suspected. His recent CT abdomen pelvis reviewed and findings are more suggestive of either viral colitis or colonic wall thickening secondary to ascites and cirrhosis.        8. Personal history of colonic polyps  As per patient he had a colonoscopy done over 3 years ago and had a 5 polyps removed.  We will schedule him for colonoscopy now.     The indications, technique,  alternatives and potential risk and complications were discussed with the patient including but not limited to bleeding, bowel perforations, missing lesions and anesthetic complications. The patient understands and wishes to proceed with the procedure and has given their verbal consent. Written patient education information was given to the patient.   The patient will call if they have further questions before procedure.      9.  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 we will continue his low-dose PPI for Huynh's esophagus          Follow Up:   No follow-ups on file.    Lucius Barragan MD  [facility] [place]  2/1/2023  19:11 EST     Please note that portions of this note may have been completed with a voice recognition program.

## 2023-02-02 NOTE — PROGRESS NOTES
+9     Follow Up Note     Date: 2023   Patient Name: Gregor Fish Jr.  MRN: [id number]  : 1960     Referring Physician: Ana Lilia West APRN    Chief Complaint:    Chief Complaint   Patient presents with   • Cirrhosis   • Anemia   • Huynh's       Interval History:   2023  Gregor Fish Jr. is a 62 y.o. male who is here today for follow up for his cirrhosis.  He is doing very well without any significant current abdominal symptoms.  However he developed bilateral breast pain and swelling.  He states that [facility] [place] and had ultrasound abdomen scheduled for next week    2022  Gregor Fish is a 62 y.o. male who is here today to establish care with Gastroenterology for evaluation for recently diagnosed with cirrhosis.     Patient states that he has been having issues with the stomach in the form of diffuse abdominal discomfort abdominal distention nausea vomiting intermittently for the past 3 to 4 months.  He had a intermittent cough and diarrhea.  He also developed significant numbness in both upper extremities and the lower extremities for the last few months.  He was recently admitted in the hospital for above symptoms and a CT scan abdomen pelvis done revealed a signs of cirrhosis with ascites.  Also showed some colonic wall thickening on the right side suggesting possible colitis.  He has been discharged with the diuretics and since the diuretic was started his symptoms have improved significantly and his leg swelling and abdominal swelling is improved.     No prior history of any viral hepatitis.  He did not know that he had a liver disease.  He is a chronic alcoholic he used to drink at least fifth of hard liquor over 30 years.  He is also chronic smoker.  His dad had a stomach cancer.  No family history of any cirrhosis.  As per patient he had a colonoscopy about 3 to 5 years ago and had at least a 5 polyps removed.  No prior endoscopy.     He denies any  dysphagia symptoms, no nausea vomiting or reflux symptoms.  Prior history of anemia.  He denies any jaundice.  He has been tired and fatigued these days.  He had a diarrhea few months ago but diarrhea has stopped now since he stopped alcohol in July 2022..         Subjective      Past Medical History:   Past Medical History:   Diagnosis Date   • Acute kidney failure (HCC) 1980   • Alcohol abuse    • Anemia    • Anxiety    • Back pain    • Bipolar disorder (HCC)    • Chronic kidney disease    • Cirrhosis (HCC)    • Colitis 7/16/2022   • COPD (chronic obstructive pulmonary disease) (HCC)    • Depression    • Hyperlipidemia    • Hypertension    • Hypo-osmolality and hyponatremia    • Hypomagnesemia    • Insomnia    • Myocardial infarction (HCC)    • Orthostatic hypotension    • Sleep apnea     patient denies   • Sleep trouble    • Snores    • Tattoos    • Withdrawal symptoms, alcohol (HCC)      Past Surgical History:   Past Surgical History:   Procedure Laterality Date   • COLONOSCOPY     • ENDOSCOPY     • ENDOSCOPY W/ BANDING N/A 9/27/2022    Procedure: ESOPHAGOGASTRODUODENOSCOPY WITH BANDING;  Surgeon: Lucius Barragan MD;  Location: [facility] ENDOSCOPY;  Service: Gastroenterology;  Laterality: N/A;   • INGUINAL HERNIA REPAIR Bilateral    • TEETH EXTRACTION         Family History:   Family History   Problem Relation Age of Onset   • Diabetes Mother    • Hypertension Mother    • Stomach cancer Father    • Heart attack Father    • No Known Problems Sister    • No Known Problems Brother    • Colon cancer Neg Hx    • Cirrhosis Neg Hx    • Liver cancer Neg Hx    • Liver disease Neg Hx        Social History:   Social History     Socioeconomic History   • Marital status: Single   Tobacco Use   • Smoking status: Every Day     Packs/day: 0.50     Types: Cigarettes     Start date: 1970   • Smokeless tobacco: Never   Vaping Use   • Vaping Use: Never used   Substance and Sexual Activity   • Alcohol use: Not Currently      Comment: Hx heavy use:  quit x 07/2022   • Drug use: Never   • Sexual activity: Defer       Medications:     Current Outpatient Medications:   •  atorvastatin (LIPITOR) 80 MG tablet, Take 1 tablet by mouth Every Night., Disp: , Rfl:   •  B Complex Vitamins (vitamin b complex) capsule capsule, Take  by mouth Daily., Disp: , Rfl:   •  Cholecalciferol 25 MCG (1000 UT) capsule, Take 2 capsules by mouth Daily., Disp: , Rfl:   •  furosemide (LASIX) 40 MG tablet, Take 1 tablet by mouth Daily., Disp: 30 tablet, Rfl: 5  •  gabapentin (NEURONTIN) 400 MG capsule, Take 400 mg by mouth 3 (Three) Times a Day., Disp: , Rfl:   •  lisinopril (PRINIVIL,ZESTRIL) 10 MG tablet, Take 10 mg by mouth Daily., Disp: , Rfl:   •  omeprazole (priLOSEC) 20 MG capsule, Take 20 mg by mouth Daily., Disp: , Rfl:   •  potassium chloride 10 MEQ CR tablet, Take 1 tablet by mouth Daily., Disp: , Rfl:   •  QUEtiapine (SEROquel) 200 MG tablet, Take 1 tablet by mouth every night at bedtime. 250mg total QHS, Disp: , Rfl:   •  QUEtiapine (SEROquel) 50 MG tablet, Take 1 tablet by mouth Every Night. 250mg total QHS, Disp: , Rfl:   •  spironolactone (ALDACTONE) 50 MG tablet, Take 1 tablet by mouth Daily., Disp: 90 tablet, Rfl: 0  •  vitamin B-12 (CYANOCOBALAMIN) 1000 MCG tablet, Take 1 tablet by mouth Daily., Disp: 30 tablet, Rfl: 5    Allergies:   No Known Allergies    Review of Systems:   Review of Systems   Constitutional: Negative for appetite change, fatigue, fever and unexpected weight loss.   HENT: Negative for trouble swallowing.    Gastrointestinal: Negative for abdominal distention, abdominal pain, anal bleeding, blood in stool, constipation, diarrhea, nausea, rectal pain, vomiting, GERD and indigestion.   Genitourinary:        Gynecomastia     Musculoskeletal: Positive for arthralgias.       The following portions of the patient's history were reviewed and updated as appropriate: allergies, current medications, past family history, past medical  "history, past social history, past surgical history and problem list.    Objective     Physical Exam:  Vital Signs:   Vitals:    02/02/23 1337   BP: 95/63   Pulse: 75   Resp: 20   Temp: 98.6 °F (37 °C)   TempSrc: Infrared   Weight: 69.4 kg (153 lb)   Height: 175.3 cm (69\")       Physical Exam  Constitutional:       Appearance: Normal appearance.   HENT:      Head: Normocephalic and atraumatic.   Eyes:      Conjunctiva/sclera: Conjunctivae normal.   Abdominal:      General: Abdomen is flat. There is no distension.      Palpations: There is no mass.      Tenderness: There is no abdominal tenderness. There is no guarding or rebound.      Hernia: No hernia is present.   Musculoskeletal:      Cervical back: Normal range of motion and neck supple.   Neurological:      Mental Status: He is alert.         Results Review:   I reviewed the patient's new clinical results.    No visits with results within 90 Day(s) from this visit.   Latest known visit with results is:   Hospital Outpatient Visit on 10/12/2022   Component Date Value Ref Range Status   • Target HR (85%) 10/12/2022 134  bpm Final   • Max. Pred. HR (100%) 10/12/2022 158  bpm Final   • EF(MOD-bp) 10/12/2022 62.0  % Final   • LVIDd 10/12/2022 3.7  cm Final   • LVIDs 10/12/2022 2.49  cm Final   • IVSd 10/12/2022 0.99  cm Final   • LVPWd 10/12/2022 1.07  cm Final   • FS 10/12/2022 31.8  % Final   • IVS/LVPW 10/12/2022 0.93  cm Final   • ESV(cubed) 10/12/2022 15.4  ml Final   • LV Sys Vol (BSA corrected) 10/12/2022 28.3  cm2 Final   • EDV(cubed) 10/12/2022 48.6  ml Final   • LV Miranda Vol (BSA corrected) 10/12/2022 72.4  cm2 Final   • LVOT area 10/12/2022 3.1  cm2 Final   • LV mass(C)d 10/12/2022 115.4  grams Final   • LVOT diam 10/12/2022 2.00  cm Final   • EDV(MOD-sp2) 10/12/2022 83.0  ml Final   • EDV(MOD-sp4) 10/12/2022 128.0  ml Final   • ESV(MOD-sp2) 10/12/2022 28.0  ml Final   • ESV(MOD-sp4) 10/12/2022 50.0  ml Final   • SV(MOD-sp2) 10/12/2022 55.0  ml Final   • " SV(MOD-sp4) 10/12/2022 78.0  ml Final   • SI(MOD-sp2) 10/12/2022 31.1  ml/m2 Final   • SI(MOD-sp4) 10/12/2022 44.1  ml/m2 Final   • EF(MOD-sp2) 10/12/2022 66.3  % Final   • EF(MOD-sp4) 10/12/2022 60.9  % Final   • MV E max graham 10/12/2022 74.0  cm/sec Final   • MV A max graham 10/12/2022 94.8  cm/sec Final   • MV dec time 10/12/2022 0.26  msec Final   • MV E/A 10/12/2022 0.78   Final   • LA ESV Index (BP) 10/12/2022 21.5  ml/m2 Final   • SV(LVOT) 10/12/2022 84.5  ml Final   • LA dimension (2D)  10/12/2022 3.8  cm Final   • LV V1 max 10/12/2022 140.0  cm/sec Final   • LV V1 max PG 10/12/2022 7.8  mmHg Final   • LV V1 mean PG 10/12/2022 4.0  mmHg Final   • LV V1 VTI 10/12/2022 26.9  cm Final   • Ao pk graham 10/12/2022 232.0  cm/sec Final   • Ao max PG 10/12/2022 21.5  mmHg Final   • Ao mean PG 10/12/2022 14.0  mmHg Final   • Ao V2 VTI 10/12/2022 48.9  cm Final   • ALANNA(I,D) 10/12/2022 1.73  cm2 Final   • MV max PG 10/12/2022 5.6  mmHg Final   • MV mean PG 10/12/2022 2.00  mmHg Final   • MV V2 VTI 10/12/2022 34.6  cm Final   • MV P1/2t 10/12/2022 69.5  msec Final   • MVA(P1/2t) 10/12/2022 3.2  cm2 Final   • MVA(VTI) 10/12/2022 2.44  cm2 Final   • MV dec slope 10/12/2022 472.0  cm/sec2 Final   • TR max graham 10/12/2022 233.0  cm/sec Final   • TR max PG 10/12/2022 21.7  mmHg Final   • PA V2 max 10/12/2022 129.0  cm/sec Final   • PA acc slope 10/12/2022 911.0  cm/sec2 Final   • PA acc time 10/12/2022 0.14  sec Final   • PA pr(Accel) 10/12/2022 18.2  mmHg Final   • Ao root diam 10/12/2022 3.4  cm Final   • BH CV VAS BP LEFT ARM 10/12/2022 100/62  mmHg Final   • Med Peak E' Graham 10/12/2022 5.95  cm/sec Final   • Lat Peak E' Graham 10/12/2022 7.9  cm/sec Final   • Avg E/e' ratio 10/12/2022 10.69   Final   • RV Base 10/12/2022 3.30  cm Final   • RV Mid 10/12/2022 2.60  cm Final   • RV Length 10/12/2022 5.80  cm Final   • TAPSE (>1.6) 10/12/2022 1.70  cm Final   • RV S' 10/12/2022 16.50  cm/sec Final   • RAP systole 10/12/2022 3  mmHg  Final   • RVSP(TR) 10/12/2022 25  mmHg Final   • Ascending aorta 10/12/2022 3.1  cm Final   • Echo EF Estimated 10/12/2022 75  % Final      No radiology results for the last 90 days.    US Abdomen Complete     Result Date: 10/20/2022  Coarsening of the hepatic echotexture can be seen with cirrhosis.  Questionable gallbladder polyps. No well-defined gallstones.      Images were reviewed, interpreted, and dictated by Dr. Azam Garcia MD Transcribed by Arelis Bhatia PA-C.  This report was signed and finalized on 10/20/2022 7:24 AM by Azam Garcia MD.     XR Chest 1 View     Result Date: 8/16/2022  Minimal bilateral pleural effusions with mild atelectasis.  This report was signed and finalized on 8/16/2022 8:23 AM by Jared Taveras MD.     EGD 9/27/2022  - Normal oropharynx.  - Z-line irregular, 34 cm from the incisors.  - Esophageal mucosal changes suspicious for short-segment Cade's esophagus. Biopsied.  - 3 cm hiatal hernia.  - Portal hypertensive gastropathy.  - Erosive gastropathy with stigmata of recent bleeding. Biopsied.  - Normal duodenal bulb, first portion of the duodenum, second portion of the duodenum and third portion of the  duodenum. Biopsied.  - Single column of Grade I and small (< 5 mm) esophageal varices.  - Candidiasis esophagitis with no bleeding.     Path; cade's without dysplasia  Candida  Assessment / Plan      1. Decompensated alcoholic cirrhosis of liver with ascites (HCC); MELD; MELD 9 (8/2022)  2. Alcoholic peripheral neuropathy (HCC)  3. Normocytic anemia  4.  Grade 1 Secondary esophageal varices without bleed  5.  Portal hypertensive gastropathy  6.  History of Cade's esophagus     2/2/2023  He is doing very well so far however developed gynecomastia likely secondary to spironolactone use.   As per patient he has a [facility] to be done next week.  He is due for ultrasound for HCC surveillance in April 2023.   Continue vitamin  B12  PPI daily for Huynh's esophagus  We will get lab work CBC CMP PT/INR and score his meld score  We will discontinue his spironolactone for now due to gynecomastia  We will continue the Lasix 20 mg p.o. daily.  We will decide on increasing the dose depending on ultrasound report   Repeat EGD in 2 years time in September 2024  He needs hepatitis B vaccine with booster at PCPs office or health department.    10/20/2022  EGD done on 9/27/2022 revealed 3 columns of short segment Huynh's esophagus.  He had a grade 1 esophageal varices and mild portal hypertensive gastropathy  He is immune to appetite is a but not immune to hepatitis B.  Hep C antibody is negative.  Iron studies negative for hemochromatosis.  VAL, anti-smooth muscle antibody, antimitochondrial antibody negative.  Recent echocardiogram done reveals normal ejection fraction without any cardiomyopathy. Ultrasound abdomen done on 10/6/2022 revealed a coarsening of hepatic echotexture consistent with cirrhosis questionable gallbladder polyp otherwise no significant ascites     9/22/2022  Patient history is consistent with alcohol induced liver disease with decompensated alcoholic cirrhosis with ascites.  He stopped drinking alcohol since July 2022. We will also get a vitamin B12 level.  Advised to take multivitamins p.o. daily along with vitamin B12 1000 mcg prescription given.  Anemia appears to be secondary to cirrhosis.  With a significant alcohol abuse intermittent upper GI bleed suspected. His recent CT abdomen pelvis reviewed and findings are more suggestive of either viral colitis or colonic wall thickening secondary to ascites and cirrhosis.        7. Personal history of colonic polyps  His last colonoscopy was over 3 years ago and had a 5 polyps removed and he is overdue for screening colonoscopy now.    Will schedule the colonoscopy   The indications, technique, alternatives and potential risk and complications were discussed with the patient  including but not limited to bleeding, bowel perforations, missing lesions and anesthetic complications. The patient understands and wishes to proceed with the procedure and has given their verbal consent. Written patient education information was given to the patient.   The patient will call if they have further questions before procedure.          Follow Up:   No follow-ups on file.    Lucius Barragan MD  [facility]  2/2/2023  13:39 EST     Please note that portions of this note may have been completed with a voice recognition program.

## 2023-02-06 PROBLEM — Z86.0100 PERSONAL HISTORY OF COLONIC POLYPS: Status: ACTIVE | Noted: 2023-02-06

## 2023-02-06 PROBLEM — Z86.010 PERSONAL HISTORY OF COLONIC POLYPS: Status: ACTIVE | Noted: 2023-02-06

## 2023-02-15 ENCOUNTER — TELEPHONE (OUTPATIENT)
Dept: GASTROENTEROLOGY | Facility: CLINIC | Age: 63
End: 2023-02-15
Payer: MEDICARE

## 2023-02-15 RX ORDER — SODIUM PICOSULFATE, MAGNESIUM OXIDE, AND ANHYDROUS CITRIC ACID 10; 3.5; 12 MG/160ML; G/160ML; G/160ML
2 LIQUID ORAL TAKE AS DIRECTED
Qty: 320 ML | Refills: 0 | Status: SHIPPED | OUTPATIENT
Start: 2023-02-15

## 2023-02-15 NOTE — TELEPHONE ENCOUNTER
----- Message from Sheridan Flores MA sent at 2/15/2023  9:58 AM EST -----  Suprep is not covered by insurance. It lists alternatives as Clenpiq or Gavilyte

## 2023-04-03 RX ORDER — SPIRONOLACTONE 100 MG/1
100 TABLET, FILM COATED ORAL DAILY
Qty: 30 TABLET | Refills: 5 | OUTPATIENT
Start: 2023-04-03

## 2023-04-20 ENCOUNTER — PREP FOR SURGERY (OUTPATIENT)
Dept: OTHER | Facility: HOSPITAL | Age: 63
End: 2023-04-20
Payer: MEDICARE

## 2023-04-20 DIAGNOSIS — Z86.010 PERSONAL HISTORY OF COLONIC POLYPS: Primary | ICD-10-CM

## 2023-04-20 RX ORDER — SODIUM CHLORIDE 9 MG/ML
70 INJECTION, SOLUTION INTRAVENOUS CONTINUOUS PRN
OUTPATIENT
Start: 2023-04-20

## 2023-05-02 ENCOUNTER — ANESTHESIA (OUTPATIENT)
Dept: GASTROENTEROLOGY | Facility: HOSPITAL | Age: 63
End: 2023-05-02
Payer: MEDICARE

## 2023-05-02 ENCOUNTER — ANESTHESIA EVENT (OUTPATIENT)
Dept: GASTROENTEROLOGY | Facility: HOSPITAL | Age: 63
End: 2023-05-02
Payer: MEDICARE

## 2023-05-02 ENCOUNTER — HOSPITAL ENCOUNTER (OUTPATIENT)
Facility: HOSPITAL | Age: 63
Setting detail: HOSPITAL OUTPATIENT SURGERY
Discharge: HOME | End: 2023-05-02
Attending: INTERNAL MEDICINE | Admitting: INTERNAL MEDICINE
Payer: MEDICARE

## 2023-05-02 VITALS
TEMPERATURE: 97.3 F | DIASTOLIC BLOOD PRESSURE: 85 MMHG | RESPIRATION RATE: 16 BRPM | SYSTOLIC BLOOD PRESSURE: 120 MMHG | OXYGEN SATURATION: 95 % | HEART RATE: 65 BPM

## 2023-05-02 DIAGNOSIS — Z86.010 PERSONAL HISTORY OF COLONIC POLYPS: ICD-10-CM

## 2023-05-02 PROCEDURE — 25010000002 PROPOFOL 10 MG/ML EMULSION: Performed by: NURSE ANESTHETIST, CERTIFIED REGISTERED

## 2023-05-02 RX ORDER — PROPOFOL 10 MG/ML
VIAL (ML) INTRAVENOUS AS NEEDED
Status: DISCONTINUED | OUTPATIENT
Start: 2023-05-02 | End: 2023-05-02 | Stop reason: SURG

## 2023-05-02 RX ORDER — SIMETHICONE 20 MG/.3ML
EMULSION ORAL AS NEEDED
Status: DISCONTINUED | OUTPATIENT
Start: 2023-05-02 | End: 2023-05-02 | Stop reason: HOSPADM

## 2023-05-02 RX ORDER — SODIUM CHLORIDE 9 MG/ML
70 INJECTION, SOLUTION INTRAVENOUS CONTINUOUS PRN
Status: DISCONTINUED | OUTPATIENT
Start: 2023-05-02 | End: 2023-05-02 | Stop reason: HOSPADM

## 2023-05-02 RX ORDER — LIDOCAINE HYDROCHLORIDE 20 MG/ML
INJECTION, SOLUTION INTRAVENOUS AS NEEDED
Status: DISCONTINUED | OUTPATIENT
Start: 2023-05-02 | End: 2023-05-02 | Stop reason: SURG

## 2023-05-02 RX ORDER — KETAMINE HYDROCHLORIDE 50 MG/ML
INJECTION, SOLUTION, CONCENTRATE INTRAMUSCULAR; INTRAVENOUS AS NEEDED
Status: DISCONTINUED | OUTPATIENT
Start: 2023-05-02 | End: 2023-05-02 | Stop reason: SURG

## 2023-05-02 RX ORDER — ONDANSETRON 2 MG/ML
4 INJECTION INTRAMUSCULAR; INTRAVENOUS ONCE AS NEEDED
Status: CANCELLED | OUTPATIENT
Start: 2023-05-02 | End: 2023-05-02

## 2023-05-02 RX ADMIN — LIDOCAINE HYDROCHLORIDE 100 MG: 20 INJECTION, SOLUTION INTRAVENOUS at 12:43

## 2023-05-02 RX ADMIN — SODIUM CHLORIDE 70 ML/HR: 9 INJECTION, SOLUTION INTRAVENOUS at 10:25

## 2023-05-02 RX ADMIN — PROPOFOL 350 MG: 10 INJECTION, EMULSION INTRAVENOUS at 12:43

## 2023-05-02 RX ADMIN — KETAMINE HYDROCHLORIDE 25 MG: 50 INJECTION, SOLUTION INTRAMUSCULAR; INTRAVENOUS at 12:43

## 2023-05-02 NOTE — ANESTHESIA POSTPROCEDURE EVALUATION
Patient: Gregor Fish Jr.    Procedure Summary     Date: 05/02/23 Room / Location: [facility] ENDOSCOPY 2 / [facility] ENDOSCOPY    Anesthesia Start: 1237 Anesthesia Stop: 1308    Procedure: COLONOSCOPY WITH POLYPECTOMY (Anus) Diagnosis:       Personal history of colonic polyps      (Personal history of colonic polyps [Z86.010])    Surgeons: Lucius Barragan MD Provider: Leo Borja CRNA    Anesthesia Type: MAC ASA Status: 3          Anesthesia Type: MAC    Vitals  Vitals Value Taken Time   /85 05/02/23 1331   Temp 97.3 °F (36.3 °C) 05/02/23 1331   Pulse 65 05/02/23 1331   Resp 16 05/02/23 1331   SpO2 95 % 05/02/23 1331           Post Anesthesia Care and Evaluation    Patient location during evaluation: PHASE II  Patient participation: complete - patient participated  Level of consciousness: awake  Pain score: 1  Pain management: adequate    Airway patency: patent  Anesthetic complications: No anesthetic complications  PONV Status: controlled  Cardiovascular status: acceptable and stable  Respiratory status: acceptable  Hydration status: acceptable    Comments: See Nursing record for post procedural Vital Signs as per protocol.

## 2023-05-02 NOTE — DISCHARGE INSTRUCTIONS
Rest today  No pushing,pulling,tugging,heavy lifting, or strenuous activity   No major decision making,driving,or drinking alcoholic beverages for 24 hours due to the sedation you received  Always use good hand hygiene/washing technique  No driving on pain medication.     To assist you in voiding:  Drink plenty of fluids  Listen to running water while attempting to void.    If you are unable to urinate and you have an uncomfortable urge to void or it has been   6 hours since you were discharged, return to the Emergency Room.    - Discharge patient to home (via cart).   - High fiber diet.   - Continue present medications.   - Await pathology results.   - Repeat colonoscopy in 3 - 5 years for surveillance and due to suboptimal bowel prep   - Return to GI office in 8 weeks.

## 2023-05-02 NOTE — H&P
[facility]  HISTORY AND PHYSICAL    Patient Name: Gregor Fish Jr.  : 1960  MRN: [id number]    Chief Complaint:   For surveillance colonoscopy    History Of Presenting Illness:    H/o colon polyps 3yrs ago    Past Medical History:   Diagnosis Date   • Acute kidney failure    • Alcohol abuse    • Anemia    • Anxiety    • Back pain    • Bipolar disorder    • Chronic kidney disease    • Cirrhosis    • Colitis 2022   • COPD (chronic obstructive pulmonary disease)    • Depression    • Hyperlipidemia    • Hypertension    • Hypo-osmolality and hyponatremia    • Hypomagnesemia    • Insomnia    • Myocardial infarction    • Orthostatic hypotension    • Sleep apnea     patient denies   • Sleep trouble    • Snores    • Tattoos    • Withdrawal symptoms, alcohol        Past Surgical History:   Procedure Laterality Date   • COLONOSCOPY     • ENDOSCOPY     • ENDOSCOPY W/ BANDING N/A 2022    Procedure: ESOPHAGOGASTRODUODENOSCOPY WITH BANDING;  Surgeon: Lucius Barragan MD;  Location: [facility] ENDOSCOPY;  Service: Gastroenterology;  Laterality: N/A;   • INGUINAL HERNIA REPAIR Bilateral    • TEETH EXTRACTION         Social History     Socioeconomic History   • Marital status: Single   Tobacco Use   • Smoking status: Every Day     Packs/day: 0.50     Types: Cigarettes     Start date:    • Smokeless tobacco: Never   Vaping Use   • Vaping Use: Never used   Substance and Sexual Activity   • Alcohol use: Not Currently     Comment: Hx heavy use:  quit x 2022   • Drug use: Never   • Sexual activity: Defer       Family History   Problem Relation Age of Onset   • Diabetes Mother    • Hypertension Mother    • Stomach cancer Father    • Heart attack Father    • No Known Problems Sister    • No Known Problems Brother    • Colon cancer Neg Hx    • Cirrhosis Neg Hx    • Liver cancer Neg Hx    • Liver disease Neg Hx        Prior to Admission Medications:  Medications Prior to Admission   Medication  Sig Dispense Refill Last Dose   • atorvastatin (LIPITOR) 80 MG tablet Take 1 tablet by mouth Every Night.   5/1/2023 at 2100   • B Complex Vitamins (vitamin b complex) capsule capsule Take  by mouth Daily.   5/1/2023 at 0800   • Cholecalciferol 25 MCG (1000 UT) capsule Take 2 capsules by mouth Daily.   5/1/2023 at 0800   • furosemide (LASIX) 40 MG tablet Take 1 tablet by mouth Daily. 30 tablet 5 5/1/2023 at 0800   • gabapentin (NEURONTIN) 400 MG capsule Take 1 capsule by mouth 3 (Three) Times a Day.   5/1/2023 at 2100   • lisinopril (PRINIVIL,ZESTRIL) 10 MG tablet Take 1 tablet by mouth Daily.   5/1/2023 at 0800   • potassium chloride 10 MEQ CR tablet Take 1 tablet by mouth Daily.   5/1/2023 at 0800   • QUEtiapine (SEROquel) 200 MG tablet Take 1 tablet by mouth every night at bedtime. 250mg total QHS   5/1/2023 at 2100   • QUEtiapine (SEROquel) 50 MG tablet Take 1 tablet by mouth Every Night. 250mg total QHS   5/1/2023 at 2100   • Sod Picosulfate-Mag Ox-Cit Acd (Clenpiq) 10-3.5-12 MG-GM -GM/160ML solution Take 320 mL by mouth Take As Directed. Follow directions from office 320 mL 0 5/2/2023 at 0400   • vitamin B-12 (CYANOCOBALAMIN) 1000 MCG tablet Take 1 tablet by mouth Daily. 30 tablet 5 5/1/2023 at 0800   • spironolactone (ALDACTONE) 50 MG tablet Take 1 tablet by mouth Daily. 90 tablet 0 Unknown       Allergies:  No Known Allergies     Vitals: Temp:  [97.2 °F (36.2 °C)] 97.2 °F (36.2 °C)  Heart Rate:  [72] 72  Resp:  [18] 18  BP: (122)/(85) 122/85    Review Of Systems:  Constitutional:  Negative for chills, fever, and unexpected weight change.  Respiratory:  Negative for cough, chest tightness, shortness of breath, and wheezing.  Cardiovascular:  Negative for chest pain, palpitations, and leg swelling.  Gastrointestinal:  Negative for abdominal distention, abdominal pain, nausea, vomiting.  Neurological:  Negative for weakness, numbness, and headaches.     Physical Exam:    General Appearance:  Alert, cooperative,  in no acute distress.   Lungs:   Clear to auscultation, respirations regular, even and                 unlabored.   Heart:  Regular rhythm and normal rate.   Abdomen:   Normal bowel sounds, no masses, no organomegaly. Soft, nontender, nondistended   Neurologic: Alert and oriented x 3. Moves all four limbs equally       Assessment & Plan     Assessment:  Principal Problem:    Personal history of colonic polyps      Plan: COLONOSCOPY (N/A)     Lucius Barragan MD  5/2/2023     Principal Discharge DX:	Pilonidal abscess

## 2023-05-02 NOTE — ANESTHESIA PREPROCEDURE EVALUATION
Anesthesia Evaluation     Patient summary reviewed and Nursing notes reviewed   no history of anesthetic complications:  NPO Solid Status: > 8 hours  NPO Liquid Status: > 8 hours           Airway   Mallampati: II  TM distance: >3 FB  Neck ROM: full  no difficulty expected and Possible difficult intubation  Dental - normal exam     Pulmonary - normal exam   (+) a smoker Current Smoked day of surgery, COPD, sleep apnea,   Cardiovascular - normal exam    Rhythm: regular  Rate: normal    (+) hypertension 2 medications or greater, valvular problems/murmurs, past MI , hyperlipidemia,       Neuro/Psych  (+) psychiatric history Anxiety and Depression,    GI/Hepatic/Renal/Endo    (+)   liver disease, renal disease CRI,     Musculoskeletal     (+) back pain,   Abdominal    Substance History   (+) alcohol use,      OB/GYN negative ob/gyn ROS         Other - negative ROS                       Anesthesia Plan    ASA 3     MAC     (Pt told that intravenous sedation will be used as the primary anesthetic along with local anesthesia if necessary. Every effort will be made to make sure the patient is comfortable.     The patient was told they may or may not have recall for the procedure. It was further explained that if the MAC was not adequate that a general anesthetic with either an LMA or endotracheal tube would be required.     Will proceed with the plan of care.)  intravenous induction     Anesthetic plan, risks, benefits, and alternatives have been provided, discussed and informed consent has been obtained with: patient.        CODE STATUS:

## 2023-05-04 LAB — REF LAB TEST METHOD: NORMAL

## 2023-06-08 ENCOUNTER — TELEPHONE (OUTPATIENT)
Dept: GASTROENTEROLOGY | Facility: CLINIC | Age: 63
End: 2023-06-08
Payer: MEDICARE

## 2023-06-08 NOTE — TELEPHONE ENCOUNTER
1st attempt: Called patient regarding overdue results. He will stop by the lab next week and complete

## 2023-06-11 NOTE — PROGRESS NOTES
Follow Up Note     Date: 2023   Patient Name: Gregor Fish Jr.  MRN: [id number]  : 1960     Referring Physician: Ana Lilia West APRN    Chief Complaint:    Chief Complaint   Patient presents with    Cirrhosis     Hx anemia  Hx cade's        Interval History:   2023  Gregor Fish Jr. is a 62 y.o. male who is here today for follow up for follow-up of his cirrhosis.  He states that he has been having good daily bowel movement no confusional episodes.  Has some extremity numbness.    2022  Gregor Fish is a 62 y.o. male who is here today to establish care with Gastroenterology for evaluation for recently diagnosed with cirrhosis.  Patient states that he has been having issues with the stomach in the form of diffuse abdominal discomfort abdominal distention nausea vomiting intermittently for the past 3 to 4 months.  He had a intermittent cough and diarrhea.  He also developed significant numbness in both upper extremities and the lower extremities for the last few months.  He was recently admitted in the hospital for above symptoms and a CT scan abdomen pelvis done revealed a signs of cirrhosis with ascites.  Also showed some colonic wall thickening on the right side suggesting possible colitis.  He has been discharged with the diuretics and since the diuretic was started his symptoms have improved significantly and his leg swelling and abdominal swelling is improved.     No prior history of any viral hepatitis.  He did not know that he had a liver disease.  He is a chronic alcoholic he used to drink at least fifth of hard liquor over 30 years.  He is also chronic smoker.  His dad had a stomach cancer.  No family history of any cirrhosis.  As per patient he had a colonoscopy about 3 to 5 years ago and had at least a 5 polyps removed.  No prior endoscopy.     He denies any dysphagia symptoms, no nausea vomiting or reflux symptoms.  Prior history of anemia.  He denies any jaundice.  He  has been tired and fatigued these days.  He had a diarrhea few months ago but diarrhea has stopped now since he stopped alcohol in July 2022..    Subjective      Past Medical History:   Past Medical History:   Diagnosis Date    Acute kidney failure 1980    Alcohol abuse     Anemia     Anxiety     Back pain     Bipolar disorder     Chronic kidney disease     Cirrhosis     Colitis 7/16/2022    COPD (chronic obstructive pulmonary disease)     Depression     Hyperlipidemia     Hypertension     Hypo-osmolality and hyponatremia     Hypomagnesemia     Insomnia     Myocardial infarction     Orthostatic hypotension     Sleep apnea     patient denies    Sleep trouble     Snores     Tattoos     Withdrawal symptoms, alcohol      Past Surgical History:   Past Surgical History:   Procedure Laterality Date    COLONOSCOPY      COLONOSCOPY N/A 5/2/2023    Procedure: COLONOSCOPY WITH POLYPECTOMY;  Surgeon: Lucius Barragan MD;  Location: [facility] ENDOSCOPY;  Service: Gastroenterology;  Laterality: N/A;    ENDOSCOPY      ENDOSCOPY W/ BANDING N/A 9/27/2022    Procedure: ESOPHAGOGASTRODUODENOSCOPY WITH BANDING;  Surgeon: Lucius Barragan MD;  Location: [facility] ENDOSCOPY;  Service: Gastroenterology;  Laterality: N/A;    INGUINAL HERNIA REPAIR Bilateral     TEETH EXTRACTION         Family History:   Family History   Problem Relation Age of Onset    Diabetes Mother     Hypertension Mother     Stomach cancer Father     Heart attack Father     No Known Problems Sister     No Known Problems Brother     Colon cancer Neg Hx     Cirrhosis Neg Hx     Liver cancer Neg Hx     Liver disease Neg Hx        Social History:   Social History     Socioeconomic History    Marital status: Single   Tobacco Use    Smoking status: Every Day     Packs/day: 0.50     Types: Cigarettes     Start date: 1970     Passive exposure: Current    Smokeless tobacco: Never   Vaping Use    Vaping Use: Never used   Substance and Sexual Activity    Alcohol use: Not  Currently     Comment: Hx heavy use:  quit x 07/2022    Drug use: Never    Sexual activity: Defer       Medications:     Current Outpatient Medications:     atorvastatin (LIPITOR) 80 MG tablet, Take 1 tablet by mouth Every Night., Disp: , Rfl:     B Complex Vitamins (vitamin b complex) capsule capsule, Take  by mouth Daily., Disp: , Rfl:     Cholecalciferol 25 MCG (1000 UT) capsule, Take 2 capsules by mouth Daily., Disp: , Rfl:     furosemide (LASIX) 40 MG tablet, Take 1 tablet by mouth Daily., Disp: 30 tablet, Rfl: 5    gabapentin (NEURONTIN) 400 MG capsule, Take 1 capsule by mouth 3 (Three) Times a Day., Disp: , Rfl:     lisinopril (PRINIVIL,ZESTRIL) 10 MG tablet, Take 1 tablet by mouth Daily., Disp: , Rfl:     pantoprazole (PROTONIX) 40 MG EC tablet, Take 1 tablet by mouth Daily., Disp: , Rfl:     potassium chloride 10 MEQ CR tablet, Take 1 tablet by mouth Daily., Disp: , Rfl:     QUEtiapine (SEROquel) 200 MG tablet, Take 1 tablet by mouth every night at bedtime. 250mg total QHS, Disp: , Rfl:     QUEtiapine (SEROquel) 50 MG tablet, Take 1 tablet by mouth Every Night. 250mg total QHS, Disp: , Rfl:     spironolactone (ALDACTONE) 50 MG tablet, Take 1 tablet by mouth Daily. (Patient not taking: Reported on 6/12/2023), Disp: 90 tablet, Rfl: 0    Allergies:   No Known Allergies    Review of Systems:   Review of Systems   Constitutional:  Positive for fatigue and unexpected weight loss. Negative for fever and unexpected weight gain.   HENT:  Negative for trouble swallowing.    Gastrointestinal:  Negative for abdominal distention, abdominal pain, anal bleeding, blood in stool, constipation, diarrhea, nausea, rectal pain, vomiting, GERD and indigestion.     The following portions of the patient's history were reviewed and updated as appropriate: allergies, current medications, past family history, past medical history, past social history, past surgical history and problem list.    Objective     Physical Exam:  Vital  "Signs:   Vitals:    23 1538   BP: 94/79   BP Location: Right arm   Patient Position: Sitting   Cuff Size: Adult   Pulse: 90   Temp: 97.5 °F (36.4 °C)   SpO2: 96%   Weight: 68.9 kg (152 lb)   Height: 175.3 cm (69\")       Physical Exam  Constitutional:       Appearance: Normal appearance.   HENT:      Head: Normocephalic and atraumatic.   Eyes:      Conjunctiva/sclera: Conjunctivae normal.   Abdominal:      General: Abdomen is flat. There is no distension.      Palpations: There is no mass.      Tenderness: There is no abdominal tenderness. There is no guarding or rebound.      Hernia: No hernia is present.   Musculoskeletal:      Cervical back: Normal range of motion and neck supple.   Neurological:      Mental Status: He is alert.       Results Review:   I reviewed the patient's new clinical results.    Admission on 2023, Discharged on 2023   Component Date Value Ref Range Status    Reference Lab Report 2023    Final                    Value:[facility]  [address] [place] [address]  Phone: [phone] or [phone]  Fax: [phone]  Avi Kearney M.D., Medical Director    PATIENT NAME                           LABORATORY NO.  ANIYAH CASEY                          [id number]  [id number]                         AGE              SEX  SSN           CLIENT REF #  [facility]            62      1960      [id number]   [id number]    [address] [place] [address] BY-PASS                REQUESTING M.D.     ATTENDING MJULIA     COPY TO.   [address]                        [place] [address]                 Formerly [facility]  DATE COLLECTED      DATE RECEIVED      DATE REPORTED  2023    DIAGNOSIS:  A.   CECUM POLYP:  Tubular adenoma; negative for high-grade dysplasia.  B.   HEPATIC FLEXURE POLYP:  Tubular adenoma; negative for high-grade dysplasia.  C.   RECTOSIGMOID COLON " POLYPS, X 3:  Hyperplastic                           polyp.    WJB    CLINICAL HISTORY:  Personal history of colonic polyps    SPECIMENS RECEIVED:  A.  CECUM POLYP  B.  HEPATIC FLEXURE POLYP  C.  RECTOSIGMOID COLON POLYPS, X 3    MICROSCOPIC DESCRIPTION:  Tissue blocks are prepared and slides are examined microscopically on all  specimens. See diagnosis for details.    Professional interpretation rendered by Kyle Car D.O. at [facility],  [place], [address] [place] [address].    GROSS DESCRIPTION:  A.  Labeled cecal polyp are 2 portions of tan soft tissue measuring 0.4 x 0.4 x  0.2 cm in aggregate, but is entirely in 1 block.  MTH  B.  Labeled hepatic flexure polyp are 2 portions of tan soft tissue measuring  0.7 x 0.5 x 0.2 cm in aggregate, submitted entirely in 1 block.  C.  Labeled rectosigmoid colon polyp x3 are multiple portions of tan soft  tissue measuring mixed with possible vegetative matter 1.9 x 1.4 x 0.2 cm  in aggregate, filtered and submitted entirely in 1 block.    REVIEWED, DIAGNOSED AND                           ELECTRONICALLY  SIGNED BY:    Kyle Car D.O.  CPT CODES:  88305x3        No radiology results for the last 90 days.    EGD 9/27/2022  - Normal oropharynx.  - Z-line irregular, 34 cm from the incisors.  - Esophageal mucosal changes suspicious for short-segment Cade's esophagus. Biopsied.  - 3 cm hiatal hernia.  - Portal hypertensive gastropathy.  - Erosive gastropathy with stigmata of recent bleeding. Biopsied.  - Normal duodenal bulb, first portion of the duodenum, second portion of the duodenum and third portion of the  duodenum. Biopsied.  - Single column of Grade I and small (< 5 mm) esophageal varices.  - Candidiasis esophagitis with no bleeding.     Path; cade's without dysplasia, Candida    Colonoscopy on 5/2/2023  - Preparation of the colon was fair. Left colon views were sumoptimal due to solid stool  - One 3 mm polyp in the cecum, removed with a  cold snare. Resected and retrieved.  - One 4 mm polyp at the hepatic flexure, removed with a cold snare. Resected and retrieved.  - Few 2 to 3 mm polyps in the rectum and at the recto-sigmoid colon, three of these removed with a cold snare.  Resected and retrieved. Cliniclaly hyperplastic  - Diverticulosis in the sigmoid colon and in the descending colon.  - Non-bleeding external and internal hemorrhoids.  - The examined portion of the ileum was normal.  - Stool in the entire examined colon.    Pathology  CECUM POLYP:  Tubular adenoma; negative for high-grade dysplasia.  B. HEPATIC FLEXURE POLYP:  Tubular adenoma; negative for high-grade dysplasia.  C. RECTOSIGMOID COLON POLYPS, X 3:  Hyperplastic polyp.    Assessment / Plan      1. Decompensated alcoholic cirrhosis of liver with ascites (HCC); MELD 9 (8/2022)   MELD;   stopped ETH July 2022  2. Alcoholic peripheral neuropathy (HCC)  3. Normocytic anemia  4.  Grade 1 Secondary esophageal varices without bleed  5.  Portal hypertensive gastropathy  6.  History of Huynh's esophagus   7.  Asymptomatic gallbladder polyp  6/12/2023  Overall patient is doing very well.  Ultrasound abdomen done in February 2023 revealed signs of cirrhosis without any ascites or liver lesion.  We will small probable gallbladder polyp 6 mm in size.    His blood pressure running on the lower side.  We will discontinue his lisinopril and start him on nadolol 10 mg p.o. daily at night for PHG prophylaxis.  Titrate up the dose next visit.    Given his Huynh's esophagus we will continue PPI  We will continue Lasix for now and will reduce the dose to 20 mg p.o. daily next visit if no further events of any ascites.  We will get a repeat labs with a MELD score  Ultrasound abdomen in August 2023 for HCC surveillance  Repeat EGD in 2 years time in September 2024  He needs hepatitis B vaccine with booster at PCPs office or health department.    10/20/2022  EGD done on 9/27/2022 revealed 3 columns of  short segment Huynh's esophagus.  He had a grade 1 esophageal varices and mild portal hypertensive gastropathy  He is immune to appetite is a but not immune to hepatitis B.  Hep C antibody is negative.  Iron studies negative for hemochromatosis.  VAL, anti-smooth muscle antibody, antimitochondrial antibody negative.  Recent echocardiogram done reveals normal ejection fraction without any cardiomyopathy. Ultrasound abdomen done on 10/6/2022 revealed a coarsening of hepatic echotexture consistent with cirrhosis questionable gallbladder polyp otherwise no significant ascites     9/22/2022  Patient history is consistent with alcohol induced liver disease with decompensated alcoholic cirrhosis with ascites.  He stopped drinking alcohol since July 2022. We will also get a vitamin B12 level.  Advised to take multivitamins p.o. daily along with vitamin B12 1000 mcg prescription given.  Anemia appears to be secondary to cirrhosis.  With a significant alcohol abuse intermittent upper GI bleed suspected. His recent CT abdomen pelvis reviewed and findings are more suggestive of either viral colitis or colonic wall thickening secondary to ascites and cirrhosis.        7.  Adenomatous colon polyps  Colonoscopy done on 5/2/2023 revealed colon polyps.  The polyps were tubular adenoma without any dysplasia less than 1 cm in size and yesterday were hyperplastic.  His bowel prep was suboptimal.  Recommend repeat colonoscopy in 3 years time in 2026.  Colonoscopy 3 years ago, had a 5 polyps removed           Follow Up:   No follow-ups on file.       Follow Up:   No follow-ups on file.    Lucius Barragan MD  [facility]  6/12/2023  15:48 EDT     Please note that portions of this note may have been completed with a voice recognition program.

## 2023-06-12 ENCOUNTER — OFFICE VISIT (OUTPATIENT)
Dept: GASTROENTEROLOGY | Facility: CLINIC | Age: 63
End: 2023-06-12
Payer: MEDICARE

## 2023-06-12 VITALS
SYSTOLIC BLOOD PRESSURE: 94 MMHG | DIASTOLIC BLOOD PRESSURE: 79 MMHG | HEIGHT: 69 IN | OXYGEN SATURATION: 96 % | HEART RATE: 90 BPM | TEMPERATURE: 97.5 F | BODY MASS INDEX: 22.51 KG/M2 | WEIGHT: 152 LBS

## 2023-06-12 DIAGNOSIS — Z86.2 HISTORY OF ANEMIA: ICD-10-CM

## 2023-06-12 DIAGNOSIS — K31.89 PORTAL HYPERTENSIVE GASTROPATHY: ICD-10-CM

## 2023-06-12 DIAGNOSIS — D12.6 ADENOMATOUS POLYP OF COLON, UNSPECIFIED PART OF COLON: ICD-10-CM

## 2023-06-12 DIAGNOSIS — K70.31 ALCOHOLIC CIRRHOSIS OF LIVER WITH ASCITES: Primary | ICD-10-CM

## 2023-06-12 DIAGNOSIS — K76.6 PORTAL HYPERTENSIVE GASTROPATHY: ICD-10-CM

## 2023-06-12 DIAGNOSIS — K82.4 GALLBLADDER POLYP: ICD-10-CM

## 2023-06-12 PROCEDURE — 99214 OFFICE O/P EST MOD 30 MIN: CPT | Performed by: INTERNAL MEDICINE

## 2023-06-12 PROCEDURE — 1160F RVW MEDS BY RX/DR IN RCRD: CPT | Performed by: INTERNAL MEDICINE

## 2023-06-12 PROCEDURE — 1159F MED LIST DOCD IN RCRD: CPT | Performed by: INTERNAL MEDICINE

## 2023-06-12 RX ORDER — NADOLOL 20 MG/1
10 TABLET ORAL DAILY
Qty: 45 TABLET | Refills: 1 | Status: SHIPPED | OUTPATIENT
Start: 2023-06-12

## 2023-06-12 RX ORDER — PANTOPRAZOLE SODIUM 40 MG/1
1 TABLET, DELAYED RELEASE ORAL DAILY
COMMUNITY
Start: 2023-04-29

## 2023-09-01 ENCOUNTER — HOSPITAL ENCOUNTER (OUTPATIENT)
Dept: ULTRASOUND IMAGING | Facility: HOSPITAL | Age: 63
Discharge: HOME | End: 2023-09-01
Admitting: INTERNAL MEDICINE
Payer: MEDICARE

## 2023-09-01 DIAGNOSIS — K70.31 ALCOHOLIC CIRRHOSIS OF LIVER WITH ASCITES: ICD-10-CM

## 2023-09-01 PROCEDURE — 76700 US EXAM ABDOM COMPLETE: CPT

## 2024-08-14 ENCOUNTER — OFFICE VISIT (OUTPATIENT)
Dept: GASTROENTEROLOGY | Facility: CLINIC | Age: 64
End: 2024-08-14
Payer: MEDICARE

## 2024-08-14 ENCOUNTER — LAB (OUTPATIENT)
Dept: LAB | Facility: HOSPITAL | Age: 64
End: 2024-08-14
Payer: MEDICARE

## 2024-08-14 VITALS
DIASTOLIC BLOOD PRESSURE: 68 MMHG | WEIGHT: 142.8 LBS | SYSTOLIC BLOOD PRESSURE: 100 MMHG | HEART RATE: 75 BPM | OXYGEN SATURATION: 98 % | BODY MASS INDEX: 21.09 KG/M2

## 2024-08-14 DIAGNOSIS — Z87.19 HISTORY OF BARRETT'S ESOPHAGUS: ICD-10-CM

## 2024-08-14 DIAGNOSIS — Z87.19 HISTORY OF ESOPHAGEAL VARICES: ICD-10-CM

## 2024-08-14 DIAGNOSIS — D50.9 MICROCYTIC ANEMIA: ICD-10-CM

## 2024-08-14 DIAGNOSIS — K70.31 ALCOHOLIC CIRRHOSIS OF LIVER WITH ASCITES: Primary | ICD-10-CM

## 2024-08-14 DIAGNOSIS — K70.31 ALCOHOLIC CIRRHOSIS OF LIVER WITH ASCITES: ICD-10-CM

## 2024-08-14 DIAGNOSIS — R63.0 POOR APPETITE: ICD-10-CM

## 2024-08-14 LAB
ALBUMIN SERPL-MCNC: 4.6 G/DL (ref 3.5–5.2)
ALBUMIN/GLOB SERPL: 1.8 G/DL
ALP SERPL-CCNC: 96 U/L (ref 39–117)
ALT SERPL W P-5'-P-CCNC: 19 U/L (ref 1–41)
ANION GAP SERPL CALCULATED.3IONS-SCNC: 14 MMOL/L (ref 5–15)
AST SERPL-CCNC: 31 U/L (ref 1–40)
BILIRUB SERPL-MCNC: 0.3 MG/DL (ref 0–1.2)
BUN SERPL-MCNC: 5 MG/DL (ref 8–23)
BUN/CREAT SERPL: 6 (ref 7–25)
CALCIUM SPEC-SCNC: 9.5 MG/DL (ref 8.6–10.5)
CHLORIDE SERPL-SCNC: 104 MMOL/L (ref 98–107)
CO2 SERPL-SCNC: 20 MMOL/L (ref 22–29)
CREAT SERPL-MCNC: 0.84 MG/DL (ref 0.76–1.27)
DEPRECATED RDW RBC AUTO: 42.3 FL (ref 37–54)
EGFRCR SERPLBLD CKD-EPI 2021: 97.4 ML/MIN/1.73
ERYTHROCYTE [DISTWIDTH] IN BLOOD BY AUTOMATED COUNT: 17.6 % (ref 12.3–15.4)
FERRITIN SERPL-MCNC: 11.5 NG/ML (ref 30–400)
GLOBULIN UR ELPH-MCNC: 2.5 GM/DL
GLUCOSE SERPL-MCNC: 105 MG/DL (ref 65–99)
HCT VFR BLD AUTO: 30.1 % (ref 37.5–51)
HGB BLD-MCNC: 9 G/DL (ref 13–17.7)
INR PPP: 1.16 (ref 0.9–1.1)
IRON 24H UR-MRATE: 18 MCG/DL (ref 59–158)
IRON SATN MFR SERPL: 3 % (ref 20–50)
MCH RBC QN AUTO: 20.5 PG (ref 26.6–33)
MCHC RBC AUTO-ENTMCNC: 29.9 G/DL (ref 31.5–35.7)
MCV RBC AUTO: 68.7 FL (ref 79–97)
PLATELET # BLD AUTO: 265 10*3/MM3 (ref 140–450)
PMV BLD AUTO: 11.5 FL (ref 6–12)
POTASSIUM SERPL-SCNC: 3.4 MMOL/L (ref 3.5–5.2)
PROT SERPL-MCNC: 7.1 G/DL (ref 6–8.5)
PROTHROMBIN TIME: 15.3 SECONDS (ref 12.3–15.1)
RBC # BLD AUTO: 4.38 10*6/MM3 (ref 4.14–5.8)
SODIUM SERPL-SCNC: 138 MMOL/L (ref 136–145)
TIBC SERPL-MCNC: 571 MCG/DL (ref 298–536)
TRANSFERRIN SERPL-MCNC: 383 MG/DL (ref 200–360)
WBC NRBC COR # BLD AUTO: 5.97 10*3/MM3 (ref 3.4–10.8)

## 2024-08-14 PROCEDURE — 1159F MED LIST DOCD IN RCRD: CPT | Performed by: PHYSICIAN ASSISTANT

## 2024-08-14 PROCEDURE — 85027 COMPLETE CBC AUTOMATED: CPT

## 2024-08-14 PROCEDURE — 82105 ALPHA-FETOPROTEIN SERUM: CPT

## 2024-08-14 PROCEDURE — 82728 ASSAY OF FERRITIN: CPT

## 2024-08-14 PROCEDURE — 1160F RVW MEDS BY RX/DR IN RCRD: CPT | Performed by: PHYSICIAN ASSISTANT

## 2024-08-14 PROCEDURE — 99214 OFFICE O/P EST MOD 30 MIN: CPT | Performed by: PHYSICIAN ASSISTANT

## 2024-08-14 PROCEDURE — 83540 ASSAY OF IRON: CPT

## 2024-08-14 PROCEDURE — 84466 ASSAY OF TRANSFERRIN: CPT

## 2024-08-14 PROCEDURE — 36415 COLL VENOUS BLD VENIPUNCTURE: CPT

## 2024-08-14 PROCEDURE — 82746 ASSAY OF FOLIC ACID SERUM: CPT

## 2024-08-14 PROCEDURE — 82607 VITAMIN B-12: CPT

## 2024-08-14 PROCEDURE — 85610 PROTHROMBIN TIME: CPT

## 2024-08-14 PROCEDURE — 80053 COMPREHEN METABOLIC PANEL: CPT

## 2024-08-14 RX ORDER — ATORVASTATIN CALCIUM 40 MG/1
40 TABLET, FILM COATED ORAL DAILY
COMMUNITY

## 2024-08-14 NOTE — PROGRESS NOTES
Follow Up Note     Date: 2024   Patient Name: Gregor Fish Jr.  MRN: [id number]  : 1960     Primary Care Provider: Ana Lilia West APRN     Chief Complaint   Patient presents with    Heartburn     More indigestion not burning     Abnormal Imaging    Abnormal Lab    Weight Loss    Belching     History of present illness:   2024  Gregor Fish Jr. is a 64 y.o. male who is here today for follow up regarding Cirrhosis. He complains of Heartburn (More indigestion not burning ), Abnormal Imaging, Abnormal Lab, Weight Loss, and Belching.    He was seen in our GI office in the past, following for cirrhosis. He had to change GI last year due to his insurance. Now Dr. Silva leaving so he is returning here for management. He has not drank any alcohol now for over 2 years. No illicit drug use. Reports that he has lost about 10 lbs in the past couple of months. He has decreased appetite, not eating much. No current abdominal pain but reports indigestion often. He has diarrhea, 2-3 stools per day. No rectal bleeding or black stools. Was told in the past that his blood count was low. He does have 1 skip day between BMs occasionally. Has belching at times. Rarely drinks soda. No heartburn or reflux. Taking PPI daily. Had EGD with Dr. LAUREANO since last visit. No current abdominal distension or swelling.     Interval History:  2022  Gregor Fish is a 62 y.o. male who is here today to establish care with Gastroenterology for evaluation for recently diagnosed with cirrhosis.  Patient states that he has been having issues with the stomach in the form of diffuse abdominal discomfort abdominal distention nausea vomiting intermittently for the past 3 to 4 months.  He had a intermittent cough and diarrhea.  He also developed significant numbness in both upper extremities and the lower extremities for the last few months.  He was recently admitted in the hospital for above symptoms and a CT scan abdomen pelvis  done revealed a signs of cirrhosis with ascites.  Also showed some colonic wall thickening on the right side suggesting possible colitis.  He has been discharged with the diuretics and since the diuretic was started his symptoms have improved significantly and his leg swelling and abdominal swelling is improved.     No prior history of any viral hepatitis.  He did not know that he had a liver disease.  He is a chronic alcoholic he used to drink at least fifth of hard liquor over 30 years.  He is also chronic smoker.  His dad had a stomach cancer.  No family history of any cirrhosis.  As per patient he had a colonoscopy about 3 to 5 years ago and had at least a 5 polyps removed.  No prior endoscopy.     He denies any dysphagia symptoms, no nausea vomiting or reflux symptoms.  Prior history of anemia.  He denies any jaundice.  He has been tired and fatigued these days.  He had a diarrhea few months ago but diarrhea has stopped now since he stopped alcohol in July 2022..    Subjective     Past Medical History:   Diagnosis Date    Acute kidney failure 1980    Alcohol abuse     Anemia     Anxiety     Back pain     Bipolar disorder     Chronic kidney disease     Cirrhosis     Colitis 7/16/2022    COPD (chronic obstructive pulmonary disease)     Depression     Hyperlipidemia     Hypertension     Hypo-osmolality and hyponatremia     Hypomagnesemia     Insomnia     Myocardial infarction     Orthostatic hypotension     Sleep apnea     patient denies    Sleep trouble     Snores     Tattoos     Withdrawal symptoms, alcohol      Past Surgical History:   Procedure Laterality Date    COLONOSCOPY      COLONOSCOPY N/A 5/2/2023    Procedure: COLONOSCOPY WITH POLYPECTOMY;  Surgeon: Lucius Barragan MD;  Location: [facility] ENDOSCOPY;  Service: Gastroenterology;  Laterality: N/A;    ENDOSCOPY      ENDOSCOPY W/ BANDING N/A 9/27/2022    Procedure: ESOPHAGOGASTRODUODENOSCOPY WITH BANDING;  Surgeon: Lucius Barragan MD;  Location:  [facility] ENDOSCOPY;  Service: Gastroenterology;  Laterality: N/A;    INGUINAL HERNIA REPAIR Bilateral     TEETH EXTRACTION       Family History   Problem Relation Age of Onset    Diabetes Mother     Hypertension Mother     Stomach cancer Father     Heart attack Father     No Known Problems Sister     No Known Problems Brother     Colon cancer Neg Hx     Cirrhosis Neg Hx     Liver cancer Neg Hx     Liver disease Neg Hx      Social History     Socioeconomic History    Marital status: Single   Tobacco Use    Smoking status: Every Day     Current packs/day: 0.50     Average packs/day: 0.5 packs/day for 54.6 years (27.3 ttl pk-yrs)     Types: Cigarettes     Start date: 1970     Passive exposure: Current    Smokeless tobacco: Never   Vaping Use    Vaping status: Never Used   Substance and Sexual Activity    Alcohol use: Not Currently     Comment: Hx heavy use:  quit x 07/2022    Drug use: Never    Sexual activity: Defer     Current Outpatient Medications:     atorvastatin (LIPITOR) 40 MG tablet, Take 1 tablet by mouth Daily., Disp: , Rfl:     Cholecalciferol 25 MCG (1000 UT) capsule, Take 2 capsules by mouth Daily., Disp: , Rfl:     furosemide (LASIX) 40 MG tablet, Take 1 tablet by mouth Daily., Disp: 30 tablet, Rfl: 5    gabapentin (NEURONTIN) 400 MG capsule, Take 1 capsule by mouth 3 (Three) Times a Day., Disp: , Rfl:     lisinopril (PRINIVIL,ZESTRIL) 10 MG tablet, Take 1 tablet by mouth Daily., Disp: , Rfl:     nadolol (CORGARD) 20 MG tablet, Take 0.5 tablets by mouth Daily., Disp: 45 tablet, Rfl: 1    pantoprazole (PROTONIX) 40 MG EC tablet, Take 1 tablet by mouth Daily., Disp: , Rfl:     potassium chloride 10 MEQ CR tablet, Take 1 tablet by mouth Daily., Disp: , Rfl:     QUEtiapine (SEROquel) 200 MG tablet, Take 1 tablet by mouth every night at bedtime. 250mg total QHS, Disp: , Rfl:     QUEtiapine (SEROquel) 50 MG tablet, Take 1 tablet by mouth Every Night. 250mg total QHS, Disp: , Rfl:     No Known Allergies    The  following portions of the patient's history were reviewed and updated as appropriate: allergies, current medications, past family history, past medical history, past social history, past surgical history and problem list.    Objective     Physical Exam  Constitutional:       General: He is not in acute distress.     Appearance: Normal appearance. He is well-developed. He is not diaphoretic.   HENT:      Head: Normocephalic and atraumatic.      Right Ear: External ear normal.      Left Ear: External ear normal.      Nose: Nose normal.   Eyes:      General: No scleral icterus.        Right eye: No discharge.         Left eye: No discharge.      Conjunctiva/sclera: Conjunctivae normal.   Neck:      Trachea: No tracheal deviation.   Pulmonary:      Effort: Pulmonary effort is normal. No respiratory distress.   Musculoskeletal:         General: Normal range of motion.      Cervical back: Normal range of motion.   Skin:     Coloration: Skin is not pale.      Findings: No erythema or rash.   Neurological:      Mental Status: He is alert and oriented to person, place, and time.      Coordination: Coordination normal.   Psychiatric:         Mood and Affect: Mood normal.         Behavior: Behavior normal.         Thought Content: Thought content normal.         Judgment: Judgment normal.       Vitals:    08/14/24 1253   BP: 100/68   Pulse: 75   SpO2: 98%   Weight: 64.8 kg (142 lb 12.8 oz)     Results Review:   I reviewed the patient's new clinical results.    EGD 9/27/2022  - Normal oropharynx.  - Z-line irregular, 34 cm from the incisors.  - Esophageal mucosal changes suspicious for short-segment Huynh's esophagus. Biopsied.  - 3 cm hiatal hernia.  - Portal hypertensive gastropathy.  - Erosive gastropathy with stigmata of recent bleeding. Biopsied.  - Normal duodenal bulb, first portion of the duodenum, second portion of the duodenum and third portion of the  duodenum. Biopsied.  - Single column of Grade I and small (< 5  mm) esophageal varices.  - Candidiasis esophagitis with no bleeding.  Path; cade's without dysplasia, Candida     Colonoscopy on 5/2/2023  - Preparation of the colon was fair. Left colon views were sumoptimal due to solid stool  - One 3 mm polyp in the cecum, removed with a cold snare. Resected and retrieved.  - One 4 mm polyp at the hepatic flexure, removed with a cold snare. Resected and retrieved.  - Few 2 to 3 mm polyps in the rectum and at the recto-sigmoid colon, three of these removed with a cold snare.  Resected and retrieved. Cliniclaly hyperplastic  - Diverticulosis in the sigmoid colon and in the descending colon.  - Non-bleeding external and internal hemorrhoids.  - The examined portion of the ileum was normal.  - Stool in the entire examined colon.  Pathology  CECUM POLYP:  Tubular adenoma; negative for high-grade dysplasia.  B. HEPATIC FLEXURE POLYP:  Tubular adenoma; negative for high-grade dysplasia.  C. RECTOSIGMOID COLON POLYPS, X 3:  Hyperplastic polyp.    EGD Dr. Miller 10/2023  1.  3 cm irregular none circumferential Cade's mucosa in the distal esophagus.  2.  1 to 1.5 cm hiatal hernia.  3.  No evidence of portal hypertension gastropathy or esophageal or gastric varices.  Pathology DIAGNOSIS:   A.    ANTRUM, BIOPSY:   Antral mucosa with reactive gastropathy.   No H. pylori organisms are identified on H&E-stained sections.   B.     ESOPHAGUS, BIOPSY, DISTAL:   Squamocolumnar junctional mucosa with no significant histopathology.   Negative for intestinal metaplasia and intraepithelial eosinophils.     Labs 4/29/2024 hemoglobin 9.6, hematocrit 31.1, MCV 74 low, platelets 257,000, creatinine 0.91, sodium 141, albumin 4.6, total bilirubin 0.3, alkaline phosphatase 93, AST 27, ALT 19.    US liver 3/2024  5 mm gallbladder polyp.   Nodular liver with coarsened echotexture.     Assessment / Plan      1. Decompensated alcoholic cirrhosis of liver with ascites, MELD 9 (8/2022)   2. Microcytic  anemia  3. Poor appetite  4.  Grade 1 Secondary esophageal varices without bleed  5.  History of Huynh's esophagus   Patient's history is consistent with alcohol induced liver disease. He stopped drinking alcohol since July 2022. Has history of anemia which appeared to be secondary to cirrhosis.Last ultrasound abdomen 3/2024 revealed signs of cirrhosis without any ascites or liver lesion, had small probable gallbladder polyp 5 mm in size. EGD done on 9/27/2022 revealed 3 columns of short segment Huynh's esophagus.  He had a grade 1 esophageal varices and mild portal hypertensive gastropathy. Had EGD 10/2023 with Dr. LAUREANO in [place], [place] and no varices. He is immune to appetite is a but not immune to hepatitis B.  Hep C antibody is negative.  Iron studies negative for hemochromatosis.  VAL, anti-smooth muscle antibody, antimitochondrial antibody negative.  Prior echocardiogram done reveals normal ejection fraction without any cardiomyopathy. Last labs in chart 4/2024 with Hgb at 9. Denies any obvious GI bleeding.      Given history of Huynh's esophagus, continue PPI  Continue Lasix for now and will reduce the dose to 20 mg p.o. daily next visit if no further events of any ascites  Imaging every 6 months for HCC surveillance, due 9/2024  Repeat EGD in 2 years, due 10/2025  He needs hepatitis B vaccine with booster  Add nutritional shake 1-2 times daily  Labs today, will re-calculate MELD    - CBC (No Diff); Future  - Comprehensive Metabolic Panel; Future  - Protime-INR; Future  - AFP Tumor Marker; Future  - Iron Profile; Future  - Ferritin; Future  - Folate; Future  - Vitamin B12; Future    - US Abdomen Complete; Future      Prior history  Adenomatous colon polyps  Colonoscopy done on 5/2/2023 revealed colon polyps.  The polyps were tubular adenoma without any dysplasia less than 1 cm in size and yesterday were hyperplastic.  His bowel prep was suboptimal.  Recommend repeat colonoscopy in 3 years time in  2026.  Colonoscopy 3 years ago, had a 5 polyps removed       Follow Up:   Return in about 6 months (around 2/14/2025) for recheck liver disease.    Fernanda Lopez PA-C  [facility]  8/14/2024  17:03 EDT    Dictated Utilizing Dragon Dictation: Part of this note may be an electronic transcription/translation of spoken language to printed text using the Dragon Dictation System.

## 2024-08-15 ENCOUNTER — TELEPHONE (OUTPATIENT)
Dept: GASTROENTEROLOGY | Facility: CLINIC | Age: 64
End: 2024-08-15
Payer: MEDICARE

## 2024-08-15 DIAGNOSIS — R63.0 POOR APPETITE: ICD-10-CM

## 2024-08-15 DIAGNOSIS — E87.6 HYPOKALEMIA: ICD-10-CM

## 2024-08-15 DIAGNOSIS — D50.9 IRON DEFICIENCY ANEMIA, UNSPECIFIED IRON DEFICIENCY ANEMIA TYPE: Primary | ICD-10-CM

## 2024-08-15 LAB
ALPHA-FETOPROTEIN: 2.2 NG/ML (ref 0–8.3)
FOLATE SERPL-MCNC: 12.9 NG/ML (ref 4.78–24.2)
VIT B12 BLD-MCNC: 913 PG/ML (ref 211–946)

## 2024-08-15 RX ORDER — FERROUS SULFATE 325(65) MG
325 TABLET ORAL 2 TIMES DAILY WITH MEALS
Qty: 60 TABLET | Refills: 2 | Status: SHIPPED | OUTPATIENT
Start: 2024-08-15

## 2024-08-15 NOTE — TELEPHONE ENCOUNTER
Patient has been notified. He is taking potassium but states that he takes one about every other day.

## 2024-08-15 NOTE — TELEPHONE ENCOUNTER
Please let pt know that potassium low on recent labs at 3.4. Is he taking the potassium? Also, recommend he cut back on lasix from 40 to 20 mg daily.     Has low iron, Hgb at 9. Should start iron supplement twice daily which I have ordered. Due to anemia, ordered CTAP, will cancel US abd previously ordered.    Have labs again in 4 weeks to re-check, those have been ordered

## 2024-09-20 ENCOUNTER — TELEPHONE (OUTPATIENT)
Dept: GASTROENTEROLOGY | Facility: CLINIC | Age: 64
End: 2024-09-20
Payer: MEDICARE

## 2024-09-20 ENCOUNTER — HOSPITAL ENCOUNTER (OUTPATIENT)
Dept: CT IMAGING | Facility: HOSPITAL | Age: 64
Discharge: HOME | End: 2024-09-20
Payer: MEDICARE

## 2024-09-20 DIAGNOSIS — R93.5 ABNORMAL CT SCAN, PELVIS: ICD-10-CM

## 2024-09-20 DIAGNOSIS — D50.9 IRON DEFICIENCY ANEMIA, UNSPECIFIED IRON DEFICIENCY ANEMIA TYPE: ICD-10-CM

## 2024-09-20 DIAGNOSIS — N40.0 PROSTATE ENLARGEMENT: Primary | ICD-10-CM

## 2024-09-20 DIAGNOSIS — R63.0 POOR APPETITE: ICD-10-CM

## 2024-09-20 DIAGNOSIS — R93.5 ABNORMAL CT OF THE ABDOMEN: ICD-10-CM

## 2024-09-20 DIAGNOSIS — I77.1 STENOSIS OF RIGHT ILIAC ARTERY: ICD-10-CM

## 2024-09-20 PROCEDURE — 25510000001 IOPAMIDOL 61 % SOLUTION: Performed by: PHYSICIAN ASSISTANT

## 2024-09-20 PROCEDURE — 74177 CT ABD & PELVIS W/CONTRAST: CPT

## 2024-09-20 RX ORDER — IOPAMIDOL 612 MG/ML
100 INJECTION, SOLUTION INTRAVASCULAR
Status: COMPLETED | OUTPATIENT
Start: 2024-09-20 | End: 2024-09-20

## 2024-09-20 RX ADMIN — IOPAMIDOL 100 ML: 612 INJECTION, SOLUTION INTRAVENOUS at 10:23

## 2024-10-07 ENCOUNTER — OFFICE VISIT (OUTPATIENT)
Dept: CARDIAC SURGERY | Facility: CLINIC | Age: 64
End: 2024-10-07
Payer: MEDICARE

## 2024-10-07 VITALS
HEIGHT: 69 IN | WEIGHT: 141 LBS | BODY MASS INDEX: 20.88 KG/M2 | HEART RATE: 45 BPM | SYSTOLIC BLOOD PRESSURE: 100 MMHG | OXYGEN SATURATION: 98 % | DIASTOLIC BLOOD PRESSURE: 62 MMHG | TEMPERATURE: 96.9 F

## 2024-10-07 DIAGNOSIS — I73.9 PAD (PERIPHERAL ARTERY DISEASE): Primary | ICD-10-CM

## 2024-10-07 DIAGNOSIS — I73.9 PVD (PERIPHERAL VASCULAR DISEASE): Primary | ICD-10-CM

## 2024-10-07 NOTE — PROGRESS NOTES
10/07/2024  Patient Information  Gregor Fish Jr.                                                                                          [id number]  [facility] [place] [address]   1960  'PCP/Referring Physician'  Ana Lilia West APRN  [phone]  Fernanda Lopez PA-C  [phone]  Chief Complaint   Patient presents with    Consult     NP per Fernanda CADENA for Right Common Iliac Artery Stenosis-Complains of Right Leg Pain       History of Present Illness:   The patient is a 64-year-old male who is referred to me to evaluate peripheral arterial vascular disease.  He has a long history of tobacco abuse and says that for nearly 1 year he has had pain in the right calf when he walks and this has progressed to the right thigh and buttock weakness and fatigue.  He states that if he walks quickly he cannot walk 100 feet without having to rest.  CT scan at an outside facility demonstrates critical right iliac artery stenosis.  However, the scan does not extend distal to the femoral arteries bilaterally.  The patient says he has no left leg pain but his right leg pain limits him so that he cannot walk.  He has no rest pain and no slow healing or nonhealing ulcers.      Patient Active Problem List   Diagnosis    Tobacco abuse    Alcoholic cirrhosis of liver with ascites    Lower extremity edema    Atypical chest pain    Functional murmur    Personal history of colonic polyps    PVD (peripheral vascular disease)     Past Medical History:   Diagnosis Date    Acute kidney failure 1980    Alcohol abuse     Anemia     Anxiety     Back pain     Bipolar disorder     Borderline diabetes     Chronic kidney disease     Cirrhosis     Colitis 07/16/2022    COPD (chronic obstructive pulmonary disease)     Depression     Hernia, inguinal     ?    Hyperlipidemia     Hypertension     Hypo-osmolality and hyponatremia     Hypomagnesemia     Insomnia     Kidney failure     Myocardial infarction     Orthostatic hypotension     Peripheral  vascular disease     Sleep apnea     patient denies    Sleep trouble     Snores     Tattoos     Withdrawal symptoms, alcohol      Past Surgical History:   Procedure Laterality Date    COLONOSCOPY      COLONOSCOPY N/A 5/2/2023    Procedure: COLONOSCOPY WITH POLYPECTOMY;  Surgeon: Lucius Barragan MD;  Location: [facility] ENDOSCOPY;  Service: Gastroenterology;  Laterality: N/A;    ENDOSCOPY      ENDOSCOPY W/ BANDING N/A 9/27/2022    Procedure: ESOPHAGOGASTRODUODENOSCOPY WITH BANDING;  Surgeon: Lucius Barragan MD;  Location: [facility] ENDOSCOPY;  Service: Gastroenterology;  Laterality: N/A;    INGUINAL HERNIA REPAIR Bilateral     TEETH EXTRACTION         Current Outpatient Medications:     atorvastatin (LIPITOR) 40 MG tablet, Take 1 tablet by mouth Daily., Disp: , Rfl:     Cholecalciferol 25 MCG (1000 UT) capsule, Take 2 capsules by mouth Daily., Disp: , Rfl:     ferrous sulfate 325 (65 FE) MG tablet, Take 1 tablet by mouth 2 (Two) Times a Day With Meals., Disp: 60 tablet, Rfl: 2    furosemide (LASIX) 40 MG tablet, Take 1 tablet by mouth Daily., Disp: 30 tablet, Rfl: 5    gabapentin (NEURONTIN) 400 MG capsule, Take 1 capsule by mouth 3 (Three) Times a Day., Disp: , Rfl:     lisinopril (PRINIVIL,ZESTRIL) 10 MG tablet, Take 1 tablet by mouth Daily., Disp: , Rfl:     nadolol (CORGARD) 20 MG tablet, Take 0.5 tablets by mouth Daily., Disp: 45 tablet, Rfl: 1    pantoprazole (PROTONIX) 40 MG EC tablet, Take 1 tablet by mouth Daily., Disp: , Rfl:     QUEtiapine (SEROquel) 200 MG tablet, Take 1 tablet by mouth every night at bedtime. 250mg total QHS, Disp: , Rfl:     QUEtiapine (SEROquel) 50 MG tablet, Take 1 tablet by mouth Every Night. 250mg total QHS, Disp: , Rfl:     potassium chloride 10 MEQ CR tablet, Take 1 tablet by mouth Daily. (Patient not taking: Reported on 10/7/2024), Disp: , Rfl:   No Known Allergies  Social History     Socioeconomic History    Marital status: Single    Number of children: 4   Tobacco Use     Smoking status: Every Day     Current packs/day: 0.50     Average packs/day: 0.5 packs/day for 54.8 years (27.4 ttl pk-yrs)     Types: Cigarettes     Start date: 1970     Passive exposure: Current    Smokeless tobacco: Never   Vaping Use    Vaping status: Never Used   Substance and Sexual Activity    Alcohol use: Not Currently     Comment: Hx heavy use:  quit x 07/2022    Drug use: Never    Sexual activity: Defer     Family History   Problem Relation Age of Onset    Diabetes Mother     Hypertension Mother     Stomach cancer Father     Heart attack Father     No Known Problems Sister     No Known Problems Brother     Colon cancer Neg Hx     Cirrhosis Neg Hx     Liver cancer Neg Hx     Liver disease Neg Hx      Review of Systems   Constitutional: Positive for malaise/fatigue and weight loss (over 10 lbs in 2 months). Negative for chills, fever and night sweats.   HENT:  Negative for hearing loss, odynophagia and sore throat.    Cardiovascular:  Positive for chest pain, claudication (right greater than left) and dyspnea on exertion. Negative for leg swelling, orthopnea and palpitations.   Respiratory:  Positive for cough, shortness of breath and wheezing. Negative for hemoptysis.    Endocrine: Negative for cold intolerance, heat intolerance, polydipsia, polyphagia and polyuria.   Hematologic/Lymphatic: Bruises/bleeds easily.   Skin:  Negative for itching and rash.   Musculoskeletal:  Positive for back pain and joint pain. Negative for joint swelling and myalgias.   Gastrointestinal:  Positive for abdominal pain and diarrhea. Negative for constipation, hematemesis, hematochezia, melena, nausea and vomiting.   Genitourinary:  Negative for dysuria, frequency and hematuria.   Neurological:  Negative for focal weakness, headaches, numbness and seizures.   Psychiatric/Behavioral:  Positive for depression. Negative for suicidal ideas. The patient is nervous/anxious.    All other systems reviewed and are  "negative.    Vitals:    10/07/24 0807   BP: 100/62   BP Location: Left arm   Patient Position: Sitting   Pulse: (!) 45   Temp: 96.9 °F (36.1 °C)   SpO2: 98%   Weight: 64 kg (141 lb)   Height: 175.3 cm (69\")      Physical Exam   CONSTITUTIONAL: Alert and conversant, Well dressed, Well nourished, No acute distress  EYES: Sclera clean, Anicteric, Pupils equal  ENT: No nasal deviation, Trachea midline  NECK: No neck masses, Supple  LUNGS: No wheezing, Cough, non-congested  HEART: No rubs, No murmurs  GASTROINTESTINAL: Soft, non-distended, No masses, Non tender  to palpation, normal bowel sounds  NEURO: No motor deficits, No sensory deficits, Cranial Nerves 2 through 12 grossly intact  PSYCHIATRIC: Oriented to person, place and time, No memory deficits, Mood appropriate  VASCULAR: No carotid bruits, left femoral pulses palpable and I cannot palpate a right femoral pulse.  The feet are viable I can detect a very weak Doppler signal in the right posterior tibial and a much stronger Doppler signal in the left posterior tibial.  There is hair loss on the feet and ankles bilaterally  MUSKULOSKELETAL: No extremity trauma or extremity asymmetry    The ROS, past medical history, surgical history, family history, social history, and vitals were reviewed by myself and corrected as needed.      Labs/Imaging:  I have reviewed the CT angiogram images demonstrating right iliac stenosis. Smoking cessation was discussed and this patient is continuing to smoke despite his iliac disease and I have indicated how this has contributed to his vascular blockages.  He will attempt to quit smoking prior to his procedure. The patient does not have an advance directive at this time.     Assessment/Plan:   The patient is a 64-year-old male who has severe peripheral vascular disease with a CT scan demonstrating right iliac artery stenosis.  The scan did not extend below the femoral arteries bilaterally.  I plan for a right femoral artery cannulation " with a formal aortogram with bilateral runoff to the feet to assess both lower extremities distally with probable iliac stent at that time.  Patient is aware of the risk of bleeding, infection, limb loss and the possibility that he may spend 1 night in the hospital and he is agreeable to proceed. He is always aware of the risk of nephrotoxicity and contrast reaction.    Patient Active Problem List   Diagnosis    Tobacco abuse    Alcoholic cirrhosis of liver with ascites    Lower extremity edema    Atypical chest pain    Functional murmur    Personal history of colonic polyps    PVD (peripheral vascular disease)     CC: TAMMI Samson PA-C Regina Fugate editing for Toñito Dee MD       I, Toñito Dee MD, have read and agree with the editing done by Jyotsna Matamoros, .

## 2024-10-09 ENCOUNTER — PREP FOR SURGERY (OUTPATIENT)
Dept: OTHER | Facility: HOSPITAL | Age: 64
End: 2024-10-09
Payer: MEDICARE

## 2024-10-09 DIAGNOSIS — I73.9 PVD (PERIPHERAL VASCULAR DISEASE): Primary | ICD-10-CM

## 2024-10-22 ENCOUNTER — PRE-ADMISSION TESTING (OUTPATIENT)
Dept: PREADMISSION TESTING | Facility: HOSPITAL | Age: 64
End: 2024-10-22
Payer: OTHER GOVERNMENT

## 2024-10-22 ENCOUNTER — ANESTHESIA EVENT (OUTPATIENT)
Dept: PERIOP | Facility: HOSPITAL | Age: 64
End: 2024-10-22
Payer: OTHER GOVERNMENT

## 2024-10-22 VITALS — BODY MASS INDEX: 21.37 KG/M2 | WEIGHT: 140.98 LBS | HEIGHT: 68 IN

## 2024-10-22 DIAGNOSIS — E87.6 HYPOKALEMIA: Primary | ICD-10-CM

## 2024-10-22 DIAGNOSIS — D50.9 IRON DEFICIENCY ANEMIA, UNSPECIFIED IRON DEFICIENCY ANEMIA TYPE: ICD-10-CM

## 2024-10-22 LAB
ALBUMIN SERPL-MCNC: 4.8 G/DL (ref 3.5–5.2)
ALBUMIN/GLOB SERPL: 1.8 G/DL
ALP SERPL-CCNC: 99 U/L (ref 39–117)
ALT SERPL W P-5'-P-CCNC: 21 U/L (ref 1–41)
ANION GAP SERPL CALCULATED.3IONS-SCNC: 12 MMOL/L (ref 5–15)
AST SERPL-CCNC: 29 U/L (ref 1–40)
BASOPHILS # BLD AUTO: 0.04 10*3/MM3 (ref 0–0.2)
BASOPHILS NFR BLD AUTO: 0.5 % (ref 0–1.5)
BILIRUB SERPL-MCNC: 0.3 MG/DL (ref 0–1.2)
BUN SERPL-MCNC: 17 MG/DL (ref 8–23)
BUN/CREAT SERPL: 15.9 (ref 7–25)
CALCIUM SPEC-SCNC: 9.7 MG/DL (ref 8.6–10.5)
CHLORIDE SERPL-SCNC: 100 MMOL/L (ref 98–107)
CO2 SERPL-SCNC: 25 MMOL/L (ref 22–29)
CREAT SERPL-MCNC: 1.07 MG/DL (ref 0.76–1.27)
EGFRCR SERPLBLD CKD-EPI 2021: 77.5 ML/MIN/1.73
ELLIPTOCYTES BLD QL SMEAR: NORMAL
EOSINOPHIL # BLD AUTO: 0.35 10*3/MM3 (ref 0–0.4)
EOSINOPHIL NFR BLD AUTO: 4.1 % (ref 0.3–6.2)
GLOBULIN UR ELPH-MCNC: 2.6 GM/DL
GLUCOSE SERPL-MCNC: 107 MG/DL (ref 65–99)
HCT VFR BLD AUTO: 45.6 % (ref 37.5–51)
HGB BLD-MCNC: 14.9 G/DL (ref 13–17.7)
HYPOCHROMIA BLD QL: NORMAL
IMM GRANULOCYTES # BLD AUTO: 0.02 10*3/MM3 (ref 0–0.05)
IMM GRANULOCYTES NFR BLD AUTO: 0.2 % (ref 0–0.5)
LARGE PLATELETS: NORMAL
LYMPHOCYTES # BLD AUTO: 2.67 10*3/MM3 (ref 0.7–3.1)
LYMPHOCYTES NFR BLD AUTO: 31 % (ref 19.6–45.3)
MAGNESIUM SERPL-MCNC: 2.5 MG/DL (ref 1.6–2.4)
MCH RBC QN AUTO: 27.8 PG (ref 26.6–33)
MCHC RBC AUTO-ENTMCNC: 32.7 G/DL (ref 31.5–35.7)
MCV RBC AUTO: 85.1 FL (ref 79–97)
MICROCYTES BLD QL: NORMAL
MONOCYTES # BLD AUTO: 0.61 10*3/MM3 (ref 0.1–0.9)
MONOCYTES NFR BLD AUTO: 7.1 % (ref 5–12)
NEUTROPHILS NFR BLD AUTO: 4.91 10*3/MM3 (ref 1.7–7)
NEUTROPHILS NFR BLD AUTO: 57.1 % (ref 42.7–76)
NRBC BLD AUTO-RTO: 0 /100 WBC (ref 0–0.2)
OVALOCYTES BLD QL SMEAR: NORMAL
PLATELET # BLD AUTO: 231 10*3/MM3 (ref 140–450)
PMV BLD AUTO: 11.4 FL (ref 6–12)
POTASSIUM SERPL-SCNC: 4.1 MMOL/L (ref 3.5–5.2)
PROT SERPL-MCNC: 7.4 G/DL (ref 6–8.5)
RBC # BLD AUTO: 5.36 10*6/MM3 (ref 4.14–5.8)
SODIUM SERPL-SCNC: 137 MMOL/L (ref 136–145)
WBC MORPH BLD: NORMAL
WBC NRBC COR # BLD AUTO: 8.6 10*3/MM3 (ref 3.4–10.8)

## 2024-10-22 PROCEDURE — 83735 ASSAY OF MAGNESIUM: CPT

## 2024-10-22 PROCEDURE — 36415 COLL VENOUS BLD VENIPUNCTURE: CPT

## 2024-10-22 PROCEDURE — 85007 BL SMEAR W/DIFF WBC COUNT: CPT

## 2024-10-22 PROCEDURE — 93005 ELECTROCARDIOGRAM TRACING: CPT

## 2024-10-22 PROCEDURE — 80053 COMPREHEN METABOLIC PANEL: CPT

## 2024-10-22 PROCEDURE — 85025 COMPLETE CBC W/AUTO DIFF WBC: CPT

## 2024-10-22 RX ORDER — QUETIAPINE FUMARATE 50 MG/1
50 TABLET, FILM COATED ORAL EVERY MORNING
COMMUNITY

## 2024-10-22 RX ORDER — FAMOTIDINE 10 MG/ML
20 INJECTION, SOLUTION INTRAVENOUS ONCE
Status: CANCELLED | OUTPATIENT
Start: 2024-10-22 | End: 2024-10-22

## 2024-10-22 NOTE — PAT
An arrival time for procedure was not provided during PAT visit. If patient had any questions or concerns about their arrival time, they were instructed to contact their surgeon/physician.  Additionally, if the patient referred to an arrival time that was acquired from their my chart account, patient was encouraged to verify that time with their surgeon/physician. Arrival times are NOT provided in Pre Admission Testing Department.    Patient to apply Chlorhexadine wipes  to surgical area (as instructed) the night before procedure and the AM of procedure. Wipes provided.

## 2024-10-23 ENCOUNTER — APPOINTMENT (OUTPATIENT)
Dept: GENERAL RADIOLOGY | Facility: HOSPITAL | Age: 64
End: 2024-10-23
Payer: OTHER GOVERNMENT

## 2024-10-23 ENCOUNTER — ANESTHESIA (OUTPATIENT)
Dept: PERIOP | Facility: HOSPITAL | Age: 64
End: 2024-10-23
Payer: OTHER GOVERNMENT

## 2024-10-23 ENCOUNTER — HOSPITAL ENCOUNTER (OUTPATIENT)
Facility: HOSPITAL | Age: 64
Discharge: HOME | End: 2024-10-24
Attending: THORACIC SURGERY (CARDIOTHORACIC VASCULAR SURGERY) | Admitting: THORACIC SURGERY (CARDIOTHORACIC VASCULAR SURGERY)
Payer: OTHER GOVERNMENT

## 2024-10-23 PROBLEM — I73.9 PAD (PERIPHERAL ARTERY DISEASE): Status: ACTIVE | Noted: 2024-10-23

## 2024-10-23 LAB
QT INTERVAL: 436 MS
QTC INTERVAL: 424 MS

## 2024-10-23 PROCEDURE — C1874 STENT, COATED/COV W/DEL SYS: HCPCS | Performed by: THORACIC SURGERY (CARDIOTHORACIC VASCULAR SURGERY)

## 2024-10-23 PROCEDURE — 37221 PR REVSC OPN/PRQ ILIAC ART W/STNT PLMT & ANGIOPLSTY: CPT | Performed by: THORACIC SURGERY (CARDIOTHORACIC VASCULAR SURGERY)

## 2024-10-23 PROCEDURE — 63710000001 LISINOPRIL 10 MG TABLET: Performed by: PHYSICIAN ASSISTANT

## 2024-10-23 PROCEDURE — A9270 NON-COVERED ITEM OR SERVICE: HCPCS | Performed by: THORACIC SURGERY (CARDIOTHORACIC VASCULAR SURGERY)

## 2024-10-23 PROCEDURE — C1769 GUIDE WIRE: HCPCS | Performed by: THORACIC SURGERY (CARDIOTHORACIC VASCULAR SURGERY)

## 2024-10-23 PROCEDURE — 25010000002 ONDANSETRON PER 1 MG: Performed by: NURSE ANESTHETIST, CERTIFIED REGISTERED

## 2024-10-23 PROCEDURE — 63710000001 POTASSIUM CHLORIDE 10 MEQ CAPSULE CONTROLLED-RELEASE: Performed by: PHYSICIAN ASSISTANT

## 2024-10-23 PROCEDURE — C1894 INTRO/SHEATH, NON-LASER: HCPCS | Performed by: THORACIC SURGERY (CARDIOTHORACIC VASCULAR SURGERY)

## 2024-10-23 PROCEDURE — S0260 H&P FOR SURGERY: HCPCS | Performed by: THORACIC SURGERY (CARDIOTHORACIC VASCULAR SURGERY)

## 2024-10-23 PROCEDURE — 25010000002 NICARDIPINE 2.5 MG/ML SOLUTION 10 ML VIAL: Performed by: PHYSICIAN ASSISTANT

## 2024-10-23 PROCEDURE — 25010000002 HEPARIN (PORCINE) PER 1000 UNITS: Performed by: THORACIC SURGERY (CARDIOTHORACIC VASCULAR SURGERY)

## 2024-10-23 PROCEDURE — A9270 NON-COVERED ITEM OR SERVICE: HCPCS

## 2024-10-23 PROCEDURE — C1887 CATHETER, GUIDING: HCPCS | Performed by: THORACIC SURGERY (CARDIOTHORACIC VASCULAR SURGERY)

## 2024-10-23 PROCEDURE — 25010000002 FENTANYL CITRATE (PF) 50 MCG/ML SOLUTION

## 2024-10-23 PROCEDURE — C1725 CATH, TRANSLUMIN NON-LASER: HCPCS | Performed by: THORACIC SURGERY (CARDIOTHORACIC VASCULAR SURGERY)

## 2024-10-23 PROCEDURE — A9270 NON-COVERED ITEM OR SERVICE: HCPCS | Performed by: PHYSICIAN ASSISTANT

## 2024-10-23 PROCEDURE — 25010000002 DEXAMETHASONE PER 1 MG: Performed by: NURSE ANESTHETIST, CERTIFIED REGISTERED

## 2024-10-23 PROCEDURE — 25810000003 LACTATED RINGERS PER 1000 ML: Performed by: STUDENT IN AN ORGANIZED HEALTH CARE EDUCATION/TRAINING PROGRAM

## 2024-10-23 PROCEDURE — 63710000001 FERROUS SULFATE 325 (65 FE) MG TABLET: Performed by: PHYSICIAN ASSISTANT

## 2024-10-23 PROCEDURE — 75625 CONTRAST EXAM ABDOMINL AORTA: CPT

## 2024-10-23 PROCEDURE — 75716 ARTERY X-RAYS ARMS/LEGS: CPT | Performed by: THORACIC SURGERY (CARDIOTHORACIC VASCULAR SURGERY)

## 2024-10-23 PROCEDURE — 63710000001 GABAPENTIN 400 MG CAPSULE: Performed by: THORACIC SURGERY (CARDIOTHORACIC VASCULAR SURGERY)

## 2024-10-23 PROCEDURE — 63710000001 ATORVASTATIN 40 MG TABLET

## 2024-10-23 PROCEDURE — 75625 CONTRAST EXAM ABDOMINL AORTA: CPT | Performed by: THORACIC SURGERY (CARDIOTHORACIC VASCULAR SURGERY)

## 2024-10-23 PROCEDURE — 25810000003 SODIUM CHLORIDE 0.9 % SOLUTION 250 ML FLEX CONT: Performed by: PHYSICIAN ASSISTANT

## 2024-10-23 PROCEDURE — 75716 ARTERY X-RAYS ARMS/LEGS: CPT

## 2024-10-23 PROCEDURE — 25010000002 PROPOFOL 10 MG/ML EMULSION: Performed by: NURSE ANESTHETIST, CERTIFIED REGISTERED

## 2024-10-23 PROCEDURE — 25010000002 LIDOCAINE PF 1% 1 % SOLUTION: Performed by: NURSE ANESTHETIST, CERTIFIED REGISTERED

## 2024-10-23 PROCEDURE — 37221 PR REVSC OPN/PRQ ILIAC ART W/STNT PLMT & ANGIOPLSTY: CPT | Performed by: PHYSICIAN ASSISTANT

## 2024-10-23 PROCEDURE — 25010000002 LIDOCAINE PF 1% 1 % SOLUTION: Performed by: STUDENT IN AN ORGANIZED HEALTH CARE EDUCATION/TRAINING PROGRAM

## 2024-10-23 DEVICE — IMPLANTABLE DEVICE: Type: IMPLANTABLE DEVICE | Site: ARTERY ILIAC | Status: FUNCTIONAL

## 2024-10-23 RX ORDER — GABAPENTIN 400 MG/1
400 CAPSULE ORAL 3 TIMES DAILY
Status: DISCONTINUED | OUTPATIENT
Start: 2024-10-23 | End: 2024-10-24 | Stop reason: HOSPADM

## 2024-10-23 RX ORDER — FUROSEMIDE 40 MG
40 TABLET ORAL DAILY
Status: DISCONTINUED | OUTPATIENT
Start: 2024-10-24 | End: 2024-10-24 | Stop reason: HOSPADM

## 2024-10-23 RX ORDER — FAMOTIDINE 20 MG/1
20 TABLET, FILM COATED ORAL ONCE
Status: DISCONTINUED | OUTPATIENT
Start: 2024-10-23 | End: 2024-10-23 | Stop reason: HOSPADM

## 2024-10-23 RX ORDER — FENTANYL CITRATE 50 UG/ML
50 INJECTION, SOLUTION INTRAMUSCULAR; INTRAVENOUS
Status: DISCONTINUED | OUTPATIENT
Start: 2024-10-23 | End: 2024-10-23 | Stop reason: HOSPADM

## 2024-10-23 RX ORDER — FENTANYL CITRATE 50 UG/ML
INJECTION, SOLUTION INTRAMUSCULAR; INTRAVENOUS
Status: COMPLETED
Start: 2024-10-23 | End: 2024-10-23

## 2024-10-23 RX ORDER — SODIUM CHLORIDE 450 MG/100ML
100 INJECTION, SOLUTION INTRAVENOUS CONTINUOUS
Status: ACTIVE | OUTPATIENT
Start: 2024-10-23 | End: 2024-10-23

## 2024-10-23 RX ORDER — PROPOFOL 10 MG/ML
VIAL (ML) INTRAVENOUS AS NEEDED
Status: DISCONTINUED | OUTPATIENT
Start: 2024-10-23 | End: 2024-10-23 | Stop reason: SURG

## 2024-10-23 RX ORDER — POTASSIUM CHLORIDE 750 MG/1
10 CAPSULE, EXTENDED RELEASE ORAL DAILY
Status: DISCONTINUED | OUTPATIENT
Start: 2024-10-23 | End: 2024-10-24 | Stop reason: HOSPADM

## 2024-10-23 RX ORDER — HYDROMORPHONE HYDROCHLORIDE 1 MG/ML
0.5 INJECTION, SOLUTION INTRAMUSCULAR; INTRAVENOUS; SUBCUTANEOUS
Status: DISCONTINUED | OUTPATIENT
Start: 2024-10-23 | End: 2024-10-23 | Stop reason: HOSPADM

## 2024-10-23 RX ORDER — LIDOCAINE HYDROCHLORIDE 10 MG/ML
0.5 INJECTION, SOLUTION EPIDURAL; INFILTRATION; INTRACAUDAL; PERINEURAL ONCE AS NEEDED
Status: COMPLETED | OUTPATIENT
Start: 2024-10-23 | End: 2024-10-23

## 2024-10-23 RX ORDER — SODIUM CHLORIDE, SODIUM LACTATE, POTASSIUM CHLORIDE, CALCIUM CHLORIDE 600; 310; 30; 20 MG/100ML; MG/100ML; MG/100ML; MG/100ML
9 INJECTION, SOLUTION INTRAVENOUS CONTINUOUS
Status: ACTIVE | OUTPATIENT
Start: 2024-10-24 | End: 2024-10-24

## 2024-10-23 RX ORDER — SODIUM CHLORIDE 0.9 % (FLUSH) 0.9 %
10 SYRINGE (ML) INJECTION EVERY 12 HOURS SCHEDULED
Status: DISCONTINUED | OUTPATIENT
Start: 2024-10-23 | End: 2024-10-23 | Stop reason: HOSPADM

## 2024-10-23 RX ORDER — ONDANSETRON 4 MG/1
4 TABLET, ORALLY DISINTEGRATING ORAL EVERY 6 HOURS PRN
Status: DISCONTINUED | OUTPATIENT
Start: 2024-10-23 | End: 2024-10-24 | Stop reason: HOSPADM

## 2024-10-23 RX ORDER — HYDROCODONE BITARTRATE AND ACETAMINOPHEN 7.5; 325 MG/1; MG/1
1 TABLET ORAL EVERY 4 HOURS PRN
Status: DISCONTINUED | OUTPATIENT
Start: 2024-10-23 | End: 2024-10-24 | Stop reason: HOSPADM

## 2024-10-23 RX ORDER — NITROGLYCERIN 0.4 MG/1
0.4 TABLET SUBLINGUAL
Status: DISCONTINUED | OUTPATIENT
Start: 2024-10-23 | End: 2024-10-24 | Stop reason: HOSPADM

## 2024-10-23 RX ORDER — FERROUS SULFATE 325(65) MG
325 TABLET ORAL 2 TIMES DAILY WITH MEALS
Status: DISCONTINUED | OUTPATIENT
Start: 2024-10-23 | End: 2024-10-24 | Stop reason: HOSPADM

## 2024-10-23 RX ORDER — MORPHINE SULFATE 2 MG/ML
2 INJECTION, SOLUTION INTRAMUSCULAR; INTRAVENOUS
Status: DISCONTINUED | OUTPATIENT
Start: 2024-10-23 | End: 2024-10-24 | Stop reason: HOSPADM

## 2024-10-23 RX ORDER — NADOLOL 20 MG/1
10 TABLET ORAL DAILY
Status: DISCONTINUED | OUTPATIENT
Start: 2024-10-24 | End: 2024-10-24 | Stop reason: HOSPADM

## 2024-10-23 RX ORDER — DROPERIDOL 2.5 MG/ML
0.62 INJECTION, SOLUTION INTRAMUSCULAR; INTRAVENOUS ONCE AS NEEDED
Status: DISCONTINUED | OUTPATIENT
Start: 2024-10-23 | End: 2024-10-23 | Stop reason: HOSPADM

## 2024-10-23 RX ORDER — SODIUM CHLORIDE 0.9 % (FLUSH) 0.9 %
10 SYRINGE (ML) INJECTION AS NEEDED
Status: DISCONTINUED | OUTPATIENT
Start: 2024-10-23 | End: 2024-10-23 | Stop reason: HOSPADM

## 2024-10-23 RX ORDER — ONDANSETRON 2 MG/ML
INJECTION INTRAMUSCULAR; INTRAVENOUS AS NEEDED
Status: DISCONTINUED | OUTPATIENT
Start: 2024-10-23 | End: 2024-10-23 | Stop reason: SURG

## 2024-10-23 RX ORDER — LISINOPRIL 10 MG/1
10 TABLET ORAL DAILY
Status: DISCONTINUED | OUTPATIENT
Start: 2024-10-23 | End: 2024-10-24 | Stop reason: HOSPADM

## 2024-10-23 RX ORDER — NICARDIPINE HYDROCHLORIDE 2.5 MG/ML
INJECTION INTRAVENOUS
Status: DISPENSED
Start: 2024-10-23 | End: 2024-10-23

## 2024-10-23 RX ORDER — LIDOCAINE HYDROCHLORIDE 10 MG/ML
INJECTION, SOLUTION EPIDURAL; INFILTRATION; INTRACAUDAL; PERINEURAL AS NEEDED
Status: DISCONTINUED | OUTPATIENT
Start: 2024-10-23 | End: 2024-10-23 | Stop reason: SURG

## 2024-10-23 RX ORDER — MIDAZOLAM HYDROCHLORIDE 1 MG/ML
1 INJECTION INTRAMUSCULAR; INTRAVENOUS
Status: DISCONTINUED | OUTPATIENT
Start: 2024-10-23 | End: 2024-10-23 | Stop reason: HOSPADM

## 2024-10-23 RX ORDER — DEXAMETHASONE SODIUM PHOSPHATE 4 MG/ML
INJECTION, SOLUTION INTRA-ARTICULAR; INTRALESIONAL; INTRAMUSCULAR; INTRAVENOUS; SOFT TISSUE AS NEEDED
Status: DISCONTINUED | OUTPATIENT
Start: 2024-10-23 | End: 2024-10-23 | Stop reason: SURG

## 2024-10-23 RX ORDER — ATORVASTATIN CALCIUM 40 MG/1
40 TABLET, FILM COATED ORAL NIGHTLY
Status: DISCONTINUED | OUTPATIENT
Start: 2024-10-23 | End: 2024-10-24 | Stop reason: HOSPADM

## 2024-10-23 RX ORDER — QUETIAPINE FUMARATE 100 MG/1
200 TABLET, FILM COATED ORAL NIGHTLY
Status: DISCONTINUED | OUTPATIENT
Start: 2024-10-23 | End: 2024-10-24 | Stop reason: HOSPADM

## 2024-10-23 RX ORDER — PANTOPRAZOLE SODIUM 40 MG/1
40 TABLET, DELAYED RELEASE ORAL DAILY
Status: DISCONTINUED | OUTPATIENT
Start: 2024-10-24 | End: 2024-10-24 | Stop reason: HOSPADM

## 2024-10-23 RX ORDER — ATORVASTATIN CALCIUM 40 MG/1
40 TABLET, FILM COATED ORAL DAILY
Status: DISCONTINUED | OUTPATIENT
Start: 2024-10-23 | End: 2024-10-23

## 2024-10-23 RX ORDER — ONDANSETRON 2 MG/ML
4 INJECTION INTRAMUSCULAR; INTRAVENOUS EVERY 6 HOURS PRN
Status: DISCONTINUED | OUTPATIENT
Start: 2024-10-23 | End: 2024-10-24 | Stop reason: HOSPADM

## 2024-10-23 RX ADMIN — POTASSIUM CHLORIDE 10 MEQ: 750 CAPSULE, EXTENDED RELEASE ORAL at 14:24

## 2024-10-23 RX ADMIN — LIDOCAINE HYDROCHLORIDE 0.5 ML: 10 INJECTION, SOLUTION EPIDURAL; INFILTRATION; INTRACAUDAL; PERINEURAL at 06:24

## 2024-10-23 RX ADMIN — PROPOFOL 150 MG: 10 INJECTION, EMULSION INTRAVENOUS at 07:04

## 2024-10-23 RX ADMIN — SODIUM CHLORIDE 100 ML/HR: 4.5 INJECTION, SOLUTION INTRAVENOUS at 13:29

## 2024-10-23 RX ADMIN — GABAPENTIN 400 MG: 400 CAPSULE ORAL at 20:57

## 2024-10-23 RX ADMIN — SODIUM CHLORIDE 5 MG/HR: 9 INJECTION, SOLUTION INTRAVENOUS at 08:00

## 2024-10-23 RX ADMIN — DEXAMETHASONE SODIUM PHOSPHATE 8 MG: 4 INJECTION INTRA-ARTICULAR; INTRALESIONAL; INTRAMUSCULAR; INTRAVENOUS; SOFT TISSUE at 07:04

## 2024-10-23 RX ADMIN — FERROUS SULFATE TAB 325 MG (65 MG ELEMENTAL FE) 325 MG: 325 (65 FE) TAB at 18:26

## 2024-10-23 RX ADMIN — FENTANYL CITRATE 50 MCG: 50 INJECTION, SOLUTION INTRAMUSCULAR; INTRAVENOUS at 07:58

## 2024-10-23 RX ADMIN — PROPOFOL 75 MCG/KG/MIN: 10 INJECTION, EMULSION INTRAVENOUS at 07:10

## 2024-10-23 RX ADMIN — LISINOPRIL 10 MG: 10 TABLET ORAL at 14:24

## 2024-10-23 RX ADMIN — ATORVASTATIN CALCIUM 40 MG: 40 TABLET, FILM COATED ORAL at 20:57

## 2024-10-23 RX ADMIN — ONDANSETRON 4 MG: 2 INJECTION INTRAMUSCULAR; INTRAVENOUS at 07:04

## 2024-10-23 RX ADMIN — GABAPENTIN 400 MG: 400 CAPSULE ORAL at 15:39

## 2024-10-23 RX ADMIN — LIDOCAINE HYDROCHLORIDE 50 MG: 10 INJECTION, SOLUTION EPIDURAL; INFILTRATION; INTRACAUDAL; PERINEURAL at 07:04

## 2024-10-23 RX ADMIN — SODIUM CHLORIDE, POTASSIUM CHLORIDE, SODIUM LACTATE AND CALCIUM CHLORIDE 9 ML/HR: 600; 310; 30; 20 INJECTION, SOLUTION INTRAVENOUS at 06:24

## 2024-10-23 NOTE — PLAN OF CARE
Goal Outcome Evaluation:  Plan of Care Reviewed With: patient, family        Progress: improving  Outcome Evaluation: Received patient from PACU. Right groin dressing clean,dry and intact. Vital signs wnl. Oxygen level greater than 90% on room air. No complaints of pain. Pulses dopplered at first then throughout shift pulses strong and palable. Bilateral legs warm. Bedrest til morning. Call bell within reach. Will continue to monitor.

## 2024-10-23 NOTE — ANESTHESIA POSTPROCEDURE EVALUATION
Patient: Gregor Fish Jr.    Procedure Summary       Date: 10/23/24 Room / Location:  [facility] 02 / [facility] HYBRID OR    Anesthesia Start: 0659 Anesthesia Stop: 0737    Procedure: AORTAGRAM WITH RUNOFFS/ RIGHT ILIAC STENT (Abdomen) Diagnosis:       PVD (peripheral vascular disease)      (PVD (peripheral vascular disease) [I73.9])    Surgeons: Toñito Dee MD Provider: Ludwin Peace MD    Anesthesia Type: general ASA Status: 3            Anesthesia Type: general    Vitals  No vitals data found for the desired time range.  /75  SPO2 98%  RR 18  T 97F  HR 68 sr        Post Anesthesia Care and Evaluation    Patient location during evaluation: PACU  Patient participation: complete - patient participated  Level of consciousness: awake and alert  Pain management: adequate    Airway patency: patent  Anesthetic complications: No anesthetic complications  PONV Status: none  Cardiovascular status: hemodynamically stable and acceptable  Respiratory status: nonlabored ventilation, acceptable and nasal cannula  Hydration status: acceptable

## 2024-10-23 NOTE — ANESTHESIA PREPROCEDURE EVALUATION
Anesthesia Evaluation     Patient summary reviewed and Nursing notes reviewed   no history of anesthetic complications:   NPO Solid Status: > 8 hours  NPO Liquid Status: > 2 hours           Airway   Mallampati: II  TM distance: >3 FB  Neck ROM: full  No difficulty expected  Dental    (+) poor dentition        Pulmonary - normal exam    breath sounds clear to auscultation  (+) a smoker Current, COPD (no home inhalers or O2 used) mild,sleep apnea (patient denies)  Cardiovascular - normal exam  Exercise tolerance: good (4-7 METS)    ECG reviewed  Patient on routine beta blocker  Rhythm: regular  Rate: normal    (+) hypertension, past MI (No stents required)  >12 months, PVD, hyperlipidemia    ROS comment: EKG 10/22/24: HR 57  Sinus bradycardia  T wave abnormality, consider lateral ischemia      ECHO 10/2022: LV hyperdynamic EF 70%, Mild MR, Mild TR, RVSP < 35 mmHg    Neuro/Psych  (+) psychiatric history Anxiety, Depression and Bipolar  GI/Hepatic/Renal/Endo    (+) GERD, liver disease (Alcoholic cirrhosis; hx of bleeding varices 09/2022 requiring banding) cirrhosis, renal disease- CRI    Musculoskeletal     (+) back pain  Abdominal    Substance History   (+) alcohol use (Hx of EtOH abuse; abstinent since 2022), drug use (Smoke marijuana almost daily; last use 10/21/24)     OB/GYN          Other                          Anesthesia Plan    ASA 3     general   total IV anesthesia  (Propofol infusion)  intravenous induction     Anesthetic plan, risks, benefits, and alternatives have been provided, discussed and informed consent has been obtained with: patient.    Plan discussed with CRNA.        CODE STATUS:

## 2024-10-23 NOTE — H&P
Office Visit  10/7/2024  [facility] CARDIOTHORACIC SURGERY       Toñito Dee MD  Cardiothoracic Surgery PAD (peripheral artery disease)  Dx Consult ; Referred by Fernanda Lopez PA-C  Reason for Visit     Progress Notes  Toñito Dee MD (Physician)  Cardiothoracic Surgery  Expand All Collapse All[]Expand All by Default  10/07/2024  Patient Information  Gregor Fish Jr.                                                                                          [address]  [facility] [address]   1960  'PCP/Referring Physician'  Ana Lilia West APRN  [phone]  Fernanda Lopez PA-C  [phone]       Chief Complaint   Patient presents with    Consult       NP per Fernanda CADENA for Right Common Iliac Artery Stenosis-Complains of Right Leg Pain         History of Present Illness:   The patient is a 64-year-old male who is referred to me to evaluate peripheral arterial vascular disease.  He has a long history of tobacco abuse and says that for nearly 1 year he has had pain in the right calf when he walks and this has progressed to the right thigh and buttock weakness and fatigue.  He states that if he walks quickly he cannot walk 100 feet without having to rest.  CT scan at an outside facility demonstrates critical right iliac artery stenosis.  However, the scan does not extend distal to the femoral arteries bilaterally.  The patient says he has no left leg pain but his right leg pain limits him so that he cannot walk.  He has no rest pain and no slow healing or nonhealing ulcers.        Problem List       Patient Active Problem List   Diagnosis    Tobacco abuse    Alcoholic cirrhosis of liver with ascites    Lower extremity edema    Atypical chest pain    Functional murmur    Personal history of colonic polyps    PVD (peripheral vascular disease)         Medical History        Past Medical History:   Diagnosis Date    Acute kidney failure 1980    Alcohol abuse      Anemia       Anxiety      Back pain      Bipolar disorder      Borderline diabetes      Chronic kidney disease      Cirrhosis      Colitis 07/16/2022    COPD (chronic obstructive pulmonary disease)      Depression      Hernia, inguinal       ?    Hyperlipidemia      Hypertension      Hypo-osmolality and hyponatremia      Hypomagnesemia      Insomnia      Kidney failure      Myocardial infarction      Orthostatic hypotension      Peripheral vascular disease      Sleep apnea       patient denies    Sleep trouble      Snores      Tattoos      Withdrawal symptoms, alcohol           Surgical History         Past Surgical History:   Procedure Laterality Date    COLONOSCOPY        COLONOSCOPY N/A 5/2/2023     Procedure: COLONOSCOPY WITH POLYPECTOMY;  Surgeon: Lucius Barragan MD;  Location: [facility] ENDOSCOPY;  Service: Gastroenterology;  Laterality: N/A;    ENDOSCOPY        ENDOSCOPY W/ BANDING N/A 9/27/2022     Procedure: ESOPHAGOGASTRODUODENOSCOPY WITH BANDING;  Surgeon: Lucius Barragan MD;  Location: [facility] ENDOSCOPY;  Service: Gastroenterology;  Laterality: N/A;    INGUINAL HERNIA REPAIR Bilateral      TEETH EXTRACTION               Current Medications      Current Outpatient Medications:     atorvastatin (LIPITOR) 40 MG tablet, Take 1 tablet by mouth Daily., Disp: , Rfl:     Cholecalciferol 25 MCG (1000 UT) capsule, Take 2 capsules by mouth Daily., Disp: , Rfl:     ferrous sulfate 325 (65 FE) MG tablet, Take 1 tablet by mouth 2 (Two) Times a Day With Meals., Disp: 60 tablet, Rfl: 2    furosemide (LASIX) 40 MG tablet, Take 1 tablet by mouth Daily., Disp: 30 tablet, Rfl: 5    gabapentin (NEURONTIN) 400 MG capsule, Take 1 capsule by mouth 3 (Three) Times a Day., Disp: , Rfl:     lisinopril (PRINIVIL,ZESTRIL) 10 MG tablet, Take 1 tablet by mouth Daily., Disp: , Rfl:     nadolol (CORGARD) 20 MG tablet, Take 0.5 tablets by mouth Daily., Disp: 45 tablet, Rfl: 1    pantoprazole (PROTONIX) 40 MG EC tablet, Take 1 tablet by  mouth Daily., Disp: , Rfl:     QUEtiapine (SEROquel) 200 MG tablet, Take 1 tablet by mouth every night at bedtime. 250mg total QHS, Disp: , Rfl:     QUEtiapine (SEROquel) 50 MG tablet, Take 1 tablet by mouth Every Night. 250mg total QHS, Disp: , Rfl:     potassium chloride 10 MEQ CR tablet, Take 1 tablet by mouth Daily. (Patient not taking: Reported on 10/7/2024), Disp: , Rfl:      Allergies   No Known Allergies     Social History   Social History            Socioeconomic History    Marital status: Single    Number of children: 4   Tobacco Use    Smoking status: Every Day       Current packs/day: 0.50       Average packs/day: 0.5 packs/day for 54.8 years (27.4 ttl pk-yrs)       Types: Cigarettes       Start date: 1970       Passive exposure: Current    Smokeless tobacco: Never   Vaping Use    Vaping status: Never Used   Substance and Sexual Activity    Alcohol use: Not Currently       Comment: Hx heavy use:  quit x 07/2022    Drug use: Never    Sexual activity: Defer               Family History   Problem Relation Age of Onset    Diabetes Mother      Hypertension Mother      Stomach cancer Father      Heart attack Father      No Known Problems Sister      No Known Problems Brother      Colon cancer Neg Hx      Cirrhosis Neg Hx      Liver cancer Neg Hx      Liver disease Neg Hx        Review of Systems   Constitutional: Positive for malaise/fatigue and weight loss (over 10 lbs in 2 months). Negative for chills, fever and night sweats.   HENT:  Negative for hearing loss, odynophagia and sore throat.    Cardiovascular:  Positive for chest pain, claudication (right greater than left) and dyspnea on exertion. Negative for leg swelling, orthopnea and palpitations.   Respiratory:  Positive for cough, shortness of breath and wheezing. Negative for hemoptysis.    Endocrine: Negative for cold intolerance, heat intolerance, polydipsia, polyphagia and polyuria.   Hematologic/Lymphatic: Bruises/bleeds easily.   Skin:  Negative  "for itching and rash.   Musculoskeletal:  Positive for back pain and joint pain. Negative for joint swelling and myalgias.   Gastrointestinal:  Positive for abdominal pain and diarrhea. Negative for constipation, hematemesis, hematochezia, melena, nausea and vomiting.   Genitourinary:  Negative for dysuria, frequency and hematuria.   Neurological:  Negative for focal weakness, headaches, numbness and seizures.   Psychiatric/Behavioral:  Positive for depression. Negative for suicidal ideas. The patient is nervous/anxious.    All other systems reviewed and are negative.     Vitals       Vitals:     10/07/24 0807   BP: 100/62   BP Location: Left arm   Patient Position: Sitting   Pulse: (!) 45   Temp: 96.9 °F (36.1 °C)   SpO2: 98%   Weight: 64 kg (141 lb)   Height: 175.3 cm (69\")         Physical Exam   CONSTITUTIONAL: Alert and conversant, Well dressed, Well nourished, No acute distress  EYES: Sclera clean, Anicteric, Pupils equal  ENT: No nasal deviation, Trachea midline  NECK: No neck masses, Supple  LUNGS: No wheezing, Cough, non-congested  HEART: No rubs, No murmurs  GASTROINTESTINAL: Soft, non-distended, No masses, Non tender  to palpation, normal bowel sounds  NEURO: No motor deficits, No sensory deficits, Cranial Nerves 2 through 12 grossly intact  PSYCHIATRIC: Oriented to person, place and time, No memory deficits, Mood appropriate  VASCULAR: No carotid bruits, left femoral pulses palpable and I cannot palpate a right femoral pulse.  The feet are viable I can detect a very weak Doppler signal in the right posterior tibial and a much stronger Doppler signal in the left posterior tibial.  There is hair loss on the feet and ankles bilaterally  MUSKULOSKELETAL: No extremity trauma or extremity asymmetry     The ROS, past medical history, surgical history, family history, social history, and vitals were reviewed by myself and corrected as needed.       Labs/Imaging:  I have reviewed the CT angiogram images " "demonstrating right iliac stenosis. Smoking cessation was discussed and this patient is continuing to smoke despite his iliac disease and I have indicated how this has contributed to his vascular blockages.  He will attempt to quit smoking prior to his procedure. The patient does not have an advance directive at this time.      Assessment/Plan:   The patient is a 64-year-old male who has severe peripheral vascular disease with a CT scan demonstrating right iliac artery stenosis.  The scan did not extend below the femoral arteries bilaterally.  I plan for a right femoral artery cannulation with a formal aortogram with bilateral runoff to the feet to assess both lower extremities distally with probable iliac stent at that time.  Patient is aware of the risk of bleeding, infection, limb loss and the possibility that he may spend 1 night in the hospital and he is agreeable to proceed. He is always aware of the risk of nephrotoxicity and contrast reaction.     Problem List       Patient Active Problem List   Diagnosis    Tobacco abuse    Alcoholic cirrhosis of liver with ascites    Lower extremity edema    Atypical chest pain    Functional murmur    Personal history of colonic polyps    PVD (peripheral vascular disease)         CC:TAMMI Samson PA-C Regina Fugate editing for Toñito Dee MD         I, Toñito Dee MD, have read and agree with the editing done by Jyotsna Matamoros, .           Instructions      Return for AGRAM W/RT ILIAC STENT.  Patient declined After Visit Summary  Additional Documentation    Vitals: /62 (BP Location: Left arm, Patient Position: Sitting)     Pulse 45 Important      Temp 96.9 °F (36.1 °C)     Ht 175.3 cm (69\")      Wt 64 kg (141 lb)     SpO2 98%     BMI 20.82 kg/m²     BSA 1.78 m²   Flowsheets: Outbreak Screen   Encounter Info: Billing Info,     History,     Allergies,     Detailed Report,     ...(8 more) "     Communications    View All Conversations on this Encounter    Y[facility] Preferred Visit Summary sent to Fernanda Lopez PA-C  Sent 10/8/2024       Y[facility] Preferred Visit Summary sent to TAMMI Samson  Sent 10/8/2024  Media  From this encounter  Scan on 10/8/2024 1625 by Cata Moeller: ROM ENC 10/7/24     Patient declined AVS at 10/7/2024  8:39 AM    Encounter Information    Encounter Information   Provider Department Encounter #   10/7/2024 8:30 AM Toñito Dee MD [facility] [id number]     Primary Visit Coverage    Payer Plan Sponsor Code Group Number Group Name   HUMANA MEDICARE REPLACEMENT HU[employer]NA MEDICARE REPLACEMENT  [id number]      Primary Visit Coverage Subscriber    Subscriber ID Subscriber Name Subscriber SSN Subscriber Address   [id number] [place] [address]     Secondary Visit Coverage    Payer Plan Sponsor Code Group Number Group Name   [facility] [employer] OPTUM        Secondary Visit Coverage Subscriber    Subscriber ID Subscriber Name Subscriber SSN Subscriber Address   [id number] [address]   [address]       [place] [address]     Orders Placed    None  Medication Changes      None  Medication List  Visit Diagnoses      PAD (peripheral artery disease)  Problem List   Current Medications     Disp Start End   atorvastatin (LIPITOR) 40 MG tablet --  --   Sig - Route: Take 1 tablet by mouth Daily. - Oral   Class: Historical Med   Cholecalciferol 25 MCG (1000 UT) capsule --  --   Sig - Route: Take 2 capsules by mouth Daily. - Oral   Class: Historical Med   ferrous sulfate 325 (65 FE) MG tablet 60 tablet 8/15/2024 --   Sig - Route: Take 1 tablet by mouth 2 (Two) Times a Day With Meals. - Oral   furosemide (LASIX) 40 MG tablet 30 tablet 10/12/2022 --   Sig - Route: Take 1 tablet by mouth Daily. - Oral   gabapentin (NEURONTIN) 400 MG capsule --  --   Sig - Route: Take 1 capsule by mouth 3 (Three) Times a Day.  "- Oral   Class: Historical Med   lisinopril (PRINIVIL,ZESTRIL) 10 MG tablet -- 8/5/2022 --   Sig - Route: Take 1 tablet by mouth Daily. - Oral   Class: Historical Med   nadolol (CORGARD) 20 MG tablet 45 tablet 6/12/2023 --   Sig - Route: Take 0.5 tablets by mouth Daily. - Oral   pantoprazole (PROTONIX) 40 MG EC tablet -- 4/29/2023 --   Sig - Route: Take 1 tablet by mouth Daily. - Oral   Class: Historical Med   potassium chloride 10 MEQ CR tablet -- 10/12/2022 --   Sig - Route: Take 1 tablet by mouth Daily. - Oral   Class: No Print   QUEtiapine (SEROquel) 200 MG tablet -- 7/27/2022 --   Sig - Route: Take 1 tablet by mouth every night at bedtime. - Oral   Class: Historical Med   QUEtiapine (SEROquel) 50 MG tablet --  --   Sig - Route: Take 1 tablet by mouth Every Morning. - Oral   Class: Historical Med   Hospital Medications     Dose Frequency Start End   famotidine (PEPCID) tablet 20 mg 20 mg Once 10/23/2024 --   Route: Oral   lactated ringers infusion 9 mL/hr Continuous 10/24/2024 10/24/2024   Admin Instructions: May switch to NS IV at [facility] if renal / if indicated   Route: Intravenous   lidocaine PF 1% (XYLOCAINE) injection 0.5 mL (Completed) 0.5 mL Once As Needed 10/23/2024 10/23/2024   Route: Injection   midazolam (VERSED) injection 1 mg 1 mg Every 10 Minutes PRN 10/23/2024 --   Admin Instructions: May repeat dose in 10 minutes one time then contact provider for additional orders.     Route: Intravenous   sodium chloride 0.9 % flush 10 mL 10 mL Every 12 Hours Scheduled 10/23/2024 --   Route: Intravenous   sodium chloride 0.9 % flush 10 mL 10 mL As Needed 10/23/2024 --   Route: Intravenous   [facility] Pre-op    Full history and physical note from office is up to date.     /85 (BP Location: Right arm, Patient Position: Sitting)   Pulse 55   Temp 97.1 °F (36.2 °C) (Temporal)   Resp 18   Ht 172.7 cm (68\")   Wt 63.5 kg (140 lb)   SpO2 94%   BMI 21.29 kg/m²   Patient denies allergy to contrast " dye or latex  IMM:  Influenza: Yes  Pneumococcal: Yes  Tetanus: Up-to-date  Lungs: Few scattered expiratory wheezes.  Increased AP diameter noted.  Cardiovascular S1-S2 without rubs murmurs or gallops  Abdomen: Soft, nontender, bowel sounds present throughout.    LAB Results:  Lab Results   Component Value Date    WBC 8.60 10/22/2024    HGB 14.9 10/22/2024    HCT 45.6 10/22/2024    MCV 85.1 10/22/2024     10/22/2024    NEUTROABS 4.91 10/22/2024    GLUCOSE 107 (H) 10/22/2024    BUN 17 10/22/2024    CREATININE 1.07 10/22/2024    EGFRIFNONA 98 02/04/2018     10/22/2024    K 4.1 10/22/2024     10/22/2024    CO2 25.0 10/22/2024    MG 2.5 (H) 10/22/2024    CALCIUM 9.7 10/22/2024    ALBUMIN 4.8 10/22/2024    AST 29 10/22/2024    ALT 21 10/22/2024    BILITOT 0.3 10/22/2024    INR 1.16 (H) 08/14/2024       Cancer Staging (if applicable)  Cancer Patient: __ yes __no __unknown__N/A; If yes, clinical stage T:__ N:__M:__, stage group or __N/A  Impression: Severe peripheral vascular disease  Right lower extremity claudication  Ongoing cigarette use  COPD  History of alcoholic cirrhosis of the liver  Plan: Aortogram with runoffs/right iliac artery stenting       Toñito Charlton above.  Patient aware procedure risk of bleeding infection limb loss death nephrotoxicity contrast reaction and agrees to proceed  ARISTIDES Gooden   10/23/24   6:49 AM EDT

## 2024-10-23 NOTE — ANESTHESIA PROCEDURE NOTES
Airway  Urgency: elective    Date/Time: 10/23/2024 7:06 AM  Airway not difficult    General Information and Staff    Patient location during procedure: OR  CRNA/CAA: Keyur Murray CRNA    Indications and Patient Condition  Indications for airway management: airway protection    Preoxygenated: yes  Mask difficulty assessment: 1 - vent by mask    Final Airway Details  Final airway type: supraglottic airway      Successful airway: I-gel  Size 4     Number of attempts at approach: 1  Assessment: lips, teeth, and gum same as pre-op    Additional Comments  LMA placed without difficulty, ventilation with assist, equal breath sounds and symmetric chest rise and fall

## 2024-10-24 VITALS
RESPIRATION RATE: 17 BRPM | TEMPERATURE: 97.8 F | HEIGHT: 68 IN | BODY MASS INDEX: 21.22 KG/M2 | SYSTOLIC BLOOD PRESSURE: 105 MMHG | DIASTOLIC BLOOD PRESSURE: 59 MMHG | OXYGEN SATURATION: 95 % | HEART RATE: 61 BPM | WEIGHT: 140 LBS

## 2024-10-24 LAB
ANION GAP SERPL CALCULATED.3IONS-SCNC: 12 MMOL/L (ref 5–15)
BASOPHILS # BLD AUTO: 0.02 10*3/MM3 (ref 0–0.2)
BASOPHILS NFR BLD AUTO: 0.1 % (ref 0–1.5)
BUN SERPL-MCNC: 11 MG/DL (ref 8–23)
BUN/CREAT SERPL: 13.9 (ref 7–25)
CALCIUM SPEC-SCNC: 8.9 MG/DL (ref 8.6–10.5)
CHLORIDE SERPL-SCNC: 107 MMOL/L (ref 98–107)
CO2 SERPL-SCNC: 19 MMOL/L (ref 22–29)
CREAT SERPL-MCNC: 0.79 MG/DL (ref 0.76–1.27)
DEPRECATED RDW RBC AUTO: ABNORMAL FL
EGFRCR SERPLBLD CKD-EPI 2021: 99.2 ML/MIN/1.73
EOSINOPHIL # BLD AUTO: 0.05 10*3/MM3 (ref 0–0.4)
EOSINOPHIL NFR BLD AUTO: 0.4 % (ref 0.3–6.2)
ERYTHROCYTE [DISTWIDTH] IN BLOOD BY AUTOMATED COUNT: ABNORMAL %
GLUCOSE SERPL-MCNC: 122 MG/DL (ref 65–99)
HCT VFR BLD AUTO: 39.4 % (ref 37.5–51)
HGB BLD-MCNC: 13.2 G/DL (ref 13–17.7)
IMM GRANULOCYTES # BLD AUTO: 0.07 10*3/MM3 (ref 0–0.05)
IMM GRANULOCYTES NFR BLD AUTO: 0.5 % (ref 0–0.5)
LYMPHOCYTES # BLD AUTO: 2.72 10*3/MM3 (ref 0.7–3.1)
LYMPHOCYTES NFR BLD AUTO: 20.1 % (ref 19.6–45.3)
MCH RBC QN AUTO: 27.8 PG (ref 26.6–33)
MCHC RBC AUTO-ENTMCNC: 33.5 G/DL (ref 31.5–35.7)
MCV RBC AUTO: 82.9 FL (ref 79–97)
MONOCYTES # BLD AUTO: 1.2 10*3/MM3 (ref 0.1–0.9)
MONOCYTES NFR BLD AUTO: 8.9 % (ref 5–12)
NEUTROPHILS NFR BLD AUTO: 70 % (ref 42.7–76)
NEUTROPHILS NFR BLD AUTO: 9.48 10*3/MM3 (ref 1.7–7)
NRBC BLD AUTO-RTO: 0 /100 WBC (ref 0–0.2)
PLATELET # BLD AUTO: 202 10*3/MM3 (ref 140–450)
PMV BLD AUTO: 11.3 FL (ref 6–12)
POTASSIUM SERPL-SCNC: 3.9 MMOL/L (ref 3.5–5.2)
RBC # BLD AUTO: 4.75 10*6/MM3 (ref 4.14–5.8)
SODIUM SERPL-SCNC: 138 MMOL/L (ref 136–145)
WBC NRBC COR # BLD AUTO: 13.54 10*3/MM3 (ref 3.4–10.8)

## 2024-10-24 PROCEDURE — 63710000001 NADOLOL 20 MG TABLET: Performed by: PHYSICIAN ASSISTANT

## 2024-10-24 PROCEDURE — A9270 NON-COVERED ITEM OR SERVICE: HCPCS | Performed by: PHYSICIAN ASSISTANT

## 2024-10-24 PROCEDURE — 63710000001 GABAPENTIN 400 MG CAPSULE: Performed by: THORACIC SURGERY (CARDIOTHORACIC VASCULAR SURGERY)

## 2024-10-24 PROCEDURE — 63710000001 PANTOPRAZOLE 40 MG TABLET DELAYED-RELEASE: Performed by: PHYSICIAN ASSISTANT

## 2024-10-24 PROCEDURE — 63710000001 FERROUS SULFATE 325 (65 FE) MG TABLET: Performed by: PHYSICIAN ASSISTANT

## 2024-10-24 PROCEDURE — 80048 BASIC METABOLIC PNL TOTAL CA: CPT | Performed by: PHYSICIAN ASSISTANT

## 2024-10-24 PROCEDURE — 63710000001 LISINOPRIL 10 MG TABLET: Performed by: PHYSICIAN ASSISTANT

## 2024-10-24 PROCEDURE — 63710000001 FUROSEMIDE 40 MG TABLET: Performed by: PHYSICIAN ASSISTANT

## 2024-10-24 PROCEDURE — 85025 COMPLETE CBC W/AUTO DIFF WBC: CPT | Performed by: PHYSICIAN ASSISTANT

## 2024-10-24 PROCEDURE — 63710000001 POTASSIUM CHLORIDE 10 MEQ CAPSULE CONTROLLED-RELEASE: Performed by: PHYSICIAN ASSISTANT

## 2024-10-24 PROCEDURE — 99214 OFFICE O/P EST MOD 30 MIN: CPT | Performed by: THORACIC SURGERY (CARDIOTHORACIC VASCULAR SURGERY)

## 2024-10-24 PROCEDURE — A9270 NON-COVERED ITEM OR SERVICE: HCPCS | Performed by: THORACIC SURGERY (CARDIOTHORACIC VASCULAR SURGERY)

## 2024-10-24 RX ORDER — ASPIRIN 81 MG/1
81 TABLET ORAL DAILY
Qty: 90 TABLET | Refills: 3 | Status: SHIPPED | OUTPATIENT
Start: 2024-10-24 | End: 2025-10-24

## 2024-10-24 RX ADMIN — PANTOPRAZOLE SODIUM 40 MG: 40 TABLET, DELAYED RELEASE ORAL at 08:04

## 2024-10-24 RX ADMIN — POTASSIUM CHLORIDE 10 MEQ: 750 CAPSULE, EXTENDED RELEASE ORAL at 08:04

## 2024-10-24 RX ADMIN — FERROUS SULFATE TAB 325 MG (65 MG ELEMENTAL FE) 325 MG: 325 (65 FE) TAB at 08:03

## 2024-10-24 RX ADMIN — NADOLOL 10 MG: 20 TABLET ORAL at 08:03

## 2024-10-24 RX ADMIN — FUROSEMIDE 40 MG: 40 TABLET ORAL at 08:04

## 2024-10-24 RX ADMIN — LISINOPRIL 10 MG: 10 TABLET ORAL at 08:04

## 2024-10-24 RX ADMIN — GABAPENTIN 400 MG: 400 CAPSULE ORAL at 08:04

## 2024-10-24 NOTE — CASE MANAGEMENT/SOCIAL WORK
Case Management Discharge Note      Final Note: Patient has orders to discharge. Spoke with patient at bedside regarding discharge planning who denies use of HH or DME and indicates having prescription coverage [facility]. He lives with his Significant Other in a Mobile Home with six steps to enter. Patient is dressed and ready to leave and indicates he is independent at home. No discharge needs verbalized. Patient plan is to discharge home today via car with his brother to transport.         Selected Continued Care - Admitted Since 10/23/2024       Destination    No services have been selected for the patient.                Durable Medical Equipment    No services have been selected for the patient.                Dialysis/Infusion    No services have been selected for the patient.                Home Medical Care    No services have been selected for the patient.                Therapy    No services have been selected for the patient.                Community Resources    No services have been selected for the patient.                Community & DME    No services have been selected for the patient.                         Final Discharge Disposition Code: 01 - home or self-care

## 2024-10-24 NOTE — PROGRESS NOTES
CTS Progress Note       LOS: 0 days   Patient Care Team:  Ana Lilia West APRN as PCP - General  Lucius Barragan MD as Consulting Physician (Gastroenterology)  Fernanda Lopez PA-C as Physician Assistant (Physician Assistant)    Chief Complaint: PVD (peripheral vascular disease)    Vital Signs:  Temp:  [97 °F (36.1 °C)-97.8 °F (36.6 °C)] 97.8 °F (36.6 °C)  Heart Rate:  [49-73] 58  Resp:  [14-16] 16  BP: ()/(59-91) 100/66    Physical Exam: Right foot warm and viable posterior tibial pulse palpable groin sites are satisfactory       Results:     Results from last 7 days   Lab Units 10/22/24  1033   WBC 10*3/mm3 8.60   HEMOGLOBIN g/dL 14.9   HEMATOCRIT % 45.6   PLATELETS 10*3/mm3 231     Results from last 7 days   Lab Units 10/22/24  1033   SODIUM mmol/L 137   POTASSIUM mmol/L 4.1   CHLORIDE mmol/L 100   CO2 mmol/L 25.0   BUN mg/dL 17   CREATININE mg/dL 1.07   GLUCOSE mg/dL 107*   CALCIUM mg/dL 9.7           Imaging Results (Last 24 Hours)       Procedure Component Value Units Date/Time    XR [facility] OR Procedure [[id number]] Resulted: 10/23/24 0739     Updated: 10/23/24 0739            Assessment      PVD (peripheral vascular disease)    PAD (peripheral artery disease)        Plan   75 mg p.o. today  Plavix prescription for discharge for 90 days  Patient instructed to take aspirin 81 mg p.o. daily  Follow-up my office 2 to 3 weeks for wound check.  Will not need long-term follow-up from office    Please note that portions of this note were completed with a voice recognition program. Efforts were made to edit the dictations, but occasionally words are mistranscribed.    Toñito Dee MD  10/24/24  06:36 EDT

## 2024-10-24 NOTE — CASE MANAGEMENT/SOCIAL WORK
Discharge Planning Assessment  [facility]     Patient Name: Gregor Fish Jr.  MRN: [id number]  Today's Date: 10/24/2024    Admit Date: 10/23/2024    Plan: Home   Discharge Needs Assessment       Row Name 10/24/24 1135       Living Environment    People in Home significant other    Name(s) of People in Home Bhumi Alcocer    Current Living Arrangements home    Potentially Unsafe Housing Conditions none    Primary Care Provided by self    Provides Primary Care For no one    Family Caregiver if Needed significant other    Family Caregiver Names Bhumi Alcocer    Quality of Family Relationships unable to assess    Able to Return to Prior Arrangements yes       Resource/Environmental Concerns    Resource/Environmental Concerns none    Transportation Concerns none       Transition Planning    Patient/Family Anticipates Transition to home    Transportation Anticipated family or friend will provide       Discharge Needs Assessment    Readmission Within the Last 30 Days no previous admission in last 30 days    Equipment Currently Used at Home none    Concerns to be Addressed denies needs/concerns at this time;discharge planning    Anticipated Changes Related to Illness none    Equipment Needed After Discharge none    Discharge Coordination/Progress Home                   Discharge Plan       Row Name 10/24/24 9688       Plan    Plan Home    Patient/Family in Agreement with Plan yes    Plan Comments Patient has orders to discharge.  Spoke with patient at bedside regarding discharge planning who denies use of HH or DME and indicates having prescription coverage and [facility].  He lives with his Significant Other in a Mobile Home with six steps to enter.  Patient is dressed and ready to leave and indicates he is independent at home.  No discharge needs verbalized.   Patient plan is to discharge home today via car with his brother to transport.    Final Discharge Disposition Code 01 -  home or self-care                  Continued Care and Services - Admitted Since 10/23/2024    No active coordination exists for this encounter.       Expected Discharge Date and Time       Expected Discharge Date Expected Discharge Time    Oct 24, 2024            Demographic Summary       Row Name 10/24/24 1133       General Information    Admission Type inpatient    Arrived From home    Referral Source admission list    Reason for Consult discharge planning    Preferred Language English    General Information Comments TAMMI Samson       Contact Information    Permission Granted to Share Info With     Contact Information Comments Dede Vivas, sister  [phone]  Eloisa Hidalgo, sister  [phone]  Tho Fish, Brother  [phone]  Bhumi Alcocer Significant Other  [phone]                   Functional Status       Row Name 10/24/24 1134       Functional Status    Usual Activity Tolerance good    Current Activity Tolerance good       Functional Status, IADL    Medications independent    Meal Preparation independent    Housekeeping independent    Laundry independent    Shopping independent       Employment/    Employment/ Comments [employer]                   Psychosocial    No documentation.                  Abuse/Neglect    No documentation.                  Legal    No documentation.                  Substance Abuse    No documentation.                  Patient Forms    No documentation.                     Maral Copeland RN

## 2024-10-24 NOTE — DISCHARGE INSTRUCTIONS
Monitor surgical wounds daily. Keep incisons clean and dry at all times.  Call CT Surgery office [phone]  with any questions or concerns, specifically let them know of increased redness, drainage, or opening up of incision.

## 2024-10-24 NOTE — PLAN OF CARE
Goal Outcome Evaluation:  Plan of Care Reviewed With: patient, family        Progress: improving  Outcome Evaluation: Rested well through the night. Respirations easy and unlabored. Vital signs systolic less than 120. alert and orienteed required no needs.

## 2024-11-12 NOTE — PROGRESS NOTES
[facility] Cardiothoracic Surgery Office Follow Up Note     Date of Encounter: 2024     Name: Gregor Fish Jr.  : 1960     Referred By: No ref. provider found  PCP: Ana Lilia West APRN    Chief Complaint:    Chief Complaint   Patient presents with    Post-op Follow-up     Hosp D/C Right Iliac Stent 10/23/24       Subjective      History of Present Illness:    Gregor Fish Jr. is a 64 y.o. male with PMH of HLD, HTN, and PVD. He underwent angioplasty and stent of the right common iliac artery (7x39 VBX) with Dr. Dee on 10/23/24.   He tolerated the procedure well and was discharged the following day.   He returns to clinic today with greatly improved symptoms. He is able to walk longer distances without difficulty. He denies numbness, tingling, color changes of his legs and feet.     Review of Systems:  Review of Systems   Constitutional: Positive for malaise/fatigue and weight loss. Negative for chills, decreased appetite, diaphoresis, fever, night sweats and weight gain.   HENT:  Negative for hoarse voice.    Eyes:  Negative for blurred vision, double vision and visual disturbance.   Cardiovascular:  Negative for chest pain, claudication, dyspnea on exertion, irregular heartbeat, leg swelling, near-syncope, orthopnea, palpitations, paroxysmal nocturnal dyspnea and syncope.   Respiratory:  Positive for cough. Negative for hemoptysis, shortness of breath, sputum production and wheezing.    Hematologic/Lymphatic: Negative for adenopathy and bleeding problem. Bruises/bleeds easily.   Skin:  Negative for color change, nail changes, poor wound healing and rash.   Musculoskeletal:  Positive for back pain. Negative for falls and muscle cramps.   Gastrointestinal:  Positive for abdominal pain. Negative for dysphagia and heartburn.   Genitourinary:  Negative for flank pain.   Neurological:  Negative for brief paralysis, disturbances in coordination, dizziness, focal weakness, headaches,  light-headedness, loss of balance, numbness, paresthesias, sensory change, vertigo and weakness.   Psychiatric/Behavioral:  Negative for depression and suicidal ideas.    Allergic/Immunologic: Negative for persistent infections.       I have reviewed the following portions of the patient's history: problem list, current medications, allergies, past surgical history, past medical history, past social history, past family history, and ROS and confirm it's accurate.    Allergies:  No Known Allergies    Medications:      Current Outpatient Medications:     aspirin 81 MG EC tablet, Take 1 tablet by mouth Daily., Disp: 90 tablet, Rfl: 3    atorvastatin (LIPITOR) 40 MG tablet, Take 1 tablet by mouth Daily., Disp: , Rfl:     Cholecalciferol 25 MCG (1000 UT) capsule, Take 2 capsules by mouth Daily., Disp: , Rfl:     ferrous sulfate 325 (65 FE) MG tablet, Take 1 tablet by mouth 2 (Two) Times a Day With Meals., Disp: 60 tablet, Rfl: 2    furosemide (LASIX) 40 MG tablet, Take 1 tablet by mouth Daily., Disp: 30 tablet, Rfl: 5    gabapentin (NEURONTIN) 400 MG capsule, Take 1 capsule by mouth 3 (Three) Times a Day., Disp: , Rfl:     lisinopril (PRINIVIL,ZESTRIL) 10 MG tablet, Take 1 tablet by mouth Daily., Disp: , Rfl:     nadolol (CORGARD) 20 MG tablet, Take 0.5 tablets by mouth Daily., Disp: 45 tablet, Rfl: 1    pantoprazole (PROTONIX) 40 MG EC tablet, Take 1 tablet by mouth Daily., Disp: , Rfl:     potassium chloride 10 MEQ CR tablet, Take 1 tablet by mouth Daily., Disp: , Rfl:     QUEtiapine (SEROquel) 200 MG tablet, Take 1 tablet by mouth every night at bedtime., Disp: , Rfl:     QUEtiapine (SEROquel) 50 MG tablet, Take 1 tablet by mouth Every Morning., Disp: , Rfl:     History:   Past Medical History:   Diagnosis Date    Acute kidney failure 1980    Alcohol abuse     Anemia     Anxiety     Back pain     Bipolar disorder     Borderline diabetes     Chronic kidney disease     Cirrhosis     Colitis 07/16/2022    COPD (chronic  obstructive pulmonary disease)     Depression     Hernia, inguinal     ?    Hyperlipidemia     Hypertension     Hypo-osmolality and hyponatremia     Hypomagnesemia     Insomnia     Kidney failure     Leg pain     Myocardial infarction     Orthostatic hypotension     Peripheral vascular disease     Sleep apnea     patient denies    Sleep trouble     Snores     Tattoos     Withdrawal symptoms, alcohol        Past Surgical History:   Procedure Laterality Date    AORTAGRAM N/A 10/23/2024    Procedure: AORTAGRAM WITH RUNOFFS, RIGHT ILIAC STENT;  Surgeon: Toñito Dee MD;  Location: [facility] OR;  Service: Vascular;  Laterality: N/A;  ft 2 MIN 30 SEC. CONNTRAST- 60ML DOSE 126MgY    COLONOSCOPY      COLONOSCOPY N/A 5/2/2023    Procedure: COLONOSCOPY WITH POLYPECTOMY;  Surgeon: Lucius Barragan MD;  Location: [facility] ENDOSCOPY;  Service: Gastroenterology;  Laterality: N/A;    ENDOSCOPY      ENDOSCOPY W/ BANDING N/A 9/27/2022    Procedure: ESOPHAGOGASTRODUODENOSCOPY WITH BANDING;  Surgeon: Lucius Barragan MD;  Location: [facility] ENDOSCOPY;  Service: Gastroenterology;  Laterality: N/A;    INGUINAL HERNIA REPAIR Bilateral     TEETH EXTRACTION         Social History     Socioeconomic History    Marital status: Single    Number of children: 4   Tobacco Use    Smoking status: Every Day     Current packs/day: 1.00     Average packs/day: 1 pack/day for 54.9 years (54.9 ttl pk-yrs)     Types: Cigarettes     Start date: 1970     Passive exposure: Current    Smokeless tobacco: Never   Vaping Use    Vaping status: Never Used   Substance and Sexual Activity    Alcohol use: Not Currently     Comment: Hx heavy use:  quit x 07/2022    Drug use: Never    Sexual activity: Defer        Family History   Problem Relation Age of Onset    Diabetes Mother     Hypertension Mother     Stomach cancer Father     Heart attack Father     No Known Problems Sister     No Known Problems Brother     Colon cancer Neg Hx     Cirrhosis Neg  "Hx     Liver cancer Neg Hx     Liver disease Neg Hx        Objective     Physical Exam:  Vitals:    11/13/24 1156 11/13/24 1157   BP: 105/70 90/60   BP Location: Left arm Right arm   Patient Position: Sitting Sitting   Temp: 97.7 °F (36.5 °C)    SpO2: 99%    Weight: 64 kg (141 lb)    Height: 175.3 cm (69\")  Comment: patient reported       Body mass index is 20.82 kg/m².    Physical Exam  Vitals reviewed.   Constitutional:       Appearance: Normal appearance.   Eyes:      Pupils: Pupils are equal, round, and reactive to light.   Cardiovascular:      Rate and Rhythm: Normal rate and regular rhythm.      Pulses: Normal pulses.           Dorsalis pedis pulses are 1+ on the right side and 3+ on the left side.        Posterior tibial pulses are 2+ on the right side and 3+ on the left side.      Heart sounds: Normal heart sounds.   Skin:     Capillary Refill: Capillary refill takes less than 2 seconds.   Neurological:      Mental Status: He is alert and oriented to person, place, and time.   Psychiatric:         Mood and Affect: Mood normal.         Behavior: Behavior normal.         Imaging/Labs:  No new imaging to review       Assessment / Plan      Assessment / Plan:  There are no diagnoses linked to this encounter.   PAD s/p right iliac artery angioplasty and stent (7x39 VBX)  Patient doing well, leg pain has greatly improved  Pulses are palpable today, slightly weaker on the right. Foot is warm and well perfused with no ulcerations  Patient states he is only taking ASA, so I will call him in a prescription for Plavix to be taken for 90 days after surgery with the ASA. After 90 days, just take ASA    Follow Up:   Release to follow up as needed   Or sooner for any further concerns or worsening sign and symptoms. If unable to reach us in the office please dial 911 or go to the nearest emergency department.      Rhonda Kan PA-C   [facility] Cardiothoracic Surgery      Time Spent: I spent 30 minutes caring for " Gregor on this date of service. This time includes time spent by me in the following activities: preparing for the visit, reviewing tests, obtaining and/or reviewing a separately obtained history, performing a medically appropriate examination and/or evaluation, counseling and educating the patient/family/caregiver, ordering medications, tests, or procedures, referring and communicating with other health care professionals, documenting information in the medical record, independently interpreting results and communicating that information with the patient/family/caregiver, and care coordination.

## 2024-11-13 ENCOUNTER — OFFICE VISIT (OUTPATIENT)
Dept: CARDIAC SURGERY | Facility: CLINIC | Age: 64
End: 2024-11-13
Payer: MEDICARE

## 2024-11-13 VITALS
DIASTOLIC BLOOD PRESSURE: 60 MMHG | TEMPERATURE: 97.7 F | HEIGHT: 69 IN | OXYGEN SATURATION: 99 % | WEIGHT: 141 LBS | BODY MASS INDEX: 20.88 KG/M2 | SYSTOLIC BLOOD PRESSURE: 90 MMHG

## 2024-11-13 DIAGNOSIS — I73.9 PVD (PERIPHERAL VASCULAR DISEASE): Primary | ICD-10-CM

## 2024-11-13 PROCEDURE — 99024 POSTOP FOLLOW-UP VISIT: CPT | Performed by: PHYSICIAN ASSISTANT

## 2024-11-13 RX ORDER — CLOPIDOGREL BISULFATE 75 MG/1
75 TABLET ORAL DAILY
Qty: 30 TABLET | Refills: 2 | Status: SHIPPED | OUTPATIENT
Start: 2024-11-13 | End: 2025-02-11

## 2025-05-20 ENCOUNTER — TRANSCRIBE ORDERS (OUTPATIENT)
Dept: ADMINISTRATIVE | Facility: HOSPITAL | Age: 65
End: 2025-05-20
Payer: OTHER GOVERNMENT

## 2025-05-20 DIAGNOSIS — Z12.2 SCREENING FOR MALIGNANT NEOPLASM OF RESPIRATORY ORGAN: Primary | ICD-10-CM

## 2025-06-02 ENCOUNTER — OFFICE VISIT (OUTPATIENT)
Dept: GASTROENTEROLOGY | Facility: CLINIC | Age: 65
End: 2025-06-02
Payer: MEDICARE

## 2025-06-02 VITALS
HEART RATE: 53 BPM | WEIGHT: 137 LBS | BODY MASS INDEX: 20.29 KG/M2 | HEIGHT: 69 IN | SYSTOLIC BLOOD PRESSURE: 116 MMHG | TEMPERATURE: 97.1 F | DIASTOLIC BLOOD PRESSURE: 64 MMHG | OXYGEN SATURATION: 97 %

## 2025-06-02 DIAGNOSIS — Z87.19 HISTORY OF BARRETT'S ESOPHAGUS: ICD-10-CM

## 2025-06-02 DIAGNOSIS — Z86.2 HISTORY OF ANEMIA: ICD-10-CM

## 2025-06-02 DIAGNOSIS — Z87.19 HISTORY OF ESOPHAGEAL VARICES: ICD-10-CM

## 2025-06-02 DIAGNOSIS — K70.30 ALCOHOLIC CIRRHOSIS OF LIVER WITHOUT ASCITES: Primary | ICD-10-CM

## 2025-06-02 PROCEDURE — 1160F RVW MEDS BY RX/DR IN RCRD: CPT | Performed by: PHYSICIAN ASSISTANT

## 2025-06-02 PROCEDURE — 99214 OFFICE O/P EST MOD 30 MIN: CPT | Performed by: PHYSICIAN ASSISTANT

## 2025-06-02 PROCEDURE — 1159F MED LIST DOCD IN RCRD: CPT | Performed by: PHYSICIAN ASSISTANT

## 2025-06-02 RX ORDER — OMEPRAZOLE 20 MG/1
20 CAPSULE, DELAYED RELEASE ORAL DAILY
COMMUNITY
Start: 2025-02-03 | End: 2025-06-02 | Stop reason: SDUPTHER

## 2025-06-02 RX ORDER — NADOLOL 20 MG/1
10 TABLET ORAL DAILY
Qty: 45 TABLET | Refills: 1 | Status: SHIPPED | OUTPATIENT
Start: 2025-06-02

## 2025-06-02 RX ORDER — OMEPRAZOLE 40 MG/1
40 CAPSULE, DELAYED RELEASE ORAL DAILY
Qty: 90 CAPSULE | Refills: 1 | Status: SHIPPED | OUTPATIENT
Start: 2025-06-02

## 2025-06-02 RX ORDER — SODIUM CHLORIDE 9 MG/ML
30 INJECTION, SOLUTION INTRAVENOUS CONTINUOUS PRN
OUTPATIENT
Start: 2025-06-02 | End: 2025-06-03

## 2025-06-02 NOTE — PROGRESS NOTES
Follow Up Note     Date: 2025   Patient Name: Gregor Fish Jr.  MRN: [id number]  : 1960     Primary Care Provider: Ana Lilia West APRN     Chief Complaint   Patient presents with    Cirrhosis    Results     Labs, US abd     History of present illness:   2025  Gregor Fish Jr. is a 64 y.o. male who is here today for follow up regarding Cirrhosis and Results (Labs, US abd).    He had labs as directed and instead of US abd ordered, he had CTAP 2024. Would like to discuss those results. He started iron BID 2024 then stopped when he ran out (he thinks but not sure). Had hypokalemia and was recommended to cut down on dose of lasix from 40 to 20 mg once daily, he did not get that message. He thinks he is still taking the lasix 40 mg once daily and woud like to continue that dose. Feels the best on it per his report. No current peripheral edema or abdominal distension. Having daily stools.     He has taken both omeprazole and pantoprazole for GERD in the past. He likes omeprazole better and would like to stay on it.     Interval History:  2024  He was seen in our GI office in the past, following for cirrhosis. He had to change GI last year due to his insurance. Now Dr. Silva leaving so he is returning here for management. He has not drank any alcohol now for over 2 years. No illicit drug use. Reports that he has lost about 10 lbs in the past couple of months. He has decreased appetite, not eating much. No current abdominal pain but reports indigestion often. He has diarrhea, 2-3 stools per day. No rectal bleeding or black stools. Was told in the past that his blood count was low. He does have 1 skip day between BMs occasionally. Has belching at times. Rarely drinks soda. No heartburn or reflux. Taking PPI daily. Had EGD with Dr. LAUREANO since last visit. No current abdominal distension or swelling.     Subjective     Past Medical History:   Diagnosis Date    Acute kidney failure      Alcohol abuse     Anemia     Anxiety     Back pain     Bipolar disorder     Borderline diabetes     Chronic kidney disease     Cirrhosis     Colitis 07/16/2022    COPD (chronic obstructive pulmonary disease)     Depression     Hernia, inguinal     ?    Hyperlipidemia     Hypertension     Hypo-osmolality and hyponatremia     Hypomagnesemia     Insomnia     Kidney failure     Leg pain     Myocardial infarction     Orthostatic hypotension     Peripheral vascular disease     Sleep apnea     patient denies    Sleep trouble     Snores     Tattoos     Withdrawal symptoms, alcohol      Past Surgical History:   Procedure Laterality Date    AORTOGRAM N/A 10/23/2024    Procedure: AORTAGRAM WITH RUNOFFS, RIGHT ILIAC STENT;  Surgeon: Toñito Dee MD;  Location: [facility] HYBRID OR;  Service: Vascular;  Laterality: N/A;  ft 2 MIN 30 SEC. CONNTRAST- 60ML DOSE 126MgY    COLONOSCOPY      COLONOSCOPY N/A 5/2/2023    Procedure: COLONOSCOPY WITH POLYPECTOMY;  Surgeon: Lucius Barragan MD;  Location: [facility] ENDOSCOPY;  Service: Gastroenterology;  Laterality: N/A;    ENDOSCOPY      ENDOSCOPY W/ BANDING N/A 9/27/2022    Procedure: ESOPHAGOGASTRODUODENOSCOPY WITH BANDING;  Surgeon: Lucius aBrragan MD;  Location: [facility] ENDOSCOPY;  Service: Gastroenterology;  Laterality: N/A;    INGUINAL HERNIA REPAIR Bilateral     TEETH EXTRACTION       Family History   Problem Relation Age of Onset    Diabetes Mother     Hypertension Mother     Stomach cancer Father     Heart attack Father     No Known Problems Sister     No Known Problems Brother     Colon cancer Neg Hx     Cirrhosis Neg Hx     Liver cancer Neg Hx     Liver disease Neg Hx      Social History     Socioeconomic History    Marital status: Single    Number of children: 4   Tobacco Use    Smoking status: Every Day     Current packs/day: 1.00     Average packs/day: 1 pack/day for 55.4 years (55.4 ttl pk-yrs)     Types: Cigarettes     Start date: 1970     Passive exposure:  Current    Smokeless tobacco: Never   Vaping Use    Vaping status: Never Used   Substance and Sexual Activity    Alcohol use: Not Currently     Comment: Hx heavy use:  quit x 07/2022    Drug use: Never    Sexual activity: Defer     Current Outpatient Medications:     aspirin 81 MG EC tablet, Take 1 tablet by mouth Daily., Disp: 90 tablet, Rfl: 3    atorvastatin (LIPITOR) 40 MG tablet, Take 1 tablet by mouth Daily., Disp: , Rfl:     Cholecalciferol 25 MCG (1000 UT) capsule, Take 2 capsules by mouth Daily., Disp: , Rfl:     furosemide (LASIX) 40 MG tablet, Take 1 tablet by mouth Daily., Disp: 30 tablet, Rfl: 5    gabapentin (NEURONTIN) 400 MG capsule, Take 1 capsule by mouth 3 (Three) Times a Day., Disp: , Rfl:     lisinopril (PRINIVIL,ZESTRIL) 10 MG tablet, Take 1 tablet by mouth Daily., Disp: , Rfl:     nadolol (CORGARD) 20 MG tablet, Take 0.5 tablets by mouth Daily., Disp: 45 tablet, Rfl: 1    omeprazole (priLOSEC) 20 MG capsule, Take 1 capsule by mouth Daily. for the stomach, Disp: , Rfl:     pantoprazole (PROTONIX) 40 MG EC tablet, Take 1 tablet by mouth Daily., Disp: , Rfl:     QUEtiapine (SEROquel) 200 MG tablet, Take 1 tablet by mouth every night at bedtime., Disp: , Rfl:     QUEtiapine (SEROquel) 50 MG tablet, Take 1 tablet by mouth Every Morning., Disp: , Rfl:     ferrous sulfate 325 (65 FE) MG tablet, Take 1 tablet by mouth 2 (Two) Times a Day With Meals. (Patient not taking: Reported on 6/2/2025), Disp: 60 tablet, Rfl: 2    potassium chloride 10 MEQ CR tablet, Take 1 tablet by mouth Daily. (Patient not taking: Reported on 6/2/2025), Disp: , Rfl:     No Known Allergies    The following portions of the patient's history were reviewed and updated as appropriate: allergies, current medications, past family history, past medical history, past social history, past surgical history and problem list.    Objective     Physical Exam  Constitutional:       General: He is not in acute distress.     Appearance: Normal  "appearance. He is well-developed. He is not diaphoretic.   HENT:      Head: Normocephalic and atraumatic.      Right Ear: External ear normal.      Left Ear: External ear normal.      Nose: Nose normal.   Eyes:      General: No scleral icterus.        Right eye: No discharge.         Left eye: No discharge.      Conjunctiva/sclera: Conjunctivae normal.   Neck:      Trachea: No tracheal deviation.   Pulmonary:      Effort: Pulmonary effort is normal. No respiratory distress.   Musculoskeletal:         General: Normal range of motion.      Cervical back: Normal range of motion.   Skin:     Coloration: Skin is not pale.      Findings: No erythema or rash.   Neurological:      Mental Status: He is alert and oriented to person, place, and time.      Coordination: Coordination normal.   Psychiatric:         Mood and Affect: Mood normal.         Behavior: Behavior normal.         Thought Content: Thought content normal.         Judgment: Judgment normal.       Vitals:    06/02/25 1135   BP: 116/64   Pulse: 53   Temp: 97.1 °F (36.2 °C)   TempSrc: Infrared   SpO2: 97%   Weight: 62.1 kg (137 lb)   Height: 175.3 cm (69\")     Results Review:   I reviewed the patient's new clinical results.    Admission on 10/23/2024, Discharged on 10/24/2024   Component Date Value Ref Range Status    Glucose 10/24/2024 122 (H)  65 - 99 mg/dL Final    BUN 10/24/2024 11  8 - 23 mg/dL Final    Creatinine 10/24/2024 0.79  0.76 - 1.27 mg/dL Final    Sodium 10/24/2024 138  136 - 145 mmol/L Final    Potassium 10/24/2024 3.9  3.5 - 5.2 mmol/L Final    Chloride 10/24/2024 107  98 - 107 mmol/L Final    CO2 10/24/2024 19.0 (L)  22.0 - 29.0 mmol/L Final    Calcium 10/24/2024 8.9  8.6 - 10.5 mg/dL Final    BUN/Creatinine Ratio 10/24/2024 13.9  7.0 - 25.0 Final    Anion Gap 10/24/2024 12.0  5.0 - 15.0 mmol/L Final    eGFR 10/24/2024 99.2  >60.0 mL/min/1.73 Final    WBC 10/24/2024 13.54 (H)  3.40 - 10.80 10*3/mm3 Final    RBC 10/24/2024 4.75  4.14 - 5.80 " 10*6/mm3 Final    Hemoglobin 10/24/2024 13.2  13.0 - 17.7 g/dL Final    Hematocrit 10/24/2024 39.4  37.5 - 51.0 % Final    MCV 10/24/2024 82.9  79.0 - 97.0 fL Final    MCH 10/24/2024 27.8  26.6 - 33.0 pg Final    MCHC 10/24/2024 33.5  31.5 - 35.7 g/dL Final    RDW 10/24/2024    Final    Unable to calculate     RDW-SD 10/24/2024    Final    Unable to calculate     MPV 10/24/2024 11.3  6.0 - 12.0 fL Final    Platelets 10/24/2024 202  140 - 450 10*3/mm3 Final     EGD 9/27/2022  - Normal oropharynx.  - Z-line irregular, 34 cm from the incisors.  - Esophageal mucosal changes suspicious for short-segment Cade's esophagus. Biopsied.  - 3 cm hiatal hernia.  - Portal hypertensive gastropathy.  - Erosive gastropathy with stigmata of recent bleeding. Biopsied.  - Normal duodenal bulb, first portion of the duodenum, second portion of the duodenum and third portion of the  duodenum. Biopsied.  - Single column of Grade I and small (< 5 mm) esophageal varices.  - Candidiasis esophagitis with no bleeding.  Path; cade's without dysplasia, Candida     Colonoscopy on 5/2/2023  - Preparation of the colon was fair. Left colon views were sumoptimal due to solid stool  - One 3 mm polyp in the cecum, removed with a cold snare. Resected and retrieved.  - One 4 mm polyp at the hepatic flexure, removed with a cold snare. Resected and retrieved.  - Few 2 to 3 mm polyps in the rectum and at the recto-sigmoid colon, three of these removed with a cold snare.  Resected and retrieved. Cliniclaly hyperplastic  - Diverticulosis in the sigmoid colon and in the descending colon.  - Non-bleeding external and internal hemorrhoids.  - The examined portion of the ileum was normal.  - Stool in the entire examined colon.  Pathology  CECUM POLYP:  Tubular adenoma; negative for high-grade dysplasia.  B. HEPATIC FLEXURE POLYP:  Tubular adenoma; negative for high-grade dysplasia.  C. RECTOSIGMOID COLON POLYPS, X 3:  Hyperplastic polyp.     EGD   [facility] 10/2023  1.  3 cm irregular none circumferential Huynh's mucosa in the distal esophagus.  2.  1 to 1.5 cm hiatal hernia.  3.  No evidence of portal hypertension gastropathy or esophageal or gastric varices.  Pathology DIAGNOSIS:   A.    ANTRUM, BIOPSY:   Antral mucosa with reactive gastropathy.   No H. pylori organisms are identified on H&E-stained sections.   B.     ESOPHAGUS, BIOPSY, DISTAL:   Squamocolumnar junctional mucosa with no significant histopathology.   Negative for intestinal metaplasia and intraepithelial eosinophils.      US liver 3/2024  5 mm gallbladder polyp.   Nodular liver with coarsened echotexture.    Folate (08/14/2024 14:00)  Ferritin (08/14/2024 14:00)  Iron Profile (08/14/2024 14:00)  AFP Tumor Marker (08/14/2024 14:00)  Protime-INR (08/14/2024 14:00)  Comprehensive Metabolic Panel (08/14/2024 14:00)  CBC (No Diff) (08/14/2024 14:00)  Vitamin B12 (08/14/2024 14:00)    Assessment / Plan      1. Alcoholic cirrhosis of liver with ascites, MELD 9 (8/2022), MELD 8 (8/2024)  2. History of esophageal varices  3. History of anemia  4. History of Huynh's esophagus  5. Gallbladder polyp  Patient's history is consistent with alcohol induced liver disease. He stopped drinking alcohol July 2022, no current alcohol use. Has history of anemia which appeared to be secondary to cirrhosis, last labs 10/2024 with normal Hgb. He thinks he did take iron for a few months in the Fall 2024 as directed. Last imaging of the abdomen 9/2024 showed normal liver, no lesions. Prior US abd 2024 revealed signs of cirrhosis without any ascites or liver lesion, had small probable gallbladder polyp 5 mm in size. EGD done on 9/27/2022 revealed 3 columns of short segment Huynh's esophagus. He had a grade 1 esophageal varices and mild portal hypertensive gastropathy. Had EGD 10/2023 with Dr. LAUREANO in [place], [place] and no varices. He is immune to Hep A but is not immune to hepatitis B.  Hep C antibody is negative.  Iron  studies negative for hemochromatosis.  VAL, anti-smooth muscle antibody, antimitochondrial antibody negative.  Prior echocardiogram done reveals normal ejection fraction without any cardiomyopathy. Denies any obvious GI bleeding, no HE, no constipation, no abdominal distension. He did have ascites back in 2022 when he first learned of cirrhosis diagnosis, none since then.     Given history of Huynh's esophagus, continue PPI  Continue Lasix for now at his request, should consider lowering dose next visit  Continue nadolol 10 mg nightly  Stop protonix  Start omeprazole 40 mg once daily  Imaging every 6 months for HCC surveillance, past due 3/2025  Repeat EGD in 2 years, due 10/2025, will arrange  He needs hepatitis B vaccine with booster  Labs today, will re-calculate MELD  Low salt diet  Continue to avoid alcohol  Avoid NSAIDs  Ok to dc iron supplement for now     - CBC (No Diff); Future  - Comprehensive Metabolic Panel; Future  - Protime-INR; Future  - AFP Tumor Marker; Future  - Iron Profile; Future  - Ferritin; Future  - Folate; Future  - Vitamin B12; Future     - US Abdomen Complete; Future        Prior history  Adenomatous colon polyps  Colonoscopy done on 5/2/2023 revealed colon polyps.  The polyps were tubular adenoma without any dysplasia less than 1 cm in size and yesterday were hyperplastic.  His bowel prep was suboptimal.  Recommend repeat colonoscopy in 3 years time in 5/2026.    Follow Up:   Return in about 6 months (around 12/2/2025) for recheck liver disease.    Fernanda Lopez PA-C  [facility] [place]  6/2/2025  11:38 EDT

## 2025-06-23 ENCOUNTER — TELEPHONE (OUTPATIENT)
Dept: GASTROENTEROLOGY | Facility: CLINIC | Age: 65
End: 2025-06-23
Payer: MEDICARE

## 2025-06-23 NOTE — TELEPHONE ENCOUNTER
Let him know US abd done recently at [facility] showed known cirrhosis, no liver lesions

## 2025-06-30 ENCOUNTER — HOSPITAL ENCOUNTER (OUTPATIENT)
Dept: CT IMAGING | Facility: HOSPITAL | Age: 65
Discharge: HOME | End: 2025-06-30
Admitting: INTERNAL MEDICINE
Payer: OTHER GOVERNMENT

## 2025-06-30 DIAGNOSIS — Z12.2 SCREENING FOR MALIGNANT NEOPLASM OF RESPIRATORY ORGAN: ICD-10-CM

## 2025-06-30 PROCEDURE — 71271 CT THORAX LUNG CANCER SCR C-: CPT

## (undated) DEVICE — SUCTION CANISTER, 1500CC, RIGID: Brand: DEROYAL

## (undated) DEVICE — CATH GUIDE SOFTVU FLUSH HT PIG .035 4F 65CM

## (undated) DEVICE — PINNACLE INTRODUCER SHEATH: Brand: PINNACLE

## (undated) DEVICE — Device

## (undated) DEVICE — HYBRID TUBING/CAP SET FOR OLYMPUS® SCOPES: Brand: ERBE

## (undated) DEVICE — LUBE JELLY PK/2.75GM STRL BX/144

## (undated) DEVICE — CVR HNDL LIGHT RIGID

## (undated) DEVICE — CONMED SCOPE SAVER BITE BLOCK, 20X27 MM: Brand: SCOPE SAVER

## (undated) DEVICE — ENDOSCOPY PORT CONNECTOR FOR OLYMPUS® SCOPES: Brand: ERBE

## (undated) DEVICE — QUICK CATCH IN-LINE SUCTION POLYP TRAP IS USED FOR SUCTION RETRIEVAL OF ENDOSCOPICALLY REMOVED POLYPS.

## (undated) DEVICE — GLV SURG BIOGEL LTX PF 7 1/2

## (undated) DEVICE — VLV SXN AIR/H2O ORCAPOD3 1P/U STRL

## (undated) DEVICE — BALN EVERCROSS OTW .035 6F 9X60MM 80CM

## (undated) DEVICE — AVANTI + 4F STD W/GW: Brand: AVANTI

## (undated) DEVICE — LN INJ CONTRST FLXCIL HP BR F/M LL W/ADAPT/ROT 1200PSI 48IN

## (undated) DEVICE — RADIFOCUS GLIDEWIRE ADVANTAGE GUIDEWIRE: Brand: GLIDEWIRE ADVANTAGE

## (undated) DEVICE — FRCP BX RADJAW4 NDL 2.8 240 STD OG

## (undated) DEVICE — GLIDEX™ COATED HYDROPHILIC GUIDEWIRE: Brand: MAGIC TORQUE™

## (undated) DEVICE — PK ANGIO OR 10

## (undated) DEVICE — GLV SURG BIOGEL LTX PF 8

## (undated) DEVICE — SINGLE-USE POLYPECTOMY SNARE: Brand: CAPTIVATOR II

## (undated) DEVICE — INFLATION DEVICE: Brand: ENCORE 26